# Patient Record
Sex: FEMALE | Race: BLACK OR AFRICAN AMERICAN | NOT HISPANIC OR LATINO | Employment: FULL TIME | URBAN - METROPOLITAN AREA
[De-identification: names, ages, dates, MRNs, and addresses within clinical notes are randomized per-mention and may not be internally consistent; named-entity substitution may affect disease eponyms.]

---

## 2017-01-23 ENCOUNTER — APPOINTMENT (OUTPATIENT)
Dept: LAB | Facility: HOSPITAL | Age: 47
End: 2017-01-23
Payer: COMMERCIAL

## 2017-01-23 ENCOUNTER — TRANSCRIBE ORDERS (OUTPATIENT)
Dept: ADMINISTRATIVE | Facility: HOSPITAL | Age: 47
End: 2017-01-23

## 2017-01-23 ENCOUNTER — HOSPITAL ENCOUNTER (OUTPATIENT)
Dept: NON INVASIVE DIAGNOSTICS | Facility: HOSPITAL | Age: 47
Discharge: HOME | End: 2017-01-23
Payer: COMMERCIAL

## 2017-01-23 DIAGNOSIS — Z98.890 H/O BILATERAL BREAST REDUCTION SURGERY: ICD-10-CM

## 2017-01-23 DIAGNOSIS — Z98.890 H/O BILATERAL BREAST REDUCTION SURGERY: Primary | ICD-10-CM

## 2017-01-23 LAB
ANION GAP SERPL CALCULATED.3IONS-SCNC: 8 MMOL/L (ref 4–13)
ATRIAL RATE: 84 BPM
BUN SERPL-MCNC: 12 MG/DL (ref 5–25)
CALCIUM SERPL-MCNC: 9.6 MG/DL (ref 8.3–10.1)
CHLORIDE SERPL-SCNC: 104 MMOL/L (ref 100–108)
CO2 SERPL-SCNC: 26 MMOL/L (ref 21–32)
CREAT SERPL-MCNC: 0.9 MG/DL (ref 0.6–1.3)
ERYTHROCYTE [DISTWIDTH] IN BLOOD BY AUTOMATED COUNT: 17.3 % (ref 11.6–15.1)
GFR SERPL CREATININE-BSD FRML MDRD: >60 ML/MIN/1.73SQ M
GLUCOSE SERPL-MCNC: 80 MG/DL (ref 65–140)
HCT VFR BLD AUTO: 30.2 % (ref 34.8–46.1)
HGB BLD-MCNC: 9.7 G/DL (ref 11.5–15.4)
MCH RBC QN AUTO: 24.3 PG (ref 26.8–34.3)
MCHC RBC AUTO-ENTMCNC: 32.1 G/DL (ref 31.4–37.4)
MCV RBC AUTO: 76 FL (ref 82–98)
P AXIS: 67 DEGREES
PLATELET # BLD AUTO: 379 THOUSANDS/UL (ref 149–390)
PMV BLD AUTO: 10.4 FL (ref 8.9–12.7)
POTASSIUM SERPL-SCNC: 4.2 MMOL/L (ref 3.5–5.3)
PR INTERVAL: 142 MS
QRS AXIS: 15 DEGREES
QRSD INTERVAL: 82 MS
QT INTERVAL: 360 MS
QTC INTERVAL: 425 MS
RBC # BLD AUTO: 4 MILLION/UL (ref 3.81–5.12)
SODIUM SERPL-SCNC: 138 MMOL/L (ref 136–145)
T WAVE AXIS: 35 DEGREES
VENTRICULAR RATE: 84 BPM
WBC # BLD AUTO: 6.23 THOUSAND/UL (ref 4.31–10.16)

## 2017-01-23 PROCEDURE — 85027 COMPLETE CBC AUTOMATED: CPT

## 2017-01-23 PROCEDURE — 36415 COLL VENOUS BLD VENIPUNCTURE: CPT

## 2017-01-23 PROCEDURE — 80048 BASIC METABOLIC PNL TOTAL CA: CPT

## 2017-01-23 PROCEDURE — 93005 ELECTROCARDIOGRAM TRACING: CPT

## 2017-04-18 ENCOUNTER — ALLSCRIPTS OFFICE VISIT (OUTPATIENT)
Dept: OTHER | Facility: OTHER | Age: 47
End: 2017-04-18

## 2017-05-22 ENCOUNTER — APPOINTMENT (EMERGENCY)
Dept: RADIOLOGY | Facility: HOSPITAL | Age: 47
End: 2017-05-22
Payer: COMMERCIAL

## 2017-05-22 ENCOUNTER — HOSPITAL ENCOUNTER (EMERGENCY)
Facility: HOSPITAL | Age: 47
Discharge: HOME | End: 2017-05-22
Payer: COMMERCIAL

## 2017-05-22 VITALS
SYSTOLIC BLOOD PRESSURE: 167 MMHG | RESPIRATION RATE: 18 BRPM | WEIGHT: 185 LBS | TEMPERATURE: 98.8 F | OXYGEN SATURATION: 100 % | HEART RATE: 102 BPM | DIASTOLIC BLOOD PRESSURE: 89 MMHG

## 2017-05-22 DIAGNOSIS — J02.0 STREP PHARYNGITIS: Primary | ICD-10-CM

## 2017-05-22 LAB — S PYO AG THROAT QL: POSITIVE

## 2017-05-22 PROCEDURE — 99283 EMERGENCY DEPT VISIT LOW MDM: CPT

## 2017-05-22 PROCEDURE — 71020 HB CHEST X-RAY 2VW FRONTAL&LATL: CPT

## 2017-05-22 PROCEDURE — 87430 STREP A AG IA: CPT | Performed by: EMERGENCY MEDICINE

## 2017-05-22 RX ORDER — AMOXICILLIN 500 MG/1
500 TABLET, FILM COATED ORAL 2 TIMES DAILY
Qty: 20 TABLET | Refills: 0 | Status: SHIPPED | OUTPATIENT
Start: 2017-05-22 | End: 2017-06-01

## 2017-05-25 ENCOUNTER — ALLSCRIPTS OFFICE VISIT (OUTPATIENT)
Dept: OTHER | Facility: OTHER | Age: 47
End: 2017-05-25

## 2017-05-25 DIAGNOSIS — E66.9 OBESITY: ICD-10-CM

## 2017-05-25 DIAGNOSIS — I10 ESSENTIAL (PRIMARY) HYPERTENSION: ICD-10-CM

## 2018-01-11 NOTE — MISCELLANEOUS
Provider Comments  Provider Comments:   Patient is a no-show for her [phone] appointment today for hypertension      Signatures   Electronically signed by : STEVE Anderson;  Apr 18 2017  6:51PM EST                       (Author)

## 2018-01-14 NOTE — RESULT NOTES
Verified Results  (1) BASIC METABOLIC PROFILE [id number] 09:11AM Lakeisha Valverde Order Number: [id number]  TW Order Number: [id number]     Test Name Result Flag Reference   GLUCOSE,RANDM 77 mg/dL     If the patient is fasting, the ADA then defines impaired fasting glucose as > 100 mg/dL and diabetes as > or equal to 123 mg/dL  SODIUM 139 mmol/L  136-145   POTASSIUM 4 4 mmol/L  3 5-5 3   CHLORIDE 106 mmol/L  100-108   CARBON DIOXIDE 27 mmol/L  21-32   ANION GAP (CALC) 6 mmol/L  4-13   BLOOD UREA NITROGEN 15 mg/dL  5-25   CREATININE 0 86 mg/dL  0 60-1 30   Standardized to IDMS reference method   CALCIUM 9 5 mg/dL  8 3-10 1   eGFR Non-African American      >60 0 ml/min/1 73sq m   [facility] [employer] Disease Education Program recommendations are as follows:  GFR calculation is accurate only with a steady state creatinine  Chronic Kidney disease less than 60 ml/min/1 73 sq  meters  Kidney failure less than 15 ml/min/1 73 sq  meters

## 2018-01-15 VITALS
HEIGHT: 61 IN | DIASTOLIC BLOOD PRESSURE: 92 MMHG | TEMPERATURE: 98.1 F | HEART RATE: 80 BPM | SYSTOLIC BLOOD PRESSURE: 138 MMHG | RESPIRATION RATE: 18 BRPM | WEIGHT: 184 LBS | BODY MASS INDEX: 34.74 KG/M2

## 2018-01-24 NOTE — PROGRESS NOTES
Assessment    1  Bilateral ankle pain (719 47) (M25 571,M25 572)   2  Pes planus of both feet (734) (M21 41,M21 42)   3  Deformity of foot, equinus (736 79) (M21 6X9)    Plan  Bilateral ankle pain, Bilateral foot pain, Deformity of foot, equinus, Pes planus of both feet    · *1 - [facility]  Consult PT-please evaluate and treat for  bilateral foot and ankle pain with pes planus and equinus deformity  Patient has chronic  swelling of both ankles  Limited range of motion especially dorsiflexion  Range of  motion, stretching, strengthening, modalities  2-3 times a week for 4-6 weeks  Please  teach home exercise and stretching program   Status: Active  Requested for: 20Bdt3854  Care Summary provided  : Yes   · *1 - [facility]  ConsultPT - custom orthotics evaluation for  b/l foot deformity  Status: Active  Requested for: 25Tjz3993  Care Summary provided  : Yes  Bilateral foot pain    · * XR ANKLE 3+ VIEW LEFT; Status:Active - Retrospective By Protocol Authorization; Requested for:11Ohu4350;    · * XR ANKLE 3+ VIEW RIGHT; Status:Active - Retrospective By Protocol Authorization; Requested for:40Dmo1793;   Deformity of foot, equinus    · Orthopedic Footwear Shoe Additions Custom Molded Shoe Insert; Status:Complete;    Done: 93CRX7966   · [facility] Physician Referral  Consult  Status: Active   Requested for: 48JAB7362  Care Summary provided  : Yes  Deformity of foot, equinus, Pes planus of both feet    · Ankle Brace; Status:Complete;   Done: 89SKN4523    Discussion/Summary    Chronic bilateral ankle pain with chronic swelling and instability  This completed the patient's pes planus and equinus deformity  Will start physical therapy as patient has limited range of motion especially tightness of the cast   She may use a lace up ankle brace for stability for now it  She may benefit from custom orthotics    I will refer her to podiatry for further evaluation and treatment  Followup with me on as needed basis  Continue Tylenol on as needed basis for pain  The treatment plan was reviewed with the patient/guardian  The patient/guardian understands and agrees with the treatment plan      Chief Complaint    1  Ankle Pain    History of Present Illness  HPI: Patient is a very pleasant 54 old female with chronic pes planus who presents for bilateral ankle pain and swelling which is worse with prolonged standing and walking  Patient works as a home health aide and does significant amount of walking, transferring and standing  Her pain is localized mostly over the anterolateral aspect of the right foot and medial aspect of the left foot and ankle  She has intermittent swelling which improves with rest and elevation of the feet  Patient takes Tylenol on as needed basis for pain  She describes an allergic reaction to NSAIDs with hives and questionable respiratory symptoms  Review of Systems    Constitutional: No fever, no chills, feels well, no tiredness, no recent weight gain or loss  Eyes: No complaints of eyesight problems, no red eyes  ENT: no loss of hearing, no nosebleeds, no sore throat  Cardiovascular: No complaints of chest pain, no palpitations, no leg claudication or lower extremity edema  Respiratory: no compliants of shortness of breath, no wheezing, no cough  Gastrointestinal: no complaints of abdominal pain, no constipation, no nausea or diarrhea, no vomiting, no bloody stools  Genitourinary: no complaints of dysuria, no incontinence  Musculoskeletal: as noted in HPI  Integumentary: no complaints of skin rash or lesion, no itching or dry skin, no skin wounds  Neurological: no complaints of headache, no confusion, no numbness or tingling, no dizziness     Endocrine: No complaints of muscle weakness, no feelings of weakness, no frequent urination, no excessive thirst    Psychiatric: No suicidal thoughts, no anxiety, no feelings of depression  ROS reviewed  Active Problems    1  Asthma (493 90) (J45 909)   2  Bilateral foot pain (729 5) (M79 671,M79 672)   3  Blood pressure elevated (401 9) (I10)   4  Encounter for routine gynecological examination (V72 31) (Z01 419)   5  Encounter for screening mammogram for breast cancer (V76 12) (Z12 31)   6  Hand pain, right (729 5) (M79 641)   7  Hypertension (401 9) (I10)   8  Left hip pain (719 45) (M25 552)   9  Leiomyoma of uterus (218 9) (D25 9)   10  Lumbar radiculopathy (724 4) (M54 16)   11  Obesity (278 00) (E66 9)   12  Palpitations (785 1) (R00 2)   13  Sickle cell trait (282 5) (D57 3)   14  Varicose Veins Of Lower Extremities (454 9)    Past Medical History    · History of Bacterial vaginosis (616 10,041 9) (N76 0,B96 89)   · History of  Delivery (669 70)   · History of Complete Elective  (635 92)   · History of backache (V13 59) (Z87 39)   · Denied: History of drug abuse   · Denied: History of mental disorder   · History of migraine (V12 49) (Z86 69)   · History of uterine leiomyoma (V13 29) (Z86 018)   · Normal delivery (650) (O80,Z37  9)    The active problems and past medical history were reviewed and updated today  Surgical History    · History of  Section   · History of Oral Surgery Tooth Extraction    The surgical history was reviewed and updated today  Family History  Father    · Family history of Diabetes Mellitus (V18 0)  Family History    · Family history of Diabetes Mellitus (V18 0)   · Family history of Hypertension (V17 49)   · Family history of Multiple Birth    The family history was reviewed and updated today  Social History    · Denied: History of Alcohol Use (History)   · Denied: History of Drug Use   · Never A Smoker   · Primary spoken language [place]  The social history was reviewed and updated today  Current Meds   1  Fish Oil CAPS; Therapy: (Recorded:69Mjx6135) to Recorded   2   Glucosamine CAPS;   Therapy: (Recorded:15Apr2013) to Recorded   3  Lisinopril 20 MG Oral Tablet; TAKE 1 TABLET DAILY; Therapy: 45NNO1903 to (Evaluate:19Nov2016)  Requested for: 47YCR1194; Last   Rx:19Sac7088 Ordered    The medication list was reviewed and updated today  Allergies    1  Fabienne-McFarlan TBEF   2  Aspirin CHEW   3  Ibuprofen TABS   4  Penicillins    Vitals   Recorded: 20ISV1069 97:88CV   Systolic 013   Diastolic 81   Heart Rate 74   Height 5 ft 1 in   Weight 181 lb    BMI Calculated 34 2   BSA Calculated 1 81     Physical Exam    Right Ankle: Appearance: Normal except Pes planus deformity with Equinus  Tenderness: None except the anterior lateral gutter, ATFL and sinus tarsi  Palpatory findings include no warmth  Dorsiflexion: painful restricted AROM  Inversion: painful restricted AROM  Motor: Normal  Special Tests: equivocal Talar Tilt, but negative Anterior Drawer Sign, equivocal double heel raise, equivocal single heel raise, no pain with passive ER and negative Squeeze Test    Left Ankle: Appearance: swelling and Pes planus and equinus deformity, but no ecchymosis and no erythema  Tenderness: Achilles tendon midsubstance, deltoid ligament, posteromedial region, posterolateral region and posterior tibialis tendon  ROM: painful Dorsiflexion: restricted AROM  Inversion: painful restricted AROM  Motor: Normal  Special Tests: abnormal calcaneal inversion on heel raise, equivocal single heel raise and equivocal double heel raise, but negative Anterior Drawer Sign, no pain with passive ER, no peroneal tendon subluxation, no posterior tibialis tendon subluxation, negative Talar Tilt and negative Tinel's at the Tarsal Tunnel  Constitutional - General appearance: Normal    Musculoskeletal - Gait and station: Abnormal  Gait evaluation demonstrated antalgia on the right   Lower extremity compartments: Normal    Cardiovascular - Pulses: Normal  Examination of extremities for edema and/or varicosities: Abnormal  nonpitting edema present and bilateral ankle 1-2+ pitting edema  Lungs: Normal respiratory rate and rhythm, no wheezes, no cough, no dyspnea  Skin - Skin and subcutaneous tissue: Normal    Neurologic - Sensation: Normal    Psychiatric - Mood and affect: Normal    Eyes   Conjunctiva and lids: Normal        Results/Data  I personally reviewed the films/images/results in the office today  My interpretation follows  X-ray Review X-ray of bilateral ankle shows no acute fracture  Mild dorsal degenerative changes  Likely enthesopathy at tibialis posterior insertion        Future Appointments    Date/Time Provider Specialty Site   08/22/2016 09:40 AM Bradley LUCAS, Podiatry  [facility]     Signatures   Electronically signed by : Dave Magallanes MD; Aug  4 2016  9:39PM EST                       (Author)

## 2020-03-05 ENCOUNTER — TELEPHONE (OUTPATIENT)
Dept: FAMILY MEDICINE CLINIC | Facility: CLINIC | Age: 50
End: 2020-03-05

## 2020-03-05 ENCOUNTER — OFFICE VISIT (OUTPATIENT)
Dept: FAMILY MEDICINE CLINIC | Facility: CLINIC | Age: 50
End: 2020-03-05

## 2020-03-05 VITALS
DIASTOLIC BLOOD PRESSURE: 90 MMHG | OXYGEN SATURATION: 99 % | TEMPERATURE: 96.5 F | BODY MASS INDEX: 37.17 KG/M2 | HEIGHT: 62 IN | RESPIRATION RATE: 16 BRPM | WEIGHT: 202 LBS | HEART RATE: 99 BPM | SYSTOLIC BLOOD PRESSURE: 142 MMHG

## 2020-03-05 DIAGNOSIS — I10 ESSENTIAL HYPERTENSION: Primary | ICD-10-CM

## 2020-03-05 DIAGNOSIS — M54.32 LEFT SIDED SCIATICA: ICD-10-CM

## 2020-03-05 DIAGNOSIS — Z12.39 BREAST CANCER SCREENING: ICD-10-CM

## 2020-03-05 DIAGNOSIS — Z13.1 DIABETES MELLITUS SCREENING: ICD-10-CM

## 2020-03-05 DIAGNOSIS — Z12.4 CERVICAL CANCER SCREENING: ICD-10-CM

## 2020-03-05 DIAGNOSIS — M72.2 PLANTAR FASCIITIS: ICD-10-CM

## 2020-03-05 DIAGNOSIS — M54.2 NECK PAIN: ICD-10-CM

## 2020-03-05 DIAGNOSIS — R35.0 URINARY FREQUENCY: ICD-10-CM

## 2020-03-05 LAB
SL AMB  POCT GLUCOSE, UA: NEGATIVE
SL AMB LEUKOCYTE ESTERASE,UA: NEGATIVE
SL AMB POCT BILIRUBIN,UA: NEGATIVE
SL AMB POCT BLOOD,UA: ABNORMAL
SL AMB POCT CLARITY,UA: CLEAR
SL AMB POCT COLOR,UA: CLEAR
SL AMB POCT KETONES,UA: NEGATIVE
SL AMB POCT NITRITE,UA: NEGATIVE
SL AMB POCT PH,UA: 5
SL AMB POCT SPECIFIC GRAVITY,UA: 1
SL AMB POCT URINE PROTEIN: ABNORMAL
SL AMB POCT UROBILINOGEN: 0.2

## 2020-03-05 PROCEDURE — 81002 URINALYSIS NONAUTO W/O SCOPE: CPT | Performed by: PHYSICIAN ASSISTANT

## 2020-03-05 PROCEDURE — 3080F DIAST BP >= 90 MM HG: CPT | Performed by: PHYSICIAN ASSISTANT

## 2020-03-05 PROCEDURE — 87086 URINE CULTURE/COLONY COUNT: CPT | Performed by: PHYSICIAN ASSISTANT

## 2020-03-05 PROCEDURE — 1036F TOBACCO NON-USER: CPT | Performed by: PHYSICIAN ASSISTANT

## 2020-03-05 PROCEDURE — 3077F SYST BP >= 140 MM HG: CPT | Performed by: PHYSICIAN ASSISTANT

## 2020-03-05 PROCEDURE — 3008F BODY MASS INDEX DOCD: CPT | Performed by: PHYSICIAN ASSISTANT

## 2020-03-05 PROCEDURE — 99214 OFFICE O/P EST MOD 30 MIN: CPT | Performed by: PHYSICIAN ASSISTANT

## 2020-03-05 RX ORDER — LISINOPRIL 20 MG/1
1 TABLET ORAL DAILY
COMMUNITY
Start: 2015-09-08 | End: 2020-03-05 | Stop reason: SDUPTHER

## 2020-03-05 RX ORDER — METHOCARBAMOL 500 MG/1
500 TABLET, FILM COATED ORAL
Qty: 90 TABLET | Refills: 0 | Status: SHIPPED | OUTPATIENT
Start: 2020-03-05 | End: 2021-04-05 | Stop reason: ALTCHOICE

## 2020-03-05 RX ORDER — LISINOPRIL 20 MG/1
20 TABLET ORAL DAILY
Qty: 90 TABLET | Refills: 1 | Status: SHIPPED | OUTPATIENT
Start: 2020-03-05 | End: 2021-04-05 | Stop reason: ALTCHOICE

## 2020-03-05 NOTE — PROGRESS NOTES
Assessment/Plan:    Essential hypertension  Blood pressure was slightly elevated today  Will send over refill of lisinopril 20 mg daily  Also recommend getting lab work completed  Left sided sciatica  Gave patient handout on appropriate stretches and exercises to help minimize sciatica symptoms  Would also recommend taking ibuprofen as needed when symptoms occur  If symptoms persist, can use Robaxin at night as needed to see if this will help minimize symptoms the next day  Ordered x-ray for further evaluation  May refer to physical therapy in the future, or try gabapentin to see if this helps minimize symptoms  Plantar fasciitis  Foot pain is likely due to plantar fasciitis  Informed patient that the best would result this is with stretches  Informed her that it may take several months for pain to fully resolve  In the meantime can try naproxen to help with the pain, and also ice massage  If needed can refer to Podiatry in the future if pain does not resolve  For urinary frequency, will order lab work for further evaluation  Did send urine out for culture to rule out any possible urinary tract infections  If needed may try an anticholinergic to see if this helps minimize symptoms  Diagnoses and all orders for this visit:    Essential hypertension  -     lisinopril (ZESTRIL) 20 mg tablet; Take 1 tablet (20 mg total) by mouth daily  -     CBC and differential; Future  -     Comprehensive metabolic panel; Future  -     Lipid panel; Future  -     Microalbumin / creatinine urine ratio; Future  -     TSH, 3rd generation with Free T4 reflex; Future    Plantar fasciitis    Left sided sciatica  -     XR spine lumbar minimum 4 views non injury; Future  -     methocarbamol (ROBAXIN) 500 mg tablet;  Take 1 tablet (500 mg total) by mouth daily at bedtime as needed (Sciatica)    Urinary frequency  -     POCT urine dip  -     Urine culture    Neck pain  -     XR spine cervical complete 4 or 5 vw non injury; Future    Cervical cancer screening  -     Ambulatory referral to Obstetrics / Gynecology; Future    Breast cancer screening  -     Mammo screening bilateral w cad; Future    Diabetes mellitus screening  -     Hemoglobin A1C; Future    Other orders  -     Discontinue: lisinopril (ZESTRIL) 20 mg tablet; Take 1 tablet by mouth daily          Subjective:      Patient ID: Drake Gonzalez is a 47 y o  female  50-year-old female presenting for follow-up of hypertension  Patient needs to be on medication for her hypertension, but states that she has not had insurance, so never made follow-up appointments regard refills of her medication  Does not recall the name of her blood pressure medication  Has not been checking her blood pressure  States that she eats somewhat healthy diet  No routine physical activity  Denies any headaches, dizziness, vision changes, chest pain, palpitations, shortness of breath, swelling pain in lower extremities  Patient has concerns with right heel pain x2 months  Describes it as a sharp pain  Pain is worse 1st thing in the morning, last for about 30 minutes  States pain will go away as long she is on her feet  If she sits for long period of time, pain will return  Has not noticed any erythema or swelling in the heel or ankle  Denies any injury to the foot  Has not been taking anything for the pain  Patient also has concerns with left-sided sciatica  States that this pain has been going on for years, but symptoms do come and go  Has not had any problems for the last 6 months, and states that she woke up this morning with a shooting pain down her left leg  States she has paresthesia in the leg  Feels like there is weakness at times  Has not had her legs give out on her  Denies any back pain  Has not been taking anything over-the-counter to help with pain  Patient is also concerned with increased urinary frequency  Has been going on for about 1 month    States that she does have some excessive thirst   Has not noticed any dysuria or hematuria, but states after her period she notices that she has been spotting longer  Has no abnormal discharge  Denies any pruritus in the area  States it feels like she empties her bladder fully  Denies any incontinence with sneezing, coughing, or laughing  The following portions of the patient's history were reviewed and updated as appropriate:   She  has a past medical history of Hypertension  She   Patient Active Problem List    Diagnosis Date Noted    Essential hypertension 2020    Plantar fasciitis 2020    Left sided sciatica 2020     She  has a past surgical history that includes Reduction mammaplasty and  section  Her family history is not on file  She  reports that she has never smoked  She has never used smokeless tobacco  She reports that she drank alcohol  She reports that she does not use drugs  Current Outpatient Medications   Medication Sig Dispense Refill    lisinopril (ZESTRIL) 20 mg tablet Take 1 tablet (20 mg total) by mouth daily 90 tablet 1    methocarbamol (ROBAXIN) 500 mg tablet Take 1 tablet (500 mg total) by mouth daily at bedtime as needed (Sciatica) 90 tablet 0     No current facility-administered medications for this visit  She is allergic to aspirin; calcium carbonate; ibuprofen; and penicillins       Review of Systems   Constitutional: Negative for activity change, appetite change, chills, diaphoresis, fatigue, fever and unexpected weight change  Eyes: Negative for pain and visual disturbance  Respiratory: Negative for cough, chest tightness and shortness of breath  Cardiovascular: Negative for chest pain, palpitations and leg swelling  Gastrointestinal: Negative for abdominal pain, blood in stool, constipation, diarrhea, nausea and vomiting  Endocrine: Positive for polydipsia  Negative for cold intolerance, heat intolerance, polyphagia and polyuria  Genitourinary: Positive for frequency  Negative for dysuria, hematuria and urgency  Musculoskeletal: Negative for joint swelling and myalgias  See HPI   Skin: Negative for rash and wound  Neurological: Positive for numbness  Negative for dizziness, syncope, speech difficulty, weakness and headaches  Psychiatric/Behavioral: Negative for dysphoric mood and sleep disturbance  The patient is not nervous/anxious  Objective:      /90 (BP Location: Left arm, Patient Position: Sitting, Cuff Size: Large)   Pulse 99   Temp (!) 96 5 °F (35 8 °C) (Temporal)   Resp 16   Ht 5' 1 5" (1 562 m)   Wt 91 6 kg (202 lb)   LMP  (LMP Unknown)   SpO2 99%   Breastfeeding No   BMI 37 55 kg/m²          Physical Exam   Constitutional: She is oriented to person, place, and time  She appears well-developed and well-nourished  No distress  HENT:   Right Ear: External ear normal    Left Ear: External ear normal    Nose: Nose normal    Mouth/Throat: Oropharynx is clear and moist    Eyes: Pupils are equal, round, and reactive to light  Conjunctivae and EOM are normal    Neck: Normal range of motion  Neck supple  No thyromegaly present  Cardiovascular: Normal rate, regular rhythm and normal heart sounds  Exam reveals no gallop and no friction rub  No murmur heard  Pulmonary/Chest: Effort normal and breath sounds normal  No respiratory distress  She has no wheezes  Abdominal: Soft  Bowel sounds are normal  She exhibits no distension  There is no tenderness  There is no rebound and no guarding  Musculoskeletal: Normal range of motion  She exhibits no edema  Cervical back: Normal         Lumbar back: Normal         Right foot: Normal         Left foot: Normal    Lymphadenopathy:     She has no cervical adenopathy  Neurological: She is alert and oriented to person, place, and time  She displays normal reflexes  No cranial nerve deficit  Coordination normal    Skin: She is not diaphoretic  Psychiatric: She has a normal mood and affect  Her behavior is normal    Nursing note and vitals reviewed

## 2020-03-06 NOTE — ASSESSMENT & PLAN NOTE
Gave patient handout on appropriate stretches and exercises to help minimize sciatica symptoms  Would also recommend taking ibuprofen as needed when symptoms occur  If symptoms persist, can use Robaxin at night as needed to see if this will help minimize symptoms the next day  Ordered x-ray for further evaluation  May refer to physical therapy in the future, or try gabapentin to see if this helps minimize symptoms

## 2020-03-06 NOTE — ASSESSMENT & PLAN NOTE
Foot pain is likely due to plantar fasciitis  Informed patient that the best would result this is with stretches  Informed her that it may take several months for pain to fully resolve  In the meantime can try naproxen to help with the pain, and also ice massage  If needed can refer to Podiatry in the future if pain does not resolve

## 2020-03-06 NOTE — ASSESSMENT & PLAN NOTE
Blood pressure was slightly elevated today  Will send over refill of lisinopril 20 mg daily  Also recommend getting lab work completed

## 2020-03-07 LAB — BACTERIA UR CULT: NORMAL

## 2021-03-24 NOTE — PROGRESS NOTES
Assessment/Plan:    AUB/enlarged uterus - will schedule TVS/SIS/EMB  Script given for TSH/CBC  OAB - discussed decreasing caffeine intake  Patient also made aware that enlarged uterus may also be contributing to urinary sx  Will consider Vesicare 10 mg in the future  Referred to Julia Vital PA-C for primary care  Recommended monthly SBE, annual CBE and the importance of annual screening mammo reviewed  ASCCP guidelines reviewed and pap with cotesting done today  Colonoscopy referral completed  The patient denies STI risk factors and declines testing at this time  Reviewed diet/activity recommendations Calcium 2613-0109 mg and Vit D 600-1000 IU daily  Discussed sara/postmenopausal considerations and symptoms to report  Diagnoses and all orders for this visit:    Encounter for gynecological examination (general) (routine) with abnormal findings    Screening mammogram, encounter for  -     Mammo screening bilateral w 3d & cad; Future    Encounter for screening colonoscopy  -     Ambulatory referral to Gastroenterology; Future    Menorrhagia with regular cycle  -     CBC and differential  -     TSH, 3rd generation with Free T4 reflex    Abnormal uterine bleeding (AUB)    OAB (overactive bladder)    Nocturia    Enlarged uterus        Subjective:      Patient ID: Esther Gonzalez is a 45 y o  female  This new patient presents for routine annual gyn exam   She reports normal regular menses up until most recent menses although she also reports a hx of anemia  Reports menses from 2/1/21-3/17/21  She states she has been told she has a fibroid uterus, believes last ultrasound may have been about 3 years ago  No records available for review today  Last H/H on record was 2017, 9 7/30 2  She reports she was taking iron at one time but stopped secondary to dizziness  She reports history of urinary frequency, urgency, without UUI  Most bothersome at night when she gets up 5-6 times at night   She drinks coffee and tea all day and up until bed time  She denies VM sx, pelvic pain, dyspareunia, breast concerns, abnormal discharge, bowel dysfunction, depression/anx  , sexually active and is monogamous  Denies STI concerns  No hx of STIs  She has not used [facility] with current partner for 4 years without pregnancy  Last pap 2017, denies hx abnormal paps  Last mammo 2016, denies abnormal mammo or family hx of breast, ovarian or colon cancer  Colonoscopy not done to date  The following portions of the patient's history were reviewed and updated as appropriate: allergies, current medications, past family history, past medical history, past social history, past surgical history and problem list     Review of Systems   Constitutional: Negative  HENT: Negative  Respiratory: Negative  Cardiovascular: Negative  Gastrointestinal: Negative  Endocrine: Negative  Genitourinary: Negative for difficulty urinating, dyspareunia, dysuria, frequency, menstrual problem, pelvic pain, urgency, vaginal bleeding, vaginal discharge and vaginal pain  Musculoskeletal: Negative  Skin: Negative  Neurological: Negative  Psychiatric/Behavioral: Negative  Objective:      /78 (BP Location: Left arm, Patient Position: Sitting, Cuff Size: Standard)   Pulse (!) 117   Ht 5' 2 2" (1 58 m)   Wt 93 4 kg (206 lb)   LMP 02/01/2021   BMI 37 44 kg/m²          Physical Exam  Vitals signs and nursing note reviewed  Exam conducted with a chaperone present  Constitutional:       Appearance: Normal appearance  She is well-developed  HENT:      Head: Normocephalic and atraumatic  Neck:      Musculoskeletal: Normal range of motion and neck supple  Thyroid: No thyroid mass or thyromegaly  Cardiovascular:      Rate and Rhythm: Normal rate and regular rhythm  Heart sounds: Normal heart sounds  Pulmonary:      Effort: Pulmonary effort is normal       Breath sounds: Normal breath sounds     Chest: Breasts: Breasts are symmetrical          Right: No inverted nipple, mass, nipple discharge, skin change or tenderness  Left: No inverted nipple, mass, nipple discharge, skin change or tenderness  Abdominal:      General: Bowel sounds are normal       Palpations: Abdomen is soft  Tenderness: There is no abdominal tenderness  Hernia: There is no hernia in the left inguinal area or right inguinal area  Genitourinary:     General: Normal vulva  Exam position: Supine  Pubic Area: No rash  Labia:         Right: Lesion (pea-sized lipoma noted at 8 oclock) present  No rash, tenderness or injury  Left: No rash, tenderness, lesion or injury  Urethra: No prolapse, urethral pain, urethral swelling or urethral lesion  Vagina: Normal  No signs of injury and foreign body  No vaginal discharge, erythema, tenderness, bleeding, lesions or prolapsed vaginal walls  Cervix: No cervical motion tenderness, discharge, friability, lesion, erythema, cervical bleeding or eversion  Uterus: Enlarged (16-18 week fibroid uterus noted)  Not deviated, not fixed, not tender and no uterine prolapse  Adnexa:         Right: No mass, tenderness or fullness  Left: No mass, tenderness or fullness  Rectum: No external hemorrhoid  Comments: Urethra normal without lesions  No bladder tenderness  Musculoskeletal: Normal range of motion  Lymphadenopathy:      Lower Body: No right inguinal adenopathy  No left inguinal adenopathy  Skin:     General: Skin is warm and dry  Neurological:      Mental Status: She is alert and oriented to person, place, and time  Psychiatric:         Speech: Speech normal          Behavior: Behavior normal  Behavior is cooperative

## 2021-03-29 ENCOUNTER — TELEPHONE (OUTPATIENT)
Dept: GASTROENTEROLOGY | Facility: AMBULARY SURGERY CENTER | Age: 51
End: 2021-03-29

## 2021-03-29 ENCOUNTER — OFFICE VISIT (OUTPATIENT)
Dept: GYNECOLOGY | Facility: CLINIC | Age: 51
End: 2021-03-29

## 2021-03-29 ENCOUNTER — LAB (OUTPATIENT)
Dept: LAB | Facility: HOSPITAL | Age: 51
End: 2021-03-29
Payer: COMMERCIAL

## 2021-03-29 VITALS
SYSTOLIC BLOOD PRESSURE: 120 MMHG | WEIGHT: 206 LBS | BODY MASS INDEX: 37.91 KG/M2 | HEIGHT: 62 IN | DIASTOLIC BLOOD PRESSURE: 78 MMHG | HEART RATE: 117 BPM

## 2021-03-29 DIAGNOSIS — Z12.11 ENCOUNTER FOR SCREENING COLONOSCOPY: ICD-10-CM

## 2021-03-29 DIAGNOSIS — R35.1 NOCTURIA: ICD-10-CM

## 2021-03-29 DIAGNOSIS — N32.81 OAB (OVERACTIVE BLADDER): ICD-10-CM

## 2021-03-29 DIAGNOSIS — N85.2 ENLARGED UTERUS: ICD-10-CM

## 2021-03-29 DIAGNOSIS — Z12.31 SCREENING MAMMOGRAM, ENCOUNTER FOR: ICD-10-CM

## 2021-03-29 DIAGNOSIS — N93.9 ABNORMAL UTERINE BLEEDING (AUB): ICD-10-CM

## 2021-03-29 DIAGNOSIS — D50.0 IRON DEFICIENCY ANEMIA DUE TO CHRONIC BLOOD LOSS: Primary | ICD-10-CM

## 2021-03-29 DIAGNOSIS — N92.0 MENORRHAGIA WITH REGULAR CYCLE: ICD-10-CM

## 2021-03-29 DIAGNOSIS — Z01.419 ENCOUNTER FOR GYNECOLOGICAL EXAMINATION WITH PAPANICOLAOU SMEAR OF CERVIX: ICD-10-CM

## 2021-03-29 DIAGNOSIS — Z01.411 ENCOUNTER FOR GYNECOLOGICAL EXAMINATION (GENERAL) (ROUTINE) WITH ABNORMAL FINDINGS: Primary | ICD-10-CM

## 2021-03-29 LAB
BASOPHILS # BLD AUTO: 0.05 THOUSANDS/ΜL (ref 0–0.1)
BASOPHILS NFR BLD AUTO: 1 % (ref 0–1)
EOSINOPHIL # BLD AUTO: 0.11 THOUSAND/ΜL (ref 0–0.61)
EOSINOPHIL NFR BLD AUTO: 1 % (ref 0–6)
ERYTHROCYTE [DISTWIDTH] IN BLOOD BY AUTOMATED COUNT: 19.9 % (ref 11.6–15.1)
HCT VFR BLD AUTO: 26.4 % (ref 34.8–46.1)
HGB BLD-MCNC: 7.4 G/DL (ref 11.5–15.4)
IMM GRANULOCYTES # BLD AUTO: 0.04 THOUSAND/UL (ref 0–0.2)
IMM GRANULOCYTES NFR BLD AUTO: 1 % (ref 0–2)
LYMPHOCYTES # BLD AUTO: 2.52 THOUSANDS/ΜL (ref 0.6–4.47)
LYMPHOCYTES NFR BLD AUTO: 30 % (ref 14–44)
MCH RBC QN AUTO: 19 PG (ref 26.8–34.3)
MCHC RBC AUTO-ENTMCNC: 28 G/DL (ref 31.4–37.4)
MCV RBC AUTO: 68 FL (ref 82–98)
MONOCYTES # BLD AUTO: 0.84 THOUSAND/ΜL (ref 0.17–1.22)
MONOCYTES NFR BLD AUTO: 10 % (ref 4–12)
NEUTROPHILS # BLD AUTO: 4.81 THOUSANDS/ΜL (ref 1.85–7.62)
NEUTS SEG NFR BLD AUTO: 57 % (ref 43–75)
NRBC BLD AUTO-RTO: 1 /100 WBCS
PLATELET # BLD AUTO: 427 THOUSANDS/UL (ref 149–390)
PMV BLD AUTO: 8.8 FL (ref 8.9–12.7)
RBC # BLD AUTO: 3.9 MILLION/UL (ref 3.81–5.12)
TSH SERPL DL<=0.05 MIU/L-ACNC: 1.06 UIU/ML (ref 0.36–3.74)
WBC # BLD AUTO: 8.37 THOUSAND/UL (ref 4.31–10.16)

## 2021-03-29 PROCEDURE — 36415 COLL VENOUS BLD VENIPUNCTURE: CPT | Performed by: OBSTETRICS & GYNECOLOGY

## 2021-03-29 PROCEDURE — 84443 ASSAY THYROID STIM HORMONE: CPT | Performed by: OBSTETRICS & GYNECOLOGY

## 2021-03-29 PROCEDURE — G0143 SCR C/V CYTO,THINLAYER,RESCR: HCPCS | Performed by: OBSTETRICS & GYNECOLOGY

## 2021-03-29 PROCEDURE — 85025 COMPLETE CBC W/AUTO DIFF WBC: CPT | Performed by: OBSTETRICS & GYNECOLOGY

## 2021-03-29 PROCEDURE — 99386 PREV VISIT NEW AGE 40-64: CPT | Performed by: OBSTETRICS & GYNECOLOGY

## 2021-03-29 PROCEDURE — 87624 HPV HI-RISK TYP POOLED RSLT: CPT | Performed by: OBSTETRICS & GYNECOLOGY

## 2021-03-29 RX ORDER — SODIUM CHLORIDE 9 MG/ML
20 INJECTION, SOLUTION INTRAVENOUS ONCE
Status: CANCELLED | OUTPATIENT
Start: 2021-04-01

## 2021-03-29 NOTE — PROGRESS NOTES
Patient made aware of low hemoglobin and need for iron infusion  Orders placed for Venofer twice weekly  Will recheck CBC in 2 weeks   ED parameters reviewed

## 2021-03-29 NOTE — TELEPHONE ENCOUNTER
03/29/21  Screened by: Jaguar Harp MA    Referring Provider dr Krista Sousa     Pre- Screening: PT PASSED OA; Please contact pt [phone]    There is no height or weight on file to calculate BMI  Has patient been referred for a routine screening Colonoscopy? yes  Is the patient between 39-70 years old? yes      Previous Colonoscopy no   If yes:    Date: nn/a     Facility: n/a    Reason: n/a      SCHEDULING STAFF: If the patient is between 45yrs-49yrs, please advise patient to confirm benefits/coverage with their insurance company for a routine screening colonoscopy, some insurance carriers will only cover at [facility] or [facility]  If the patient is over 66years old, please schedule an office visit  Does the patient want to see a Gastroenterologist prior to their procedure OR are they having any GI symptoms? no    Has the patient been hospitalized or had abdominal surgery in the past 6 months? no    Does the patient use supplemental oxygen? no    Does the patient take Coumadin, Lovenox, Plavix, Elliquis, Xarelto, or other blood thinning medication? no    Has the patient had a stroke, cardiac event, or stent placed in the past year? no    SCHEDULING STAFF: If patient answers NO to above questions, then schedule procedure  If patient answers YES to above questions, then schedule office appointment  If patient is between 45yrs - 49yrs, please advise patient that we will have to confirm benefits & coverage with their insurance company for a routine screening colonoscopy

## 2021-03-31 LAB
HPV HR 12 DNA CVX QL NAA+PROBE: NEGATIVE
HPV16 DNA CVX QL NAA+PROBE: NEGATIVE
HPV18 DNA CVX QL NAA+PROBE: NEGATIVE

## 2021-04-01 ENCOUNTER — PATIENT OUTREACH (OUTPATIENT)
Dept: CASE MANAGEMENT | Facility: HOSPITAL | Age: 51
End: 2021-04-01

## 2021-04-01 ENCOUNTER — HOSPITAL ENCOUNTER (OUTPATIENT)
Dept: INFUSION CENTER | Facility: CLINIC | Age: 51
Discharge: HOME | End: 2021-04-01

## 2021-04-01 VITALS
HEART RATE: 106 BPM | OXYGEN SATURATION: 100 % | TEMPERATURE: 98.6 F | SYSTOLIC BLOOD PRESSURE: 160 MMHG | DIASTOLIC BLOOD PRESSURE: 90 MMHG | RESPIRATION RATE: 18 BRPM

## 2021-04-01 DIAGNOSIS — D50.0 IRON DEFICIENCY ANEMIA DUE TO CHRONIC BLOOD LOSS: Primary | ICD-10-CM

## 2021-04-01 PROCEDURE — 96365 THER/PROPH/DIAG IV INF INIT: CPT

## 2021-04-01 RX ORDER — SODIUM CHLORIDE 9 MG/ML
20 INJECTION, SOLUTION INTRAVENOUS ONCE
Status: CANCELLED | OUTPATIENT
Start: 2021-04-03

## 2021-04-01 RX ORDER — SODIUM CHLORIDE 9 MG/ML
20 INJECTION, SOLUTION INTRAVENOUS ONCE
Status: COMPLETED | OUTPATIENT
Start: 2021-04-01 | End: 2021-04-01

## 2021-04-01 RX ADMIN — SODIUM CHLORIDE 20 ML/HR: 0.9 INJECTION, SOLUTION INTRAVENOUS at 14:26

## 2021-04-01 RX ADMIN — SODIUM CHLORIDE 200 MG: 9 INJECTION, SOLUTION INTRAVENOUS at 14:31

## 2021-04-01 NOTE — PLAN OF CARE
Problem: Potential for Falls  Goal: Patient will remain free of falls  Description: INTERVENTIONS:  - Assess patient frequently for physical needs  -  Identify cognitive and physical deficits and behaviors that affect risk of falls    -  [place] fall precautions as indicated by assessment   - Educate patient/family on patient safety including physical limitations  - Instruct patient to call for assistance with activity based on assessment  - Modify environment to reduce risk of injury  - Consider OT/PT consult to assist with strengthening/mobility  Outcome: Progressing

## 2021-04-01 NOTE — PROGRESS NOTES
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  She reported she started a Medical Assistance application online but hasn't finished it yet  Encouraged her to complete  Provided phone number to [facility]   Pt does not have a cancer diagnosis, but is scheduled to receive blood products and Venofer for anemia  She was referred to [address] [place] [address] for a PCP visit on 5/5/21  LSW explained will refer her to their CM team to continue follow up with her as an outpatient  LSW emailed Suzie Beatty, financial counselor with [facility] and explained pt's lack of health insurance

## 2021-04-02 ENCOUNTER — PATIENT OUTREACH (OUTPATIENT)
Dept: CASE MANAGEMENT | Facility: HOSPITAL | Age: 51
End: 2021-04-02

## 2021-04-02 ENCOUNTER — PREP FOR PROCEDURE (OUTPATIENT)
Dept: GASTROENTEROLOGY | Facility: CLINIC | Age: 51
End: 2021-04-02

## 2021-04-02 DIAGNOSIS — Z12.11 COLON CANCER SCREENING: Primary | ICD-10-CM

## 2021-04-02 LAB
LAB AP GYN PRIMARY INTERPRETATION: NORMAL
Lab: NORMAL

## 2021-04-02 NOTE — PROGRESS NOTES
CHEN received email response from Leta Ramon  Financial counselor  Pt will be referred to [facility] for continued assistance with MA application

## 2021-04-05 ENCOUNTER — APPOINTMENT (OUTPATIENT)
Dept: LAB | Facility: MEDICAL CENTER | Age: 51
End: 2021-04-05
Payer: COMMERCIAL

## 2021-04-05 ENCOUNTER — OFFICE VISIT (OUTPATIENT)
Dept: FAMILY MEDICINE CLINIC | Facility: CLINIC | Age: 51
End: 2021-04-05

## 2021-04-05 VITALS
OXYGEN SATURATION: 99 % | RESPIRATION RATE: 14 BRPM | WEIGHT: 206.6 LBS | SYSTOLIC BLOOD PRESSURE: 158 MMHG | HEART RATE: 113 BPM | HEIGHT: 62 IN | DIASTOLIC BLOOD PRESSURE: 98 MMHG | BODY MASS INDEX: 38.02 KG/M2

## 2021-04-05 DIAGNOSIS — D50.0 IRON DEFICIENCY ANEMIA DUE TO CHRONIC BLOOD LOSS: ICD-10-CM

## 2021-04-05 DIAGNOSIS — I10 ESSENTIAL HYPERTENSION: Primary | ICD-10-CM

## 2021-04-05 DIAGNOSIS — D64.9 ANEMIA, UNSPECIFIED TYPE: ICD-10-CM

## 2021-04-05 LAB
BASOPHILS # BLD AUTO: 0.05 THOUSANDS/ΜL (ref 0–0.1)
BASOPHILS NFR BLD AUTO: 1 % (ref 0–1)
EOSINOPHIL # BLD AUTO: 0.21 THOUSAND/ΜL (ref 0–0.61)
EOSINOPHIL NFR BLD AUTO: 2 % (ref 0–6)
ERYTHROCYTE [DISTWIDTH] IN BLOOD BY AUTOMATED COUNT: 22.3 % (ref 11.6–15.1)
HCT VFR BLD AUTO: 26.5 % (ref 34.8–46.1)
HGB BLD-MCNC: 7.4 G/DL (ref 11.5–15.4)
IMM GRANULOCYTES # BLD AUTO: 0.03 THOUSAND/UL (ref 0–0.2)
IMM GRANULOCYTES NFR BLD AUTO: 0 % (ref 0–2)
LYMPHOCYTES # BLD AUTO: 3.42 THOUSANDS/ΜL (ref 0.6–4.47)
LYMPHOCYTES NFR BLD AUTO: 33 % (ref 14–44)
MCH RBC QN AUTO: 19.5 PG (ref 26.8–34.3)
MCHC RBC AUTO-ENTMCNC: 27.9 G/DL (ref 31.4–37.4)
MCV RBC AUTO: 70 FL (ref 82–98)
MONOCYTES # BLD AUTO: 0.98 THOUSAND/ΜL (ref 0.17–1.22)
MONOCYTES NFR BLD AUTO: 9 % (ref 4–12)
NEUTROPHILS # BLD AUTO: 5.82 THOUSANDS/ΜL (ref 1.85–7.62)
NEUTS SEG NFR BLD AUTO: 55 % (ref 43–75)
NRBC BLD AUTO-RTO: 1 /100 WBCS
PLATELET # BLD AUTO: 464 THOUSANDS/UL (ref 149–390)
PMV BLD AUTO: 9.5 FL (ref 8.9–12.7)
RBC # BLD AUTO: 3.79 MILLION/UL (ref 3.81–5.12)
WBC # BLD AUTO: 10.51 THOUSAND/UL (ref 4.31–10.16)

## 2021-04-05 PROCEDURE — 36415 COLL VENOUS BLD VENIPUNCTURE: CPT

## 2021-04-05 PROCEDURE — 99213 OFFICE O/P EST LOW 20 MIN: CPT | Performed by: INTERNAL MEDICINE

## 2021-04-05 PROCEDURE — 85025 COMPLETE CBC W/AUTO DIFF WBC: CPT

## 2021-04-05 RX ORDER — LISINOPRIL 20 MG/1
20 TABLET ORAL DAILY
Qty: 30 TABLET | Refills: 0 | Status: SHIPPED | OUTPATIENT
Start: 2021-04-05 | End: 2021-04-27

## 2021-04-05 RX ORDER — ADHESIVE BANDAGE 3/4"
BANDAGE TOPICAL 2 TIMES DAILY
Qty: 1 EACH | Refills: 0 | Status: SHIPPED | OUTPATIENT
Start: 2021-04-05

## 2021-04-05 NOTE — PROGRESS NOTES
Assessment/Plan:    No problem-specific Assessment & Plan notes found for this encounter  Diagnoses and all orders for this visit:    Essential hypertension  Comments:  Blood pressure is 160/100 today  Will restart lisinopril 20 mg daily  Patient will check her blood pressures for 7 days and she will follow-up with us  Orders:  -     lisinopril (ZESTRIL) 20 mg tablet; Take 1 tablet (20 mg total) by mouth daily  -     Blood Pressure Monitoring (Blood Pressure Cuff) MISC; Use 2 (two) times a day Omron, Upper arm, large cuff    Anemia, unspecified type  Comments:  Likely due to chronic blood loss due to menorrhagia  Last hemoglobin of 7 4  Will recheck CBC  She follows up with OBGYN  Orders:  -     CBC and differential; Future          Subjective:      Patient ID: Esther Gonzalez is a 45 y o  female  Patient came today with complaints of shortness of breath, she was recently diagnosed with severe anemia with hemoglobin of 7 4  Also she was noticed to have high blood pressures during her Venofer infusion  She follows up with OBGYN right now because of the fibroids  She has scheduled ultrasound in few days  She reports that he has long history of menorrhagia but currently she does not have excessive bleeding  She also said that she has sickle cell disease trait  She denies any lightheadedness, substernal chest pain no palpitations  Her blood pressure today recheck by me 160/100  She used to be on lisinopril in the past but she is not taking it right now  The following portions of the patient's history were reviewed and updated as appropriate: allergies, current medications, past family history, past medical history, past social history, past surgical history, and problem list     Review of Systems   Constitutional: Negative for activity change, appetite change, chills, fatigue and fever  HENT: Negative for congestion, ear pain, rhinorrhea and sore throat      Respiratory: Positive for shortness of breath  Negative for cough and wheezing  Cardiovascular: Negative for chest pain, palpitations and leg swelling  Gastrointestinal: Negative for abdominal distention, abdominal pain, diarrhea, nausea and vomiting  Genitourinary: Negative for difficulty urinating, frequency and pelvic pain  Musculoskeletal: Negative for arthralgias, back pain and neck pain  Skin: Negative for rash  Neurological: Negative for dizziness, tremors, weakness, numbness and headaches  Objective:      /98 (BP Location: Right arm, Patient Position: Sitting, Cuff Size: Standard)   Pulse (!) 113   Resp 14   Ht 5' 2 2" (1 58 m)   Wt 93 7 kg (206 lb 9 6 oz)   SpO2 99%   BMI 37 55 kg/m²          Physical Exam  Vitals signs reviewed  Constitutional:       General: She is not in acute distress  Appearance: She is well-developed  She is not diaphoretic  HENT:      Head: Normocephalic and atraumatic  Eyes:      General:         Right eye: No discharge  Left eye: No discharge  Neck:      Musculoskeletal: Normal range of motion and neck supple  Cardiovascular:      Rate and Rhythm: Regular rhythm  Tachycardia present  Pulses: Normal pulses  Pulmonary:      Effort: Pulmonary effort is normal  No respiratory distress  Breath sounds: Normal breath sounds  No wheezing  Abdominal:      Palpations: Abdomen is soft  Musculoskeletal: Normal range of motion  Lymphadenopathy:      Cervical: No cervical adenopathy  Skin:     General: Skin is warm and dry  Neurological:      Mental Status: She is alert and oriented to person, place, and time  Cranial Nerves: No cranial nerve deficit  Psychiatric:         Speech: Speech normal          Behavior: Behavior normal          Thought Content:  Thought content normal          Judgment: Judgment normal                Current Outpatient Medications:     Blood Pressure Monitoring (Blood Pressure Cuff) MISC, Use 2 (two) times a day Omron, Upper arm, large cuff, Disp: 1 each, Rfl: 0    lisinopril (ZESTRIL) 20 mg tablet, Take 1 tablet (20 mg total) by mouth daily, Disp: 30 tablet, Rfl: 0

## 2021-04-06 ENCOUNTER — HOSPITAL ENCOUNTER (OUTPATIENT)
Dept: INFUSION CENTER | Facility: CLINIC | Age: 51
Discharge: HOME | End: 2021-04-06

## 2021-04-06 VITALS
TEMPERATURE: 98.6 F | DIASTOLIC BLOOD PRESSURE: 86 MMHG | RESPIRATION RATE: 18 BRPM | HEART RATE: 109 BPM | SYSTOLIC BLOOD PRESSURE: 144 MMHG

## 2021-04-06 DIAGNOSIS — D50.0 IRON DEFICIENCY ANEMIA DUE TO CHRONIC BLOOD LOSS: Primary | ICD-10-CM

## 2021-04-06 DIAGNOSIS — D64.9 ANEMIA, UNSPECIFIED TYPE: Primary | ICD-10-CM

## 2021-04-06 PROCEDURE — 96365 THER/PROPH/DIAG IV INF INIT: CPT

## 2021-04-06 RX ORDER — SODIUM CHLORIDE 9 MG/ML
20 INJECTION, SOLUTION INTRAVENOUS ONCE
Status: CANCELLED | OUTPATIENT
Start: 2021-04-09

## 2021-04-06 RX ORDER — SODIUM CHLORIDE 9 MG/ML
20 INJECTION, SOLUTION INTRAVENOUS ONCE
Status: COMPLETED | OUTPATIENT
Start: 2021-04-06 | End: 2021-04-06

## 2021-04-06 RX ADMIN — SODIUM CHLORIDE 20 ML/HR: 0.9 INJECTION, SOLUTION INTRAVENOUS at 08:32

## 2021-04-06 RX ADMIN — SODIUM CHLORIDE 200 MG: 9 INJECTION, SOLUTION INTRAVENOUS at 08:45

## 2021-04-06 NOTE — PLAN OF CARE
Problem: Potential for Falls  Goal: Patient will remain free of falls  Description: INTERVENTIONS:  - Assess patient frequently for physical needs  -  Identify cognitive and physical deficits and behaviors that affect risk of falls    -  [place] fall precautions as indicated by assessment   - Educate patient/family on patient safety including physical limitations  - Instruct patient to call for assistance with activity based on assessment  - Modify environment to reduce risk of injury  - Consider OT/PT consult to assist with strengthening/mobility  Outcome: Progressing

## 2021-04-07 ENCOUNTER — OFFICE VISIT (OUTPATIENT)
Dept: GYNECOLOGY | Facility: CLINIC | Age: 51
End: 2021-04-07

## 2021-04-07 ENCOUNTER — DOCUMENTATION (OUTPATIENT)
Dept: GYNECOLOGY | Facility: CLINIC | Age: 51
End: 2021-04-07

## 2021-04-07 ENCOUNTER — ULTRASOUND (OUTPATIENT)
Dept: GYNECOLOGY | Facility: CLINIC | Age: 51
End: 2021-04-07

## 2021-04-07 DIAGNOSIS — D50.0 IRON DEFICIENCY ANEMIA DUE TO CHRONIC BLOOD LOSS: ICD-10-CM

## 2021-04-07 DIAGNOSIS — N92.1 MENORRHAGIA WITH IRREGULAR CYCLE: Primary | ICD-10-CM

## 2021-04-07 DIAGNOSIS — D21.9 FIBROIDS: ICD-10-CM

## 2021-04-07 DIAGNOSIS — N93.9 ABNORMAL UTERINE BLEEDING (AUB): ICD-10-CM

## 2021-04-07 PROCEDURE — 76831 ECHO EXAM UTERUS: CPT | Performed by: OBSTETRICS & GYNECOLOGY

## 2021-04-07 PROCEDURE — 76830 TRANSVAGINAL US NON-OB: CPT | Performed by: OBSTETRICS & GYNECOLOGY

## 2021-04-07 PROCEDURE — 58340 CATHETER FOR HYSTEROGRAPHY: CPT | Performed by: OBSTETRICS & GYNECOLOGY

## 2021-04-07 PROCEDURE — 58100 BIOPSY OF UTERUS LINING: CPT | Performed by: OBSTETRICS & GYNECOLOGY

## 2021-04-07 PROCEDURE — 99214 OFFICE O/P EST MOD 30 MIN: CPT | Performed by: OBSTETRICS & GYNECOLOGY

## 2021-04-07 PROCEDURE — 88305 TISSUE EXAM BY PATHOLOGIST: CPT | Performed by: PATHOLOGY

## 2021-04-07 PROCEDURE — 76856 US EXAM PELVIC COMPLETE: CPT | Performed by: OBSTETRICS & GYNECOLOGY

## 2021-04-07 NOTE — PROGRESS NOTES
Assessment/Plan:    Reviewed ultrasound findings and I discussed options  Patient is not having any bulk symptoms related to fibroids her main issue is that of menorrhagia  Discussed options including following versus medical management versus surgical management  Discussed supracervical versus total hysterectomy both with bilateral salpingectomy  Discussed approaching this laparoscopically  At this point the patient has opted to give a trial of Lupron  She will have monthly Lupron for 6 months  Diagnoses and all orders for this visit:    Menorrhagia with irregular cycle    Fibroids        Subjective:      Patient ID: Esther Gonzalez is a 45 y o  female  HPI  patient presented 3/29 for routine annual gyn exam   She reports normal regular menses up until most recent menses although she also reports a hx of anemia  Reports menses from 2/1/21-3/17/21  She states she has been told she has a fibroid uterus, believes last ultrasound may have been about 3 years ago  No records available for review today  Last H/H on record was 2017, 9 7/30 2  She reports she was taking iron at one time but stopped secondary to dizziness  She reports history of urinary frequency, urgency, without UUI  Most bothersome at night when she gets up 5-6 times at night  She drinks coffee and tea all day and up until bed time  She denies VM sx, pelvic pain, dyspareunia, breast concerns, abnormal discharge, bowel dysfunction, depression/anx  , sexually active and is monogamous  Denies STI concerns  No hx of STIs  She has not used [facility] with current partner for 4 years without pregnancy  Last pap 2017, denies hx abnormal paps  Last mammo 2016, denies abnormal mammo or family hx of breast, ovarian or colon cancer    Hemoglobin was 7 4  She had her 1st iron infusion yesterday is due again tomorrow and then twice next week  Hemoglobin prior to this recent 1 was in January 2017 and was 9 7      The following portions of the patient's history were reviewed and updated as appropriate:   She  has a past medical history of Hypertension  She   Patient Active Problem List    Diagnosis Date Noted    Anemia 2021    Iron deficiency anemia due to chronic blood loss 2021    Essential hypertension 2020    Plantar fasciitis 2020    Left sided sciatica 2020     She  has a past surgical history that includes Reduction mammaplasty and  section  Her family history includes Diabetes in her father; Hypertension in her mother  She  reports that she has never smoked  She has never used smokeless tobacco  She reports previous alcohol use  She reports that she does not use drugs  Current Outpatient Medications   Medication Sig Dispense Refill    Blood Pressure Monitoring (Blood Pressure Cuff) MISC Use 2 (two) times a day Omron, Upper arm, large cuff 1 each 0    lisinopril (ZESTRIL) 20 mg tablet Take 1 tablet (20 mg total) by mouth daily 30 tablet 0     No current facility-administered medications for this visit  Current Outpatient Medications on File Prior to Visit   Medication Sig    Blood Pressure Monitoring (Blood Pressure Cuff) MISC Use 2 (two) times a day Omron, Upper arm, large cuff    lisinopril (ZESTRIL) 20 mg tablet Take 1 tablet (20 mg total) by mouth daily     No current facility-administered medications on file prior to visit  She is allergic to aspirin; calcium carbonate; ibuprofen; and penicillins       Review of Systems   Gastrointestinal: Positive for abdominal distention and abdominal pain  Genitourinary: Positive for menstrual problem  Negative for difficulty urinating and pelvic pain  Objective: There were no vitals taken for this visit  Physical Exam  Abdominal:      Palpations: There is mass (Fundus to 17 cm)  Tenderness: There is no abdominal tenderness  There is no guarding or rebound  Hernia: No hernia is present   There is no hernia in the left inguinal area or right inguinal area  Genitourinary:     General: Normal vulva  Labia:         Right: No rash, tenderness or lesion  Left: No rash, tenderness or lesion  Vagina: Normal       Cervix: Normal       Uterus: Enlarged  Comments: Uterus irregular in contour consistent with 17 week gestation  Unable of evaluate adnexa on pelvic exam  Lymphadenopathy:      Lower Body: No right inguinal adenopathy  No left inguinal adenopathy  AMB US Pelvic Non OB   Date/Time: 4/7/2021 8:15 AM  Performed by: Iman Johnson  Authorized by: Ale Angela DO   Universal Protocol:  Patient identity confirmed: verbally with patient        Procedure details:     Technique:  Transvaginal US, Non-OB    Position: lithotomy exam    Uterine findings:     Length (cm): 16 99    Height (cm):  8 89    Width (cm):  15 3  Left ovary findings:     Left ovary:  Unable to visualize  Right ovary findings:     Right ovary:  Unable to visualize  Other findings:     Free pelvic fluid: not identified      Free peritoneal fluid: not identified    Post-Procedure Details:     Impression:  Enlarged anteverted uterus demonstrates multiple large fibroids  Largest are left subserosal 2 6cm, left fundal intramural 9 1cm, right intramural 5 8cm, anterior intramural 2 9cm, and anterior mid lower uterine segment 5 1cm  The uterine measurement is as close as possible as the whole uterus is larger than the ultrasound field of view  The endometrium is not identified secondary to the fibroids  The ovaries are not visualized transvaginally or transabdominally due to shadowing from the fibroids  No free fluid     Tolerance:   Tolerated well, no immediate complications    Complications: no complications    Additional Procedure Comments:      [employer] P5 transvaginal transducer E8C with Serial Number [id number] was used during procedure and subsequently cleaned with high level disinfection utilizing the [employer] Probe        Ultrasound performed at:   [place] [address] [facility]  [address]  [address], [address]   Phone: [phone]  Fax:  [phone]     Endometrial biopsy   Date/Time: 4/7/2021 8:16 AM  Performed by: Annemarie Jernigan  Authorized by: Rodney Henry DO   Universal Protocol:  Patient understanding: patient states understanding of the procedure being performed  Patient identity confirmed: verbally with patient        Indication:     Indications: Other disorder of menstruation and other abnormal bleeding from female genital tract    Procedure:     Procedure: endometrial biopsy with Pipelle      A bivalve speculum was placed in the vagina: yes      Cervix cleaned and prepped: yes      Specimen collected: specimen collected and sent to pathology      Patient tolerated procedure well with no complications: yes    Sonohysterogram   Date/Time: 4/7/2021 8:16 AM  Performed by: Annemarie Jernigan  Authorized by: Rodney Henry DO   Universal Protocol:  Patient identity confirmed: verbally with patient        Pre-procedure:     Prepped with: Betadine    Procedure:     Cervix cleaned and prepped: yes      Catheter inserted: yes      Uterine cavity distended with saline: yes    Post-procedure:     Patient observed: yes      Post procedure instructions given to patient: yes      Patient tolerated procedure well with no complications: yes    Comments:      Sonohysterogram demonstrates a 4 1 submucosal fibroid    type 2

## 2021-04-07 NOTE — PROGRESS NOTES
AMB US Pelvic Non OB    Date/Time: 4/7/2021 8:15 AM  Performed by: Joana Guevara  Authorized by: Cat Mercado DO   Universal Protocol:  Patient identity confirmed: verbally with patient      Procedure details:     Technique:  Transvaginal US, Non-OB    Position: lithotomy exam    Uterine findings:     Length (cm): 16 99    Height (cm):  8 89    Width (cm):  15 3  Left ovary findings:     Left ovary:  Unable to visualize  Right ovary findings:     Right ovary:  Unable to visualize  Other findings:     Free pelvic fluid: not identified      Free peritoneal fluid: not identified    Post-Procedure Details:     Impression:  Enlarged anteverted uterus demonstrates multiple large fibroids  Largest are left subserosal 2 6cm, left fundal intramural 9 1cm, right intramural 5 8cm, anterior intramural 2 9cm, and anterior mid lower uterine segment 5 1cm  The uterine measurement is as close as possible as the whole uterus is larger than the ultrasound field of view  The endometrium is not identified secondary to the fibroids  The ovaries are not visualized transvaginally or transabdominally due to shadowing from the fibroids  No free fluid     Tolerance: Tolerated well, no immediate complications    Complications: no complications    Additional Procedure Comments:      [employer] P5 transvaginal transducer E8C with Serial Number [id number] was used during procedure and subsequently cleaned with high level disinfection utilizing the [employer]       Ultrasound performed at:     [facility] for Ul  [facility] [address]  [address]  [address], [address]  Phone: [phone]  Fax:  [phone]    Endometrial biopsy    Date/Time: 4/7/2021 8:16 AM  Performed by: Joana Guevara  Authorized by: Cat Mercado DO   Universal Protocol:  Patient understanding: patient states understanding of the procedure being performed  Patient identity confirmed: verbally with patient      Indication:     Indications: Other disorder of menstruation and other abnormal bleeding from female genital tract    Procedure:     Procedure: endometrial biopsy with Pipelle      A bivalve speculum was placed in the vagina: yes      Cervix cleaned and prepped: yes      Specimen collected: specimen collected and sent to pathology      Patient tolerated procedure well with no complications: yes    Sonohysterogram    Date/Time: 4/7/2021 8:16 AM  Performed by: Nery Yusuf  Authorized by: Felipe Beach DO   Universal Protocol:  Patient identity confirmed: verbally with patient      Pre-procedure:     Prepped with: Betadine    Procedure:     Cervix cleaned and prepped: yes      Catheter inserted: yes      Uterine cavity distended with saline: yes    Post-procedure:     Patient observed: yes      Post procedure instructions given to patient: yes      Patient tolerated procedure well with no complications: yes    Comments:      Sonohysterogram demonstrates a 4 1 submucosal fibroid

## 2021-04-07 NOTE — PROGRESS NOTES
Pt  Needs Lupron injection sent to [place] in [facility]  Pt  Has no insurance but has applied for insurance  Please call Pt

## 2021-04-09 ENCOUNTER — DOCUMENTATION (OUTPATIENT)
Dept: GYNECOLOGY | Facility: CLINIC | Age: 51
End: 2021-04-09

## 2021-04-09 ENCOUNTER — HOSPITAL ENCOUNTER (OUTPATIENT)
Dept: INFUSION CENTER | Facility: CLINIC | Age: 51
Discharge: HOME | End: 2021-04-09

## 2021-04-09 DIAGNOSIS — D50.0 IRON DEFICIENCY ANEMIA DUE TO CHRONIC BLOOD LOSS: Primary | ICD-10-CM

## 2021-04-09 PROCEDURE — 96365 THER/PROPH/DIAG IV INF INIT: CPT

## 2021-04-09 PROCEDURE — 96366 THER/PROPH/DIAG IV INF ADDON: CPT

## 2021-04-09 RX ORDER — SODIUM CHLORIDE 9 MG/ML
20 INJECTION, SOLUTION INTRAVENOUS ONCE
Status: COMPLETED | OUTPATIENT
Start: 2021-04-09 | End: 2021-04-09

## 2021-04-09 RX ORDER — SODIUM CHLORIDE 9 MG/ML
20 INJECTION, SOLUTION INTRAVENOUS ONCE
Status: CANCELLED | OUTPATIENT
Start: 2021-04-12

## 2021-04-09 RX ADMIN — SODIUM CHLORIDE 200 MG: 9 INJECTION, SOLUTION INTRAVENOUS at 13:40

## 2021-04-09 RX ADMIN — SODIUM CHLORIDE 20 ML/HR: 0.9 INJECTION, SOLUTION INTRAVENOUS at 13:40

## 2021-04-09 NOTE — PROGRESS NOTES
Patient has decided to hold off on the Lupron for now  Wants to get surgery as soon as her insurance starts  awating paperwork and acceptance

## 2021-04-09 NOTE — PLAN OF CARE
Problem: Potential for Falls  Goal: Patient will remain free of falls  Description: INTERVENTIONS:  - Assess patient frequently for physical needs  -  Identify cognitive and physical deficits and behaviors that affect risk of falls    -  [place] fall precautions as indicated by assessment   - Educate patient/family on patient safety including physical limitations  - Instruct patient to call for assistance with activity based on assessment  - Modify environment to reduce risk of injury  - Consider OT/PT consult to assist with strengthening/mobility  Outcome: Progressing

## 2021-04-09 NOTE — PROGRESS NOTES
Patient tolerated Venofer infusion well with no complications  Pt is aware of all future appointments   Declined AVS

## 2021-04-12 ENCOUNTER — HOSPITAL ENCOUNTER (OUTPATIENT)
Dept: INFUSION CENTER | Facility: CLINIC | Age: 51
Discharge: HOME | End: 2021-04-12

## 2021-04-12 ENCOUNTER — APPOINTMENT (OUTPATIENT)
Dept: LAB | Facility: MEDICAL CENTER | Age: 51
End: 2021-04-12

## 2021-04-12 VITALS
RESPIRATION RATE: 18 BRPM | DIASTOLIC BLOOD PRESSURE: 84 MMHG | SYSTOLIC BLOOD PRESSURE: 135 MMHG | HEART RATE: 92 BPM | TEMPERATURE: 97.5 F

## 2021-04-12 DIAGNOSIS — D50.0 IRON DEFICIENCY ANEMIA DUE TO CHRONIC BLOOD LOSS: Primary | ICD-10-CM

## 2021-04-12 DIAGNOSIS — D64.9 ANEMIA, UNSPECIFIED TYPE: ICD-10-CM

## 2021-04-12 LAB
BASOPHILS # BLD AUTO: 0.04 THOUSANDS/ΜL (ref 0–0.1)
BASOPHILS NFR BLD AUTO: 1 % (ref 0–1)
EOSINOPHIL # BLD AUTO: 0.23 THOUSAND/ΜL (ref 0–0.61)
EOSINOPHIL NFR BLD AUTO: 3 % (ref 0–6)
ERYTHROCYTE [DISTWIDTH] IN BLOOD BY AUTOMATED COUNT: 32.3 % (ref 11.6–15.1)
HCT VFR BLD AUTO: 31.6 % (ref 34.8–46.1)
HGB BLD-MCNC: 8.7 G/DL (ref 11.5–15.4)
IMM GRANULOCYTES # BLD AUTO: 0.05 THOUSAND/UL (ref 0–0.2)
IMM GRANULOCYTES NFR BLD AUTO: 1 % (ref 0–2)
LYMPHOCYTES # BLD AUTO: 3.27 THOUSANDS/ΜL (ref 0.6–4.47)
LYMPHOCYTES NFR BLD AUTO: 40 % (ref 14–44)
MCH RBC QN AUTO: 21.8 PG (ref 26.8–34.3)
MCHC RBC AUTO-ENTMCNC: 27.5 G/DL (ref 31.4–37.4)
MCV RBC AUTO: 79 FL (ref 82–98)
MONOCYTES # BLD AUTO: 0.65 THOUSAND/ΜL (ref 0.17–1.22)
MONOCYTES NFR BLD AUTO: 8 % (ref 4–12)
NEUTROPHILS # BLD AUTO: 3.86 THOUSANDS/ΜL (ref 1.85–7.62)
NEUTS SEG NFR BLD AUTO: 47 % (ref 43–75)
NRBC BLD AUTO-RTO: 1 /100 WBCS
PLATELET # BLD AUTO: 501 THOUSANDS/UL (ref 149–390)
PMV BLD AUTO: 10.4 FL (ref 8.9–12.7)
RBC # BLD AUTO: 4 MILLION/UL (ref 3.81–5.12)
WBC # BLD AUTO: 8.1 THOUSAND/UL (ref 4.31–10.16)

## 2021-04-12 PROCEDURE — 36415 COLL VENOUS BLD VENIPUNCTURE: CPT

## 2021-04-12 PROCEDURE — 85025 COMPLETE CBC W/AUTO DIFF WBC: CPT

## 2021-04-12 PROCEDURE — 96365 THER/PROPH/DIAG IV INF INIT: CPT

## 2021-04-12 RX ORDER — SODIUM CHLORIDE 9 MG/ML
20 INJECTION, SOLUTION INTRAVENOUS ONCE
Status: COMPLETED | OUTPATIENT
Start: 2021-04-12 | End: 2021-04-12

## 2021-04-12 RX ORDER — SODIUM CHLORIDE 9 MG/ML
20 INJECTION, SOLUTION INTRAVENOUS ONCE
Status: CANCELLED | OUTPATIENT
Start: 2021-04-15

## 2021-04-12 RX ADMIN — SODIUM CHLORIDE 20 ML/HR: 0.9 INJECTION, SOLUTION INTRAVENOUS at 11:40

## 2021-04-12 RX ADMIN — SODIUM CHLORIDE 200 MG: 9 INJECTION, SOLUTION INTRAVENOUS at 11:48

## 2021-04-14 ENCOUNTER — TELEPHONE (OUTPATIENT)
Dept: GYNECOLOGY | Facility: CLINIC | Age: 51
End: 2021-04-14

## 2021-04-14 DIAGNOSIS — D50.0 IRON DEFICIENCY ANEMIA DUE TO CHRONIC BLOOD LOSS: Primary | ICD-10-CM

## 2021-04-14 NOTE — TELEPHONE ENCOUNTER
LM on VM to inform patient that another CBC order has been placed to complete after iron infusion tomorrow

## 2021-04-15 ENCOUNTER — APPOINTMENT (OUTPATIENT)
Dept: LAB | Facility: MEDICAL CENTER | Age: 51
End: 2021-04-15
Payer: COMMERCIAL

## 2021-04-15 ENCOUNTER — HOSPITAL ENCOUNTER (OUTPATIENT)
Dept: INFUSION CENTER | Facility: CLINIC | Age: 51
Discharge: HOME | End: 2021-04-15

## 2021-04-15 VITALS
TEMPERATURE: 98.2 F | RESPIRATION RATE: 18 BRPM | DIASTOLIC BLOOD PRESSURE: 88 MMHG | SYSTOLIC BLOOD PRESSURE: 129 MMHG | HEART RATE: 97 BPM

## 2021-04-15 DIAGNOSIS — D50.0 IRON DEFICIENCY ANEMIA DUE TO CHRONIC BLOOD LOSS: Primary | ICD-10-CM

## 2021-04-15 LAB
BASOPHILS # BLD AUTO: 0.05 THOUSANDS/ΜL (ref 0–0.1)
BASOPHILS NFR BLD AUTO: 1 % (ref 0–1)
EOSINOPHIL # BLD AUTO: 0.16 THOUSAND/ΜL (ref 0–0.61)
EOSINOPHIL NFR BLD AUTO: 2 % (ref 0–6)
HCT VFR BLD AUTO: 34.1 % (ref 34.8–46.1)
HGB BLD-MCNC: 9.4 G/DL (ref 11.5–15.4)
IMM GRANULOCYTES # BLD AUTO: 0.03 THOUSAND/UL (ref 0–0.2)
IMM GRANULOCYTES NFR BLD AUTO: 0 % (ref 0–2)
LYMPHOCYTES # BLD AUTO: 3.1 THOUSANDS/ΜL (ref 0.6–4.47)
LYMPHOCYTES NFR BLD AUTO: 38 % (ref 14–44)
MCH RBC QN AUTO: 22.2 PG (ref 26.8–34.3)
MCHC RBC AUTO-ENTMCNC: 27.6 G/DL (ref 31.4–37.4)
MCV RBC AUTO: 81 FL (ref 82–98)
MONOCYTES # BLD AUTO: 0.61 THOUSAND/ΜL (ref 0.17–1.22)
MONOCYTES NFR BLD AUTO: 8 % (ref 4–12)
NEUTROPHILS # BLD AUTO: 4.23 THOUSANDS/ΜL (ref 1.85–7.62)
NEUTS SEG NFR BLD AUTO: 51 % (ref 43–75)
NRBC BLD AUTO-RTO: 0 /100 WBCS
PLATELET # BLD AUTO: 587 THOUSANDS/UL (ref 149–390)
PMV BLD AUTO: 10.4 FL (ref 8.9–12.7)
RBC # BLD AUTO: 4.23 MILLION/UL (ref 3.81–5.12)
WBC # BLD AUTO: 8.18 THOUSAND/UL (ref 4.31–10.16)

## 2021-04-15 PROCEDURE — 36415 COLL VENOUS BLD VENIPUNCTURE: CPT | Performed by: OBSTETRICS & GYNECOLOGY

## 2021-04-15 PROCEDURE — 85025 COMPLETE CBC W/AUTO DIFF WBC: CPT | Performed by: OBSTETRICS & GYNECOLOGY

## 2021-04-15 PROCEDURE — 96365 THER/PROPH/DIAG IV INF INIT: CPT

## 2021-04-15 RX ORDER — SODIUM CHLORIDE 9 MG/ML
20 INJECTION, SOLUTION INTRAVENOUS ONCE
Status: COMPLETED | OUTPATIENT
Start: 2021-04-15 | End: 2021-04-15

## 2021-04-15 RX ORDER — SODIUM CHLORIDE 9 MG/ML
20 INJECTION, SOLUTION INTRAVENOUS ONCE
Status: CANCELLED | OUTPATIENT
Start: 2021-04-19

## 2021-04-15 RX ADMIN — SODIUM CHLORIDE 20 ML/HR: 0.9 INJECTION, SOLUTION INTRAVENOUS at 12:12

## 2021-04-15 RX ADMIN — SODIUM CHLORIDE 200 MG: 9 INJECTION, SOLUTION INTRAVENOUS at 12:22

## 2021-04-15 NOTE — PLAN OF CARE
Problem: Potential for Falls  Goal: Patient will remain free of falls  Description: INTERVENTIONS:  - Assess patient frequently for physical needs  -  Identify cognitive and physical deficits and behaviors that affect risk of falls    -  [place] fall precautions as indicated by assessment   - Educate patient/family on patient safety including physical limitations  - Instruct patient to call for assistance with activity based on assessment  - Modify environment to reduce risk of injury  - Consider OT/PT consult to assist with strengthening/mobility  Outcome: Progressing

## 2021-04-15 NOTE — PROGRESS NOTES
Pt arrived to unit without complaint, tolerated Venofer infusion without any adverse reaction   Pt left unit in stable condition without question or concern, pt declines AVS today

## 2021-04-19 ENCOUNTER — HOSPITAL ENCOUNTER (OUTPATIENT)
Dept: MAMMOGRAPHY | Facility: MEDICAL CENTER | Age: 51
Discharge: HOME | End: 2021-04-19

## 2021-04-19 ENCOUNTER — HOSPITAL ENCOUNTER (OUTPATIENT)
Dept: INFUSION CENTER | Facility: CLINIC | Age: 51
Discharge: HOME | End: 2021-04-19

## 2021-04-19 VITALS — BODY MASS INDEX: 36.5 KG/M2 | HEIGHT: 63 IN | WEIGHT: 206 LBS

## 2021-04-19 VITALS
RESPIRATION RATE: 18 BRPM | HEART RATE: 96 BPM | SYSTOLIC BLOOD PRESSURE: 136 MMHG | DIASTOLIC BLOOD PRESSURE: 88 MMHG | TEMPERATURE: 99 F

## 2021-04-19 DIAGNOSIS — D50.0 IRON DEFICIENCY ANEMIA DUE TO CHRONIC BLOOD LOSS: Primary | ICD-10-CM

## 2021-04-19 DIAGNOSIS — Z12.31 SCREENING MAMMOGRAM, ENCOUNTER FOR: ICD-10-CM

## 2021-04-19 PROCEDURE — 96365 THER/PROPH/DIAG IV INF INIT: CPT

## 2021-04-19 PROCEDURE — 77067 SCR MAMMO BI INCL CAD: CPT

## 2021-04-19 PROCEDURE — 77063 BREAST TOMOSYNTHESIS BI: CPT

## 2021-04-19 RX ORDER — SODIUM CHLORIDE 9 MG/ML
20 INJECTION, SOLUTION INTRAVENOUS ONCE
Status: COMPLETED | OUTPATIENT
Start: 2021-04-19 | End: 2021-04-19

## 2021-04-19 RX ORDER — SODIUM CHLORIDE 9 MG/ML
20 INJECTION, SOLUTION INTRAVENOUS ONCE
Status: CANCELLED | OUTPATIENT
Start: 2021-04-23

## 2021-04-19 RX ADMIN — SODIUM CHLORIDE 200 MG: 9 INJECTION, SOLUTION INTRAVENOUS at 10:04

## 2021-04-19 RX ADMIN — SODIUM CHLORIDE 20 ML/HR: 0.9 INJECTION, SOLUTION INTRAVENOUS at 09:58

## 2021-04-19 NOTE — PLAN OF CARE
Problem: Potential for Falls  Goal: Patient will remain free of falls  Description: INTERVENTIONS:  - Assess patient frequently for physical needs  -  Identify cognitive and physical deficits and behaviors that affect risk of falls    -  [place] fall precautions as indicated by assessment   - Educate patient/family on patient safety including physical limitations  - Instruct patient to call for assistance with activity based on assessment  - Modify environment to reduce risk of injury  - Consider OT/PT consult to assist with strengthening/mobility  Outcome: Progressing

## 2021-04-21 ENCOUNTER — TELEPHONE (OUTPATIENT)
Dept: GYNECOLOGY | Facility: CLINIC | Age: 51
End: 2021-04-21

## 2021-04-21 NOTE — TELEPHONE ENCOUNTER
LM on VM to [facility] - need to determine time frame that patient anticipates insurance coverage and if she has had another episode of bleeding  Last H/H 4/15/21 9 4/34  1  She continues to receive venofer infusions  Need to determine when these can stop  Per last phone note, patient was interested in surgery, but was awaiting ins coverage

## 2021-04-23 ENCOUNTER — HOSPITAL ENCOUNTER (OUTPATIENT)
Dept: INFUSION CENTER | Facility: CLINIC | Age: 51
Discharge: HOME | End: 2021-04-23

## 2021-04-23 VITALS
SYSTOLIC BLOOD PRESSURE: 174 MMHG | DIASTOLIC BLOOD PRESSURE: 101 MMHG | RESPIRATION RATE: 16 BRPM | TEMPERATURE: 97.8 F | HEART RATE: 91 BPM

## 2021-04-23 DIAGNOSIS — D50.0 IRON DEFICIENCY ANEMIA DUE TO CHRONIC BLOOD LOSS: ICD-10-CM

## 2021-04-23 NOTE — PROGRESS NOTES
Patient here for venofer infusion, vitals stable however blood pressure noted to be elevated, patient stated that that is normal for her and offered up no complaints  Multiple IV sticks attempted by 3 RNs, unsuccessful  Due to patient being here twice a week, an appointment was added on at the end  Patient aware and expressed agreement  Patient aware of appointment schedule, declined AVS, left infusion center in baseline condition

## 2021-04-23 NOTE — PLAN OF CARE

## 2021-04-26 ENCOUNTER — HOSPITAL ENCOUNTER (OUTPATIENT)
Dept: INFUSION CENTER | Facility: CLINIC | Age: 51
Discharge: HOME | End: 2021-04-26

## 2021-04-26 VITALS
SYSTOLIC BLOOD PRESSURE: 144 MMHG | DIASTOLIC BLOOD PRESSURE: 90 MMHG | HEART RATE: 101 BPM | TEMPERATURE: 97.6 F | RESPIRATION RATE: 16 BRPM

## 2021-04-26 DIAGNOSIS — D50.0 IRON DEFICIENCY ANEMIA DUE TO CHRONIC BLOOD LOSS: Primary | ICD-10-CM

## 2021-04-26 PROCEDURE — 96365 THER/PROPH/DIAG IV INF INIT: CPT

## 2021-04-26 RX ORDER — SODIUM CHLORIDE 9 MG/ML
20 INJECTION, SOLUTION INTRAVENOUS ONCE
Status: CANCELLED | OUTPATIENT
Start: 2021-04-29

## 2021-04-26 RX ORDER — SODIUM CHLORIDE 9 MG/ML
20 INJECTION, SOLUTION INTRAVENOUS ONCE
Status: COMPLETED | OUTPATIENT
Start: 2021-04-26 | End: 2021-04-26

## 2021-04-26 RX ADMIN — SODIUM CHLORIDE 200 MG: 9 INJECTION, SOLUTION INTRAVENOUS at 10:59

## 2021-04-26 RX ADMIN — SODIUM CHLORIDE 20 ML/HR: 0.9 INJECTION, SOLUTION INTRAVENOUS at 10:45

## 2021-04-26 NOTE — PLAN OF CARE
Problem: SAFETY ADULT  Goal: Patient will remain free of falls  Description: INTERVENTIONS:  - Assess patient frequently for physical needs  -  Identify cognitive and physical deficits and behaviors that affect risk of falls  -  [place] fall precautions as indicated by assessment   - Educate patient/family on patient safety including physical limitations  - Instruct patient to call for assistance with activity based on assessment  - Modify environment to reduce risk of injury  - Consider OT/PT consult to assist with strengthening/mobility  Outcome: Progressing     Problem: Knowledge Deficit  Goal: Patient/family/caregiver demonstrates understanding of disease process, treatment plan, medications, and discharge instructions  Description: Complete learning assessment and assess knowledge base    Interventions:  - Provide teaching at level of understanding  - Provide teaching via preferred learning methods  Outcome: Progressing

## 2021-04-27 ENCOUNTER — TELEPHONE (OUTPATIENT)
Dept: GYNECOLOGY | Facility: CLINIC | Age: 51
End: 2021-04-27

## 2021-04-27 DIAGNOSIS — I10 ESSENTIAL HYPERTENSION: ICD-10-CM

## 2021-04-27 RX ORDER — LISINOPRIL 20 MG/1
TABLET ORAL
Qty: 30 TABLET | Refills: 0 | Status: SHIPPED | OUTPATIENT
Start: 2021-04-27 | End: 2021-05-25

## 2021-04-27 NOTE — TELEPHONE ENCOUNTER
Pt is already scheduled for 5/5/2021 as a new patient 
Principal Discharge DX:	Fever  Secondary Diagnosis:	Febrile seizure

## 2021-04-27 NOTE — TELEPHONE ENCOUNTER
Left 2nd message on VM to determine time frame of insurance coverage to schedule surgery  Also need to assess bleeding pattern at present

## 2021-04-28 ENCOUNTER — HOSPITAL ENCOUNTER (OUTPATIENT)
Dept: ULTRASOUND IMAGING | Facility: CLINIC | Age: 51
Discharge: HOME | End: 2021-04-28

## 2021-04-28 ENCOUNTER — TELEPHONE (OUTPATIENT)
Dept: MAMMOGRAPHY | Facility: CLINIC | Age: 51
End: 2021-04-28

## 2021-04-28 ENCOUNTER — HOSPITAL ENCOUNTER (OUTPATIENT)
Dept: MAMMOGRAPHY | Facility: CLINIC | Age: 51
Discharge: HOME | End: 2021-04-28

## 2021-04-28 VITALS — BODY MASS INDEX: 36.5 KG/M2 | WEIGHT: 206 LBS | HEIGHT: 63 IN

## 2021-04-28 DIAGNOSIS — R92.8 ABNORMAL MAMMOGRAM: ICD-10-CM

## 2021-04-28 PROCEDURE — 77065 DX MAMMO INCL CAD UNI: CPT

## 2021-04-28 PROCEDURE — G0279 TOMOSYNTHESIS, MAMMO: HCPCS

## 2021-04-28 PROCEDURE — 76642 ULTRASOUND BREAST LIMITED: CPT

## 2021-04-28 NOTE — PROGRESS NOTES
Met with patient and Dr Jamie Medrano   regarding recommendation for;    _____ RIGHT ____x__LEFT      __x___Ultrasound guided  ______Stereotactic breast biopsy  __X___Verbalized understanding        Blood thinners:  _____yes ___x__no    Date stopped: ___N/A____    Biopsy teaching sheet given:  __X___yes ____no    Pt given contact information and adv to call with any questions/needs

## 2021-04-29 ENCOUNTER — HOSPITAL ENCOUNTER (OUTPATIENT)
Dept: INFUSION CENTER | Facility: CLINIC | Age: 51
Discharge: HOME | End: 2021-04-29

## 2021-04-29 VITALS
HEART RATE: 92 BPM | SYSTOLIC BLOOD PRESSURE: 136 MMHG | DIASTOLIC BLOOD PRESSURE: 86 MMHG | TEMPERATURE: 98.9 F | RESPIRATION RATE: 18 BRPM

## 2021-04-29 DIAGNOSIS — D50.0 IRON DEFICIENCY ANEMIA DUE TO CHRONIC BLOOD LOSS: Primary | ICD-10-CM

## 2021-04-29 PROCEDURE — 96365 THER/PROPH/DIAG IV INF INIT: CPT

## 2021-04-29 RX ORDER — SODIUM CHLORIDE 9 MG/ML
20 INJECTION, SOLUTION INTRAVENOUS ONCE
Status: COMPLETED | OUTPATIENT
Start: 2021-04-29 | End: 2021-04-29

## 2021-04-29 RX ORDER — SODIUM CHLORIDE 9 MG/ML
20 INJECTION, SOLUTION INTRAVENOUS ONCE
Status: CANCELLED | OUTPATIENT
Start: 2021-04-29

## 2021-04-29 RX ADMIN — SODIUM CHLORIDE 200 MG: 9 INJECTION, SOLUTION INTRAVENOUS at 14:25

## 2021-04-29 RX ADMIN — SODIUM CHLORIDE 20 ML/HR: 0.9 INJECTION, SOLUTION INTRAVENOUS at 14:10

## 2021-04-29 NOTE — PLAN OF CARE
Problem: Potential for Falls  Goal: Patient will remain free of falls  Description: INTERVENTIONS:  - Assess patient frequently for physical needs  -  Identify cognitive and physical deficits and behaviors that affect risk of falls    -  [place] fall precautions as indicated by assessment   - Educate patient/family on patient safety including physical limitations  - Instruct patient to call for assistance with activity based on assessment  - Modify environment to reduce risk of injury  - Consider OT/PT consult to assist with strengthening/mobility  Outcome: Progressing

## 2021-05-04 NOTE — PROGRESS NOTES
[facility]    NAME: Esther Gonzalez  AGE: 45 y o  SEX: female  : 1970     DATE: 2021     Assessment and Plan:     Problem List Items Addressed This Visit        Cardiovascular and Mediastinum    Essential hypertension     Patient was encouraged to add HCTZ 25 mg daily  She will continue the lisinopril 20 mg daily  Follow-up in 1 month to recheck  Call with concerns in the interim  Relevant Medications    hydrochlorothiazide (HYDRODIURIL) 25 mg tablet    Other Relevant Orders    CBC and differential    Comprehensive metabolic panel    Lipid Panel with Direct LDL reflex    TSH, 3rd generation with Free T4 reflex       Other    Anemia     Notes that she recently had infusion of iron (set up by her GYN)  She feels better but normal  Recheck CBC  Relevant Orders    CBC and differential    Obesity     BMI Counseling: Body mass index is 36 77 kg/m²  The BMI is above normal  Nutrition recommendations include decreasing overall calorie intake and 3-5 servings of fruits/vegetables daily  Exercise recommendations include moderate aerobic physical activity for 150 minutes/week and exercising 3-5 times per week  Relevant Orders    Comprehensive metabolic panel    Lipid Panel with Direct LDL reflex    TSH, 3rd generation with Free T4 reflex    Sickle cell trait ([address] )     Recheck labs as above  Relevant Orders    CBC and differential    Aspirin allergy     Patient sent to allergist as she would like to be tested to confirm she still has this allergy           Relevant Orders    Ambulatory referral to Allergy      Other Visit Diagnoses     Annual physical exam    -  Primary    Diabetes mellitus screening        Relevant Orders    Comprehensive metabolic panel    Lipid screening        Relevant Orders    Lipid Panel with Direct LDL reflex    Screening for HIV (human immunodeficiency virus) Relevant Orders    Human Immunodeficiency Virus 1/2 Antigen / Antibody ( Fourth Generation) with Reflex Testing      The USPSTF recommendation for HIV screening in all patients between 13and 72years old (once in lifetime or annually with risk factors) was discussed with the patient  The patient agreed to testing  Immunizations and preventive care screenings were discussed with patient today  Appropriate education was printed on patient's after visit summary  Counseling:  Dental Health: discussed importance of regular dental visits  · Exercise: the importance of regular exercise/physical activity was discussed  Recommend exercise 3-5 times per week for at least 30 minutes  Return in about 1 month (around 6/5/2021) for Recheck  Chief Complaint:     Chief Complaint   Patient presents with    Physical Exam      History of Present Illness:     Adult Annual Physical   Patient here for a comprehensive physical exam  The patient reports problems - as below  The patient reports that current blood pressure medications include lisinopril 20 mg daily  The patient denies symptoms of poor control such as chest pain, shortness of breath, leg swelling, vision changes, headaches, or dizziness  The patient reports 1/2 cups of coffee daily for caffeine intake and unlimited salt intake  She has a hx of DONITA and last labs showed Hgb 9 4 on 4/15/21 - up from 7 4 earlier in the month  She follows with OB/GYN since this is related to chronic blood loss from menorrhagia  She reports that she had an infusion on 4/29/21 and notes that she is feeling better but not normal - still a little tired  She notes that she has 2 lumps in the left breast so will be getting a biopsy in a few weeks  Diet and Physical Activity  · Diet/Nutrition: well balanced diet and consuming 3-5 servings of fruits/vegetables daily  · Exercise: none        Depression Screening  PHQ-9 Depression Screening    PHQ-9: Frequency of the following problems over the past two weeks:      Little interest or pleasure in doing things: 0 - not at all  Feeling down, depressed, or hopeless: 0 - not at all  PHQ-2 Score: 0       General Health  · Sleep: sleeps poorly and notes that she works nights from 9-9 and sleeps about 2-5 hours a day  · Hearing: normal - bilateral   · Vision: vision problems: blurry, most recent eye exam <1 year ago and notes that she used to have bifocals (but no vision coverage currently)  · Dental: no dental visits for >1 year  /GYN Health  · Seeing GYN as above     Review of Systems:     Review of Systems   Constitutional: Negative for chills and fever  HENT: Negative for rhinorrhea and sore throat  Eyes: Negative for visual disturbance  Respiratory: Positive for shortness of breath  Negative for cough and wheezing  Cardiovascular: Positive for palpitations (before her infusion)  Negative for chest pain and leg swelling  Gastrointestinal: Negative for abdominal pain, blood in stool, constipation, diarrhea, nausea and vomiting  Endocrine: Negative for polydipsia and polyuria  Genitourinary: Negative for dysuria and frequency  Musculoskeletal: Negative for arthralgias and myalgias  Skin: Negative for rash  Neurological: Negative for dizziness, syncope and headaches  Hematological: Does not bruise/bleed easily  Psychiatric/Behavioral: Negative for dysphoric mood  The patient is not nervous/anxious         Past Medical History:     Past Medical History:   Diagnosis Date    Hypertension     Lumbar radiculopathy 2016      Past Surgical History:     Past Surgical History:   Procedure Laterality Date     SECTION      REDUCTION MAMMAPLASTY Bilateral       Social History:     E-Cigarette/Vaping    E-Cigarette Use Never User      E-Cigarette/Vaping Substances    Nicotine No     THC No     CBD No     Flavoring No     Other No     Unknown No      Social History Socioeconomic History    Marital status: /Civil Union     Spouse name: None    Number of children: None    Years of education: None    Highest education level: None   Occupational History    None   Social Needs    Financial resource strain: None    Food insecurity     Worry: None     Inability: None    Transportation needs     Medical: None     Non-medical: None   Tobacco Use    Smoking status: Never Smoker    Smokeless tobacco: Never Used   Substance and Sexual Activity    Alcohol use: Not Currently     Frequency: Monthly or less     Drinks per session: 1 or 2    Drug use: Never    Sexual activity: None   Lifestyle    Physical activity     Days per week: None     Minutes per session: None    Stress: None   Relationships    Social connections     Talks on phone: None     Gets together: None     Attends Worship service: None     Active member of club or organization: None     Attends meetings of clubs or organizations: None     Relationship status: None    Intimate partner violence     Fear of current or ex partner: None     Emotionally abused: None     Physically abused: None     Forced sexual activity: None   Other Topics Concern    None   Social History Narrative    None      Family History:     Family History   Problem Relation Age of Onset    Hypertension Mother     Diabetes Father     No Known Problems Sister     No Known Problems Daughter     No Known Problems Maternal Grandmother     No Known Problems Maternal Grandfather     No Known Problems Paternal Grandmother     No Known Problems Paternal Grandfather     No Known Problems Sister     No Known Problems Sister     No Known Problems Sister     No Known Problems Sister     No Known Problems Daughter     No Known Problems Daughter     No Known Problems Paternal Aunt     No Known Problems Son     No Known Problems Son       Current Medications:     Current Outpatient Medications   Medication Sig Dispense Refill  Blood Pressure Monitoring (Blood Pressure Cuff) MISC Use 2 (two) times a day Omron, Upper arm, large cuff 1 each 0    lisinopril (ZESTRIL) 20 mg tablet TAKE 1 TABLET BY MOUTH EVERY DAY 30 tablet 0    hydrochlorothiazide (HYDRODIURIL) 25 mg tablet Take 1 tablet (25 mg total) by mouth daily 30 tablet 1     No current facility-administered medications for this visit  Allergies: Allergies   Allergen Reactions    Aspirin      Reaction Date: 17Apr2012;     Calcium Carbonate      Reaction Date: 17Apr2012;     Ibuprofen      Reaction Date: 17Apr2012;     Penicillins       Physical Exam:     /86 (BP Location: Left arm, Patient Position: Sitting, Cuff Size: Adult)   Pulse 96   Ht 5' 2 76" (1 594 m)   Wt 93 4 kg (206 lb)   LMP 04/11/2021   SpO2 100%   BMI 36 77 kg/m²     Physical Exam  Vitals signs reviewed  Constitutional:       General: She is not in acute distress  Appearance: Normal appearance  She is well-developed  She is obese  She is not ill-appearing  HENT:      Head: Normocephalic and atraumatic  Right Ear: Tympanic membrane, ear canal and external ear normal       Left Ear: Tympanic membrane, ear canal and external ear normal    Eyes:      Conjunctiva/sclera: Conjunctivae normal       Pupils: Pupils are equal, round, and reactive to light  Neck:      Musculoskeletal: Normal range of motion and neck supple  Thyroid: No thyromegaly  Cardiovascular:      Rate and Rhythm: Normal rate and regular rhythm  Pulses: Normal pulses  Heart sounds: Normal heart sounds  No murmur  Pulmonary:      Effort: Pulmonary effort is normal       Breath sounds: Normal breath sounds  No wheezing, rhonchi or rales  Abdominal:      General: Bowel sounds are normal  There is no distension  Palpations: Abdomen is soft  There is no mass  Tenderness: There is no abdominal tenderness  Musculoskeletal:      Right lower leg: No edema  Left lower leg: No edema  Lymphadenopathy:      Cervical: No cervical adenopathy  Skin:     General: Skin is warm and dry  Findings: No rash  Neurological:      Mental Status: She is alert  Sensory: No sensory deficit        Comments: 5/5 strength in UE and LE   Psychiatric:         Mood and Affect: Mood normal          Behavior: Behavior normal           Octaviano Cazares PA-C  [facility]

## 2021-05-05 ENCOUNTER — OFFICE VISIT (OUTPATIENT)
Dept: FAMILY MEDICINE CLINIC | Facility: CLINIC | Age: 51
End: 2021-05-05

## 2021-05-05 VITALS
BODY MASS INDEX: 36.5 KG/M2 | SYSTOLIC BLOOD PRESSURE: 150 MMHG | HEIGHT: 63 IN | WEIGHT: 206 LBS | HEART RATE: 96 BPM | DIASTOLIC BLOOD PRESSURE: 86 MMHG | OXYGEN SATURATION: 100 %

## 2021-05-05 DIAGNOSIS — D57.3 SICKLE CELL TRAIT (HCC): ICD-10-CM

## 2021-05-05 DIAGNOSIS — D64.9 ANEMIA, UNSPECIFIED TYPE: ICD-10-CM

## 2021-05-05 DIAGNOSIS — Z11.4 SCREENING FOR HIV (HUMAN IMMUNODEFICIENCY VIRUS): ICD-10-CM

## 2021-05-05 DIAGNOSIS — Z13.1 DIABETES MELLITUS SCREENING: ICD-10-CM

## 2021-05-05 DIAGNOSIS — E66.9 CLASS 2 OBESITY WITH BODY MASS INDEX (BMI) OF 36.0 TO 36.9 IN ADULT, UNSPECIFIED OBESITY TYPE, UNSPECIFIED WHETHER SERIOUS COMORBIDITY PRESENT: ICD-10-CM

## 2021-05-05 DIAGNOSIS — Z88.8 ASPIRIN ALLERGY: ICD-10-CM

## 2021-05-05 DIAGNOSIS — Z13.220 LIPID SCREENING: ICD-10-CM

## 2021-05-05 DIAGNOSIS — I10 ESSENTIAL HYPERTENSION: ICD-10-CM

## 2021-05-05 DIAGNOSIS — Z00.00 ANNUAL PHYSICAL EXAM: Primary | ICD-10-CM

## 2021-05-05 PROCEDURE — 99396 PREV VISIT EST AGE 40-64: CPT | Performed by: PHYSICIAN ASSISTANT

## 2021-05-05 RX ORDER — HYDROCHLOROTHIAZIDE 25 MG/1
25 TABLET ORAL DAILY
Qty: 30 TABLET | Refills: 1 | Status: SHIPPED | OUTPATIENT
Start: 2021-05-05 | End: 2021-07-02

## 2021-05-05 NOTE — PATIENT INSTRUCTIONS

## 2021-05-06 PROBLEM — Z88.8 ASPIRIN ALLERGY: Status: ACTIVE | Noted: 2021-05-06

## 2021-05-06 NOTE — ASSESSMENT & PLAN NOTE
BMI Counseling: Body mass index is 36 77 kg/m²  The BMI is above normal  Nutrition recommendations include decreasing overall calorie intake and 3-5 servings of fruits/vegetables daily  Exercise recommendations include moderate aerobic physical activity for 150 minutes/week and exercising 3-5 times per week

## 2021-05-06 NOTE — ASSESSMENT & PLAN NOTE
Patient was encouraged to add HCTZ 25 mg daily  She will continue the lisinopril 20 mg daily  Follow-up in 1 month to recheck  Call with concerns in the interim

## 2021-05-06 NOTE — ASSESSMENT & PLAN NOTE
Notes that she recently had infusion of iron (set up by her GYN)  She feels better but normal  Recheck CBC

## 2021-05-07 ENCOUNTER — TELEPHONE (OUTPATIENT)
Dept: GYNECOLOGY | Facility: CLINIC | Age: 51
End: 2021-05-07

## 2021-05-07 NOTE — TELEPHONE ENCOUNTER
Attempting to reach patient regarding menorrhagia/anemia/iron infusions/need for repeat CBC/treatment plan  Detailed message left on VM and patient asked to return call

## 2021-05-07 NOTE — TELEPHONE ENCOUNTER
Spoke with patient who states she has no further episodes of heavy bleeding  She continues to work with a  to [facility] because she would like to proceed with hysterectomy  Last H/H 4/15/21 was 9 4/34  1  Patient instructed to call with any episode of heavy bleeding

## 2021-05-10 RX ORDER — SODIUM CHLORIDE 9 MG/ML
125 INJECTION, SOLUTION INTRAVENOUS CONTINUOUS
Status: CANCELLED | OUTPATIENT
Start: 2021-05-10

## 2021-05-12 ENCOUNTER — TELEPHONE (OUTPATIENT)
Dept: GASTROENTEROLOGY | Facility: MEDICAL CENTER | Age: 51
End: 2021-05-12

## 2021-05-12 NOTE — TELEPHONE ENCOUNTER
Patient called the office stating she had to reschedule her colonoscopy because she had never received any prep instructions  The patient is scheduled at [facility] for a colon with Dr Shilpa Myrick on 6/25/2021  Miralax/dulcolax prep instructions have been gone over on the phone and mailed to the patients home address  The patient is aware that they will receive a call with the arrival time the day prior to procedure and that they will need a  the day of the procedure  I have asked the patient to call with any questions that they might have prior to procedure   Patient is also self pay at this time and was sent the phone number for  and was instructed to call them because payment for the procedure is due before the procedure date  Patient stated she understood prep instructions and instructions on

## 2021-05-13 ENCOUNTER — IMMUNIZATIONS (OUTPATIENT)
Dept: FAMILY MEDICINE CLINIC | Facility: HOSPITAL | Age: 51
End: 2021-05-13

## 2021-05-13 DIAGNOSIS — Z23 ENCOUNTER FOR IMMUNIZATION: Primary | ICD-10-CM

## 2021-05-13 PROCEDURE — 0011A SARS-COV-2 / COVID-19 MRNA VACCINE (MODERNA) 100 MCG: CPT

## 2021-05-13 PROCEDURE — 91301 SARS-COV-2 / COVID-19 MRNA VACCINE (MODERNA) 100 MCG: CPT

## 2021-05-21 ENCOUNTER — HOSPITAL ENCOUNTER (OUTPATIENT)
Dept: ULTRASOUND IMAGING | Facility: CLINIC | Age: 51
Discharge: HOME | End: 2021-05-21

## 2021-05-21 RX ORDER — LIDOCAINE HYDROCHLORIDE 10 MG/ML
5 INJECTION, SOLUTION EPIDURAL; INFILTRATION; INTRACAUDAL; PERINEURAL ONCE
Status: DISCONTINUED | OUTPATIENT
Start: 2021-05-21 | End: 2021-05-24 | Stop reason: HOSPADM

## 2021-05-25 DIAGNOSIS — I10 ESSENTIAL HYPERTENSION: ICD-10-CM

## 2021-05-25 RX ORDER — LISINOPRIL 20 MG/1
TABLET ORAL
Qty: 30 TABLET | Refills: 0 | Status: SHIPPED | OUTPATIENT
Start: 2021-05-25 | End: 2021-06-07

## 2021-05-26 ENCOUNTER — OFFICE VISIT (OUTPATIENT)
Dept: FAMILY MEDICINE CLINIC | Facility: CLINIC | Age: 51
End: 2021-05-26
Payer: COMMERCIAL

## 2021-05-26 VITALS
OXYGEN SATURATION: 99 % | RESPIRATION RATE: 12 BRPM | DIASTOLIC BLOOD PRESSURE: 90 MMHG | HEART RATE: 96 BPM | HEIGHT: 63 IN | WEIGHT: 205 LBS | BODY MASS INDEX: 36.32 KG/M2 | SYSTOLIC BLOOD PRESSURE: 128 MMHG

## 2021-05-26 DIAGNOSIS — D50.0 IRON DEFICIENCY ANEMIA DUE TO CHRONIC BLOOD LOSS: ICD-10-CM

## 2021-05-26 DIAGNOSIS — E66.9 CLASS 2 OBESITY WITH BODY MASS INDEX (BMI) OF 36.0 TO 36.9 IN ADULT, UNSPECIFIED OBESITY TYPE, UNSPECIFIED WHETHER SERIOUS COMORBIDITY PRESENT: Primary | ICD-10-CM

## 2021-05-26 DIAGNOSIS — I10 ESSENTIAL HYPERTENSION: ICD-10-CM

## 2021-05-26 DIAGNOSIS — D64.9 ANEMIA, UNSPECIFIED TYPE: ICD-10-CM

## 2021-05-26 DIAGNOSIS — D57.3 SICKLE CELL TRAIT (HCC): ICD-10-CM

## 2021-05-26 PROCEDURE — 99212 OFFICE O/P EST SF 10 MIN: CPT | Performed by: INTERNAL MEDICINE

## 2021-05-26 RX ORDER — FERROUS SULFATE TAB EC 324 MG (65 MG FE EQUIVALENT) 324 (65 FE) MG
324 TABLET DELAYED RESPONSE ORAL
Qty: 90 TABLET | Refills: 1 | Status: SHIPPED | OUTPATIENT
Start: 2021-05-26 | End: 2021-08-30 | Stop reason: ALTCHOICE

## 2021-05-26 NOTE — PATIENT INSTRUCTIONS
Please take 1 pill of  Ferrous sulfate in the morning  Please do your blood work when you get your insurance  Continue lisinopril 20 mg daily at night, take hydrochlorothiazide in the morning

## 2021-05-27 NOTE — PROGRESS NOTES
Assessment/Plan:    No problem-specific Assessment & Plan notes found for this encounter  Diagnoses and all orders for this visit:    Class 2 obesity with body mass index (BMI) of 36 0 to 36 9 in adult, unspecified obesity type, unspecified whether serious comorbidity present  Comments:  He is trying to follow healthy diet, no formal exercise as for now  Orders:  -     Comprehensive metabolic panel; Future  -     Lipid panel; Future  -     UA (URINE) with reflex to Scope  -     HEMOGLOBIN A1C W/ EAG ESTIMATION; Future    Anemia, unspecified type  -     CBC and differential; Future  -     ferrous sulfate 324 (65 Fe) mg; Take 1 tablet (324 mg total) by mouth daily before breakfast    Essential hypertension  Comments: Well controlled on lisinopril 20 mg  Continue the same  Iron deficiency anemia due to chronic blood loss  Comments:  Her hemoglobin is getting better  She finished set of iron infusions  Will start her on iron supplementation  Follow-up blood work is ordered  Sickle cell trait (HCC)           Subjective:      Patient ID: Lorenzo Gonzalez is a 45 y o  female  Patient came today for follow-up, she does not have any medical insurance right now so she is kind of limited in Medical Services that she can obtain  She is working on her medical insurance right now  She has history of essential hypertension which is very well controlled right now being on lisinopril 20 mg daily  She has history of menorrhagia she follows up with OBGYN, currently she said that it is all controlled  She got iron infusions  Her recent hemoglobin in April 2021 was 9 4 which is significantly improved comparing to the previous 1  She said that she feels better in terms of her fatigue          The following portions of the patient's history were reviewed and updated as appropriate: allergies, current medications, past family history, past medical history, past social history, past surgical history, and problem list     Review of Systems   Constitutional: Positive for fatigue  Negative for activity change, appetite change, chills and fever  HENT: Negative for congestion, ear pain, rhinorrhea and sore throat  Respiratory: Positive for shortness of breath (Improved)  Negative for cough and wheezing  Cardiovascular: Negative for chest pain, palpitations and leg swelling  Gastrointestinal: Negative for abdominal distention, abdominal pain, diarrhea, nausea and vomiting  Genitourinary: Negative for difficulty urinating, frequency and pelvic pain  Musculoskeletal: Negative for arthralgias, back pain and neck pain  Skin: Negative for rash  Neurological: Negative for dizziness, tremors, weakness, numbness and headaches  Objective:      /90 (BP Location: Left arm, Patient Position: Sitting, Cuff Size: Standard)   Pulse 96   Resp 12   Ht 5' 2 76" (1 594 m)   Wt 93 kg (205 lb)   SpO2 99%   BMI 36 59 kg/m²          Physical Exam  Vitals signs reviewed  Constitutional:       General: She is not in acute distress  Appearance: She is well-developed  She is not diaphoretic  HENT:      Head: Normocephalic and atraumatic  Eyes:      General:         Right eye: No discharge  Left eye: No discharge  Neck:      Musculoskeletal: Normal range of motion and neck supple  Cardiovascular:      Rate and Rhythm: Normal rate and regular rhythm  Pulses: Normal pulses  Pulmonary:      Effort: Pulmonary effort is normal  No respiratory distress  Breath sounds: Normal breath sounds  No wheezing  Abdominal:      Palpations: Abdomen is soft  Musculoskeletal: Normal range of motion  Lymphadenopathy:      Cervical: No cervical adenopathy  Skin:     General: Skin is warm and dry  Neurological:      Mental Status: She is alert and oriented to person, place, and time  Cranial Nerves: No cranial nerve deficit     Psychiatric:         Speech: Speech normal          Behavior: Behavior normal          Thought Content:  Thought content normal          Judgment: Judgment normal                Current Outpatient Medications:     Blood Pressure Monitoring (Blood Pressure Cuff) MISC, Use 2 (two) times a day Omron, Upper arm, large cuff, Disp: 1 each, Rfl: 0    hydrochlorothiazide (HYDRODIURIL) 25 mg tablet, Take 1 tablet (25 mg total) by mouth daily, Disp: 30 tablet, Rfl: 1    lisinopril (ZESTRIL) 20 mg tablet, TAKE 1 TABLET BY MOUTH EVERY DAY, Disp: 30 tablet, Rfl: 0    ferrous sulfate 324 (65 Fe) mg, Take 1 tablet (324 mg total) by mouth daily before breakfast, Disp: 90 tablet, Rfl: 1

## 2021-05-28 DIAGNOSIS — I10 ESSENTIAL HYPERTENSION: ICD-10-CM

## 2021-05-28 RX ORDER — HYDROCHLOROTHIAZIDE 25 MG/1
TABLET ORAL
Qty: 30 TABLET | Refills: 1 | OUTPATIENT
Start: 2021-05-28

## 2021-06-07 DIAGNOSIS — I10 ESSENTIAL HYPERTENSION: ICD-10-CM

## 2021-06-07 RX ORDER — LISINOPRIL 20 MG/1
TABLET ORAL
Qty: 90 TABLET | Refills: 1 | Status: SHIPPED | OUTPATIENT
Start: 2021-06-07 | End: 2021-08-30 | Stop reason: SDUPTHER

## 2021-06-10 ENCOUNTER — HOSPITAL ENCOUNTER (OUTPATIENT)
Dept: ULTRASOUND IMAGING | Facility: CLINIC | Age: 51
Discharge: HOME | End: 2021-06-10
Payer: COMMERCIAL

## 2021-06-10 ENCOUNTER — HOSPITAL ENCOUNTER (OUTPATIENT)
Dept: MAMMOGRAPHY | Facility: CLINIC | Age: 51
Discharge: HOME | End: 2021-06-10
Payer: COMMERCIAL

## 2021-06-10 VITALS
HEIGHT: 62 IN | HEART RATE: 94 BPM | BODY MASS INDEX: 37.73 KG/M2 | SYSTOLIC BLOOD PRESSURE: 154 MMHG | DIASTOLIC BLOOD PRESSURE: 87 MMHG | WEIGHT: 205 LBS

## 2021-06-10 DIAGNOSIS — R92.8 ABNORMAL MAMMOGRAM: ICD-10-CM

## 2021-06-10 PROCEDURE — 88342 IMHCHEM/IMCYTCHM 1ST ANTB: CPT | Performed by: PATHOLOGY

## 2021-06-10 PROCEDURE — 19083 BX BREAST 1ST LESION US IMAG: CPT

## 2021-06-10 PROCEDURE — A4648 IMPLANTABLE TISSUE MARKER: HCPCS

## 2021-06-10 PROCEDURE — 88305 TISSUE EXAM BY PATHOLOGIST: CPT | Performed by: PATHOLOGY

## 2021-06-10 PROCEDURE — 88341 IMHCHEM/IMCYTCHM EA ADD ANTB: CPT | Performed by: PATHOLOGY

## 2021-06-10 PROCEDURE — 88312 SPECIAL STAINS GROUP 1: CPT | Performed by: PATHOLOGY

## 2021-06-10 RX ORDER — LIDOCAINE HYDROCHLORIDE 10 MG/ML
5 INJECTION, SOLUTION EPIDURAL; INFILTRATION; INTRACAUDAL; PERINEURAL ONCE
Status: COMPLETED | OUTPATIENT
Start: 2021-06-10 | End: 2021-06-10

## 2021-06-10 RX ADMIN — LIDOCAINE HYDROCHLORIDE 5 ML: 10 INJECTION, SOLUTION EPIDURAL; INFILTRATION; INTRACAUDAL; PERINEURAL at 15:21

## 2021-06-10 NOTE — PROGRESS NOTES
Ice pack given:    ___x__yes _____no    Discharge instructions signed by patient:    __x___yes _____no    Discharge instructions given to patient:    __x___yes _____no    Discharged via:    ___x__ambulatory    _____wheelchair    _____stretcher    Stable on discharge:    __x___yes ____no

## 2021-06-10 NOTE — PROGRESS NOTES
Procedure type:    __x___ultrasound guided _____stereotactic    Breast:    __x___Left _____Right    Location: 2 oclock 6 cmfn     Needle: 12g [place]    # of passes: 3    Clip: Ultraclip Wing    Performed by:Dr Kathryn Cool    Pressure held for 5 minutes by: Mindy Kimball Strips:    __x___yes _____no    Band aid:    __x___yes_____no    Tape and guaze:    _____yes __x___no    Tolerated procedure:    __x___yes _____no

## 2021-06-10 NOTE — DISCHARGE INSTR - OTHER ORDERS
POST LARGE CORE BREAST BIOPSY PATIENT INFORMATION      1  Place an ice pack inside your bra over the top of the dressing every hour for 20 minutes (20 minutes on, 60 minutes off)  Do this until bedtime  2  Do not shower or bathe until the following morning  3  You may bathe your breast carefully with the steri-strips in place  Be careful    Not to loosen them  The steri-strips will fall off in 3-5 days  4  You may have mild discomfort, and you may have some bruising where the   Needle entered the skin  This should clear within 5-7 days  5  If you need medicine for discomfort, take acetaminophen products such as   Tylenol  You may also take Advil or Motrin products  6  Do not participate in strenuous activities such as-tennis, aerobics, skiing,  Weight lifting, etc  for 24 hours  Refrain from swimming/soaking for 72 hours  7  Wearing a bra for sleeping may be more comfortable for the first 24-48 hours  8  Watch for continued bleeding, pain or fever over 101; please call with any questions or concerns  For procedures done at the [facility] "[facility] call:  Juan Sanchez RN at [facility] 81 RN at [phone]                    *After 4 PM call the Interventional Radiology Department                    [phone] and ask to speak with the nurse on call  For procedures done at the [address] [place] [address] call:         Ramone Lagunas RN at   *After 4 PM call the Interventional Radiology Department   [phone] and ask to speak with the nurse on call  For procedures done at [address] [place] [address] call: The Radiology Nurse at [phone]  *After 4 PM call your physician, or go to the Emergency Department  9          The final results of your biopsy are usually available within one week

## 2021-06-12 ENCOUNTER — IMMUNIZATIONS (OUTPATIENT)
Dept: FAMILY MEDICINE CLINIC | Facility: HOSPITAL | Age: 51
End: 2021-06-12

## 2021-06-12 DIAGNOSIS — Z23 ENCOUNTER FOR IMMUNIZATION: Primary | ICD-10-CM

## 2021-06-12 PROCEDURE — 0012A SARS-COV-2 / COVID-19 MRNA VACCINE (MODERNA) 100 MCG: CPT

## 2021-06-12 PROCEDURE — 91301 SARS-COV-2 / COVID-19 MRNA VACCINE (MODERNA) 100 MCG: CPT

## 2021-06-14 ENCOUNTER — TELEPHONE (OUTPATIENT)
Dept: MAMMOGRAPHY | Facility: CLINIC | Age: 51
End: 2021-06-14

## 2021-06-14 ENCOUNTER — TELEPHONE (OUTPATIENT)
Dept: GYNECOLOGY | Facility: CLINIC | Age: 51
End: 2021-06-14

## 2021-06-14 NOTE — TELEPHONE ENCOUNTER
LM on VM asking patient to have lab work done that was ordered on 5/26/21 that includes a CBC  Need to monitor anemia

## 2021-06-14 NOTE — TELEPHONE ENCOUNTER
I just wanted to let you know that the above patient was given her negative breast biopsy results  The patient had no questions and was advised to have a diagnostic mammogram and in 6 months  An appointment was made for  December 14, 2021 at 11:15    If you have any questions or need further assistance please let me know      Thank you,  JAYESH PetersonN, RN  Breast Nurse Navigator

## 2021-06-15 ENCOUNTER — LAB (OUTPATIENT)
Dept: LAB | Facility: MEDICAL CENTER | Age: 51
End: 2021-06-15
Payer: COMMERCIAL

## 2021-06-15 DIAGNOSIS — Z13.220 LIPID SCREENING: ICD-10-CM

## 2021-06-15 DIAGNOSIS — Z11.4 SCREENING FOR HIV (HUMAN IMMUNODEFICIENCY VIRUS): ICD-10-CM

## 2021-06-15 DIAGNOSIS — E66.9 CLASS 2 OBESITY WITH BODY MASS INDEX (BMI) OF 36.0 TO 36.9 IN ADULT, UNSPECIFIED OBESITY TYPE, UNSPECIFIED WHETHER SERIOUS COMORBIDITY PRESENT: ICD-10-CM

## 2021-06-15 DIAGNOSIS — I10 ESSENTIAL HYPERTENSION: ICD-10-CM

## 2021-06-15 DIAGNOSIS — D64.9 ANEMIA, UNSPECIFIED TYPE: ICD-10-CM

## 2021-06-15 LAB
ALBUMIN SERPL BCP-MCNC: 3.4 G/DL (ref 3.5–5)
ALP SERPL-CCNC: 62 U/L (ref 46–116)
ALT SERPL W P-5'-P-CCNC: 20 U/L (ref 12–78)
ANION GAP SERPL CALCULATED.3IONS-SCNC: 6 MMOL/L (ref 4–13)
AST SERPL W P-5'-P-CCNC: 17 U/L (ref 5–45)
BASOPHILS # BLD AUTO: 0.01 THOUSANDS/ΜL (ref 0–0.1)
BASOPHILS NFR BLD AUTO: 0 % (ref 0–1)
BILIRUB SERPL-MCNC: 0.33 MG/DL (ref 0.2–1)
BILIRUB UR QL STRIP: NEGATIVE
BUN SERPL-MCNC: 13 MG/DL (ref 5–25)
CALCIUM ALBUM COR SERPL-MCNC: 10.5 MG/DL (ref 8.3–10.1)
CALCIUM SERPL-MCNC: 10 MG/DL (ref 8.3–10.1)
CHLORIDE SERPL-SCNC: 106 MMOL/L (ref 100–108)
CHOLEST SERPL-MCNC: 201 MG/DL (ref 50–200)
CLARITY UR: CLEAR
CO2 SERPL-SCNC: 25 MMOL/L (ref 21–32)
COLOR UR: YELLOW
CREAT SERPL-MCNC: 0.9 MG/DL (ref 0.6–1.3)
EOSINOPHIL # BLD AUTO: 0.17 THOUSAND/ΜL (ref 0–0.61)
EOSINOPHIL NFR BLD AUTO: 3 % (ref 0–6)
ERYTHROCYTE [DISTWIDTH] IN BLOOD BY AUTOMATED COUNT: 21.8 % (ref 11.6–15.1)
EST. AVERAGE GLUCOSE BLD GHB EST-MCNC: 103 MG/DL
GFR SERPL CREATININE-BSD FRML MDRD: 86 ML/MIN/1.73SQ M
GLUCOSE P FAST SERPL-MCNC: 80 MG/DL (ref 65–99)
GLUCOSE UR STRIP-MCNC: NEGATIVE MG/DL
HBA1C MFR BLD: 5.2 %
HCT VFR BLD AUTO: 41.2 % (ref 34.8–46.1)
HDLC SERPL-MCNC: 48 MG/DL
HGB BLD-MCNC: 13.3 G/DL (ref 11.5–15.4)
HGB UR QL STRIP.AUTO: NEGATIVE
IMM GRANULOCYTES # BLD AUTO: 0.02 THOUSAND/UL (ref 0–0.2)
IMM GRANULOCYTES NFR BLD AUTO: 0 % (ref 0–2)
KETONES UR STRIP-MCNC: NEGATIVE MG/DL
LDLC SERPL CALC-MCNC: 137 MG/DL (ref 0–100)
LEUKOCYTE ESTERASE UR QL STRIP: NEGATIVE
LYMPHOCYTES # BLD AUTO: 2.4 THOUSANDS/ΜL (ref 0.6–4.47)
LYMPHOCYTES NFR BLD AUTO: 43 % (ref 14–44)
MCH RBC QN AUTO: 29.4 PG (ref 26.8–34.3)
MCHC RBC AUTO-ENTMCNC: 32.3 G/DL (ref 31.4–37.4)
MCV RBC AUTO: 91 FL (ref 82–98)
MONOCYTES # BLD AUTO: 0.54 THOUSAND/ΜL (ref 0.17–1.22)
MONOCYTES NFR BLD AUTO: 10 % (ref 4–12)
NEUTROPHILS # BLD AUTO: 2.39 THOUSANDS/ΜL (ref 1.85–7.62)
NEUTS SEG NFR BLD AUTO: 44 % (ref 43–75)
NITRITE UR QL STRIP: NEGATIVE
NRBC BLD AUTO-RTO: 0 /100 WBCS
PH UR STRIP.AUTO: 6 [PH]
PLATELET # BLD AUTO: 318 THOUSANDS/UL (ref 149–390)
PMV BLD AUTO: 10.7 FL (ref 8.9–12.7)
POTASSIUM SERPL-SCNC: 4 MMOL/L (ref 3.5–5.3)
PROT SERPL-MCNC: 8 G/DL (ref 6.4–8.2)
PROT UR STRIP-MCNC: NEGATIVE MG/DL
RBC # BLD AUTO: 4.52 MILLION/UL (ref 3.81–5.12)
SODIUM SERPL-SCNC: 137 MMOL/L (ref 136–145)
SP GR UR STRIP.AUTO: 1.01 (ref 1–1.03)
TRIGL SERPL-MCNC: 78 MG/DL
TSH SERPL DL<=0.05 MIU/L-ACNC: 1.01 UIU/ML (ref 0.36–3.74)
UROBILINOGEN UR QL STRIP.AUTO: 0.2 E.U./DL
WBC # BLD AUTO: 5.53 THOUSAND/UL (ref 4.31–10.16)

## 2021-06-15 PROCEDURE — 84443 ASSAY THYROID STIM HORMONE: CPT

## 2021-06-15 PROCEDURE — 36415 COLL VENOUS BLD VENIPUNCTURE: CPT

## 2021-06-15 PROCEDURE — 83036 HEMOGLOBIN GLYCOSYLATED A1C: CPT

## 2021-06-15 PROCEDURE — 80053 COMPREHEN METABOLIC PANEL: CPT

## 2021-06-15 PROCEDURE — 85025 COMPLETE CBC W/AUTO DIFF WBC: CPT

## 2021-06-15 PROCEDURE — 80061 LIPID PANEL: CPT

## 2021-06-15 PROCEDURE — 81003 URINALYSIS AUTO W/O SCOPE: CPT | Performed by: INTERNAL MEDICINE

## 2021-06-25 ENCOUNTER — HOSPITAL ENCOUNTER (OUTPATIENT)
Dept: GASTROENTEROLOGY | Facility: MEDICAL CENTER | Age: 51
Setting detail: OUTPATIENT SURGERY
Discharge: HOME | End: 2021-06-25
Attending: INTERNAL MEDICINE

## 2021-07-16 DIAGNOSIS — I10 ESSENTIAL HYPERTENSION: ICD-10-CM

## 2021-07-16 RX ORDER — HYDROCHLOROTHIAZIDE 25 MG/1
TABLET ORAL
Qty: 90 TABLET | Refills: 1 | Status: SHIPPED | OUTPATIENT
Start: 2021-07-16 | End: 2021-08-30 | Stop reason: SDUPTHER

## 2021-08-23 ENCOUNTER — PREP FOR PROCEDURE (OUTPATIENT)
Dept: GASTROENTEROLOGY | Facility: CLINIC | Age: 51
End: 2021-08-23

## 2021-08-23 ENCOUNTER — TELEPHONE (OUTPATIENT)
Dept: GASTROENTEROLOGY | Facility: CLINIC | Age: 51
End: 2021-08-23

## 2021-08-23 DIAGNOSIS — Z12.11 COLON CANCER SCREENING: Primary | ICD-10-CM

## 2021-08-23 NOTE — TELEPHONE ENCOUNTER
Colon scheduled on 11/19 at [place] with Jo 19  I gave pt verbal instructions/mailed   Prep-Miralax/Dulcolax

## 2021-08-30 ENCOUNTER — OFFICE VISIT (OUTPATIENT)
Dept: FAMILY MEDICINE CLINIC | Facility: CLINIC | Age: 51
End: 2021-08-30
Payer: COMMERCIAL

## 2021-08-30 VITALS
WEIGHT: 208 LBS | TEMPERATURE: 97.8 F | OXYGEN SATURATION: 100 % | HEART RATE: 102 BPM | DIASTOLIC BLOOD PRESSURE: 102 MMHG | HEIGHT: 62 IN | SYSTOLIC BLOOD PRESSURE: 150 MMHG | BODY MASS INDEX: 38.28 KG/M2 | RESPIRATION RATE: 12 BRPM

## 2021-08-30 DIAGNOSIS — I10 ESSENTIAL HYPERTENSION: ICD-10-CM

## 2021-08-30 DIAGNOSIS — E66.9 CLASS 2 OBESITY WITH BODY MASS INDEX (BMI) OF 36.0 TO 36.9 IN ADULT, UNSPECIFIED OBESITY TYPE, UNSPECIFIED WHETHER SERIOUS COMORBIDITY PRESENT: Primary | ICD-10-CM

## 2021-08-30 PROCEDURE — 99213 OFFICE O/P EST LOW 20 MIN: CPT | Performed by: INTERNAL MEDICINE

## 2021-08-30 PROCEDURE — 1036F TOBACCO NON-USER: CPT | Performed by: INTERNAL MEDICINE

## 2021-08-30 PROCEDURE — 3077F SYST BP >= 140 MM HG: CPT | Performed by: INTERNAL MEDICINE

## 2021-08-30 PROCEDURE — 3008F BODY MASS INDEX DOCD: CPT | Performed by: INTERNAL MEDICINE

## 2021-08-30 PROCEDURE — 3080F DIAST BP >= 90 MM HG: CPT | Performed by: INTERNAL MEDICINE

## 2021-08-30 RX ORDER — HYDROCHLOROTHIAZIDE 25 MG/1
25 TABLET ORAL DAILY
Qty: 90 TABLET | Refills: 1 | Status: SHIPPED | OUTPATIENT
Start: 2021-08-30 | End: 2022-02-28 | Stop reason: SDUPTHER

## 2021-08-30 RX ORDER — LISINOPRIL 20 MG/1
20 TABLET ORAL DAILY
Qty: 90 TABLET | Refills: 1 | Status: SHIPPED | OUTPATIENT
Start: 2021-08-30 | End: 2021-11-22 | Stop reason: ALTCHOICE

## 2021-08-30 NOTE — PATIENT INSTRUCTIONS
Please restart your medications for the blood pressure, take lisinopril at night and hydrochlorothiazide in the morning  Check your blood pressures once or twice a day, write down your numbers, bring your blood pressure cuff in 3 weeks for the appointment  please do not add any salt to your diet  Please do exercise, fast walking 50 minutes, you have to sweat, heart rate should be around 150

## 2021-09-01 NOTE — PROGRESS NOTES
Assessment/Plan:    No problem-specific Assessment & Plan notes found for this encounter  Diagnoses and all orders for this visit:    Class 2 obesity with body mass index (BMI) of 36 0 to 36 9 in adult, unspecified obesity type, unspecified whether serious comorbidity present  -     Ambulatory referral to Weight Management; Future    Essential hypertension  Comments:  Blood pressure is 160/100 today  Will restart lisinopril 20 mg and hyddaily  Patient will check her blood pressures for 7 days and she will follow-up with us  Orders:  -     lisinopril (ZESTRIL) 20 mg tablet; Take 1 tablet (20 mg total) by mouth daily  -     hydrochlorothiazide (HYDRODIURIL) 25 mg tablet; Take 1 tablet (25 mg total) by mouth daily  -     Basic metabolic panel; Future    Essential hypertension  -     lisinopril (ZESTRIL) 20 mg tablet; Take 1 tablet (20 mg total) by mouth daily  -     hydrochlorothiazide (HYDRODIURIL) 25 mg tablet; Take 1 tablet (25 mg total) by mouth daily  -     Basic metabolic panel; Future      will recheck BMP in few weeks  Subjective:      Patient ID: Abdirashid Gonzalez is a 45 y o  female  Patient came today for follow-up, her blood pressure was elevated today but she said that she was not taking her medications because of the insurance covers  She right now obtained medical insurance and wish to restart her medications  She does not report any active complaints  She also trying to lose weight, she was trying to hold healthy diet and exercise but her weight remains stable  She would like to speak with the weight        The following portions of the patient's history were reviewed and updated as appropriate: allergies, current medications, past family history, past medical history, past social history, past surgical history, and problem list     Review of Systems   Constitutional: Positive for fatigue  Negative for activity change, appetite change, chills and fever     HENT: Negative for congestion, ear pain, rhinorrhea and sore throat  Respiratory: Negative for cough, shortness of breath and wheezing  Cardiovascular: Negative for chest pain, palpitations and leg swelling  Gastrointestinal: Negative for abdominal distention, abdominal pain, diarrhea, nausea and vomiting  Genitourinary: Negative for difficulty urinating, frequency and pelvic pain  Musculoskeletal: Negative for arthralgias, back pain and neck pain  Skin: Negative for rash  Neurological: Negative for dizziness, tremors, weakness, numbness and headaches  Objective:      BP (!) 150/102 (BP Location: Left arm, Patient Position: Sitting, Cuff Size: Standard)   Pulse 102   Temp 97 8 °F (36 6 °C)   Resp 12   Ht 5' 2" (1 575 m)   Wt 94 3 kg (208 lb)   SpO2 100%   BMI 38 04 kg/m²          Physical Exam  Vitals reviewed  Constitutional:       General: She is not in acute distress  Appearance: She is well-developed  She is not diaphoretic  HENT:      Head: Normocephalic and atraumatic  Eyes:      General:         Right eye: No discharge  Left eye: No discharge  Cardiovascular:      Rate and Rhythm: Normal rate and regular rhythm  Pulses: Normal pulses  Pulmonary:      Effort: Pulmonary effort is normal  No respiratory distress  Breath sounds: Normal breath sounds  No wheezing  Abdominal:      Palpations: Abdomen is soft  Musculoskeletal:         General: Normal range of motion  Cervical back: Normal range of motion and neck supple  Lymphadenopathy:      Cervical: No cervical adenopathy  Skin:     General: Skin is warm and dry  Neurological:      Mental Status: She is alert and oriented to person, place, and time  Cranial Nerves: No cranial nerve deficit  Psychiatric:         Speech: Speech normal          Behavior: Behavior normal          Thought Content:  Thought content normal          Judgment: Judgment normal                Current Outpatient Medications:     Blood Pressure Monitoring (Blood Pressure Cuff) MISC, Use 2 (two) times a day Omron, Upper arm, large cuff, Disp: 1 each, Rfl: 0    hydrochlorothiazide (HYDRODIURIL) 25 mg tablet, Take 1 tablet (25 mg total) by mouth daily, Disp: 90 tablet, Rfl: 1    lisinopril (ZESTRIL) 20 mg tablet, Take 1 tablet (20 mg total) by mouth daily, Disp: 90 tablet, Rfl: 1

## 2021-09-07 ENCOUNTER — CONSULT (OUTPATIENT)
Dept: GYNECOLOGY | Facility: CLINIC | Age: 51
End: 2021-09-07
Payer: COMMERCIAL

## 2021-09-07 VITALS
HEART RATE: 125 BPM | SYSTOLIC BLOOD PRESSURE: 158 MMHG | WEIGHT: 204 LBS | HEIGHT: 62 IN | DIASTOLIC BLOOD PRESSURE: 102 MMHG | BODY MASS INDEX: 37.54 KG/M2

## 2021-09-07 DIAGNOSIS — Z01.812 PRE-OPERATIVE LABORATORY EXAMINATION: ICD-10-CM

## 2021-09-07 DIAGNOSIS — R10.2 PELVIC PAIN: ICD-10-CM

## 2021-09-07 DIAGNOSIS — Z01.810 PRE-OPERATIVE CARDIOVASCULAR EXAMINATION: Primary | ICD-10-CM

## 2021-09-07 DIAGNOSIS — N92.0 MENORRHAGIA WITH REGULAR CYCLE: ICD-10-CM

## 2021-09-07 DIAGNOSIS — D21.9 FIBROIDS: ICD-10-CM

## 2021-09-07 PROCEDURE — 99215 OFFICE O/P EST HI 40 MIN: CPT | Performed by: OBSTETRICS & GYNECOLOGY

## 2021-09-07 PROCEDURE — 3080F DIAST BP >= 90 MM HG: CPT | Performed by: OBSTETRICS & GYNECOLOGY

## 2021-09-07 PROCEDURE — 3008F BODY MASS INDEX DOCD: CPT | Performed by: OBSTETRICS & GYNECOLOGY

## 2021-09-07 PROCEDURE — 1036F TOBACCO NON-USER: CPT | Performed by: OBSTETRICS & GYNECOLOGY

## 2021-09-07 PROCEDURE — 3077F SYST BP >= 140 MM HG: CPT | Performed by: OBSTETRICS & GYNECOLOGY

## 2021-09-07 NOTE — PROGRESS NOTES
Assessment/Plan:    Discussed symptomatic fibroid uterus and options including following versus medical management versus surgical management  At this point patient desires a hysterectomy  Discussed LSH BS versus TLH BS  The patient is opted to proceed with an [facility] BS  We discussed the risks of the surgery including, but not limited to, infection, hemorrhage, bowel bladder or vascular injury, possible necessity for laparotomy  Diagnoses and all orders for this visit:    Fibroids    Menorrhagia with regular cycle    Pelvic pain        Subjective:      Patient ID: Esther Gonzalez is a 45 y o  female  HPI  G 5 P 5005, C section x 1 FD,  x 4; with symptomatic fibroid  patient presented 21 consult regarding fibroids  She reports normal regular menses up until 3 months prior to that visit  Menses then became heavy and prolonged  She states she has been told she has a fibroid uterus,   She reports history of urinary frequency, urgency, without UUI  Most bothersome at night when she gets up 5-6 times at night  She drinks coffee and tea all day and up until bed time  She denies VM sx, pelvic pain, dyspareunia, breast concerns, abnormal discharge, bowel dysfunction, depression/anx  , sexually active and is monogamous  Denies STI concerns   No hx of STIs  She has not used [facility] with current partner for 4 years without pregnancy    Ultrasound at that time revealed multiple fibroids:    AMB US Pelvic Non OB   Date/Time: 2021 8:15 AM  Performed by: Yuriy Gonzalez  Authorized by: Bertram Mata DO   Universal Protocol:  Patient identity confirmed: verbally with patient        Procedure details:     Technique:  Transvaginal US, Non-OB    Position: lithotomy exam    Uterine findings:     Length (cm): 16 99    Height (cm):  8 89    Width (cm):  15 3  Left ovary findings:     Left ovary:  Unable to visualize  Right ovary findings:     Right ovary:  Unable to visualize  Other findings:     Free pelvic fluid: not identified      Free peritoneal fluid: not identified    Post-Procedure Details:     Impression:  Enlarged anteverted uterus demonstrates multiple large fibroids  Largest are left subserosal 2 6cm, left fundal intramural 9 1cm, right intramural 5 8cm, anterior intramural 2 9cm, and anterior mid lower uterine segment 5 1cm  The uterine measurement is as close as possible as the whole uterus is larger than the ultrasound field of view  The endometrium is not identified secondary to the fibroids  The ovaries are not visualized transvaginally or transabdominally due to shadowing from the fibroids  No free fluid     Tolerance: Tolerated well, no immediate complications    Complications: no complications    Additional Procedure Comments:      Ooolala P5 transvaginal transducer E8C with Serial Number [id number] was used during procedure and subsequently cleaned with high level disinfection utilizing the [email or web address]       Ultrasound performed at:   [address]  [address] [facility]  [address] [facility], [address] [facility]  Phone: [phone]  Fax:  [phone]     Endometrial biopsy   Date/Time: 4/7/2021 8:16 AM  Performed by: Felicia Arana  Authorized by: DO Roman Rios Protocol:  Patient understanding: patient states understanding of the procedure being performed  Patient identity confirmed: verbally with patient        Indication:     Indications:  Other disorder of menstruation and other abnormal bleeding from female genital tract    Procedure:     Procedure: endometrial biopsy with Pipelle      A bivalve speculum was placed in the vagina: yes      Cervix cleaned and prepped: yes      Specimen collected: specimen collected and sent to pathology  : Benign result    Patient tolerated procedure well with no complications: yes    Sonohysterogram   Date/Time: 4/7/2021 8:16 AM  Performed by: Felicia Arana  Authorized by: Charlene Jennings Patriarco, DO   Universal Protocol:  Patient identity confirmed: verbally with patient        Pre-procedure:     Prepped with: Betadine    Procedure:     Cervix cleaned and prepped: yes      Catheter inserted: yes      Uterine cavity distended with saline: yes    Post-procedure:     Patient observed: yes      Post procedure instructions given to patient: yes      Patient tolerated procedure well with no complications: yes    Comments:      Sonohysterogram demonstrates a 4 1 submucosal fibroid  Hemoglobin in March was 7 4  Her most recent hemoglobin status post iron infusions was 13 3 in   Since her visit in March her menses are regular and not near as heavy as they were prior to that visit  The following portions of the patient's history were reviewed and updated as appropriate:   She  has a past medical history of Hypertension and Lumbar radiculopathy (2016)  She   Patient Active Problem List    Diagnosis Date Noted    Aspirin allergy 2021    Anemia 2021    Iron deficiency anemia due to chronic blood loss 2021    Essential hypertension 2020    Plantar fasciitis 2020    Left sided sciatica 2020    Flat foot 2016    Deformity of foot, equinus 2016    Obesity 2013    Leiomyoma of uterus 2013    Asymptomatic varicose veins 2013    Sickle cell trait ([facility] [address] ) 2013     She  has a past surgical history that includes  section; Reduction mammaplasty (Bilateral, ); Breast biopsy (Left, 06/10/2021); and US guided breast biopsy left complete (Left, 6/10/2021)  Her family history includes Diabetes in her father; Hypertension in her mother; No Known Problems in her daughter, daughter, daughter, maternal grandfather, maternal grandmother, paternal aunt, paternal grandfather, paternal grandmother, sister, sister, sister, sister, sister, son, and son  She  reports that she has never smoked   She has never used smokeless tobacco  She reports previous alcohol use  She reports that she does not use drugs  Current Outpatient Medications   Medication Sig Dispense Refill    Blood Pressure Monitoring (Blood Pressure Cuff) MISC Use 2 (two) times a day Omron, Upper arm, large cuff 1 each 0    hydrochlorothiazide (HYDRODIURIL) 25 mg tablet Take 1 tablet (25 mg total) by mouth daily 90 tablet 1    lisinopril (ZESTRIL) 20 mg tablet Take 1 tablet (20 mg total) by mouth daily 90 tablet 1     No current facility-administered medications for this visit  Current Outpatient Medications on File Prior to Visit   Medication Sig    Blood Pressure Monitoring (Blood Pressure Cuff) MISC Use 2 (two) times a day Omron, Upper arm, large cuff    hydrochlorothiazide (HYDRODIURIL) 25 mg tablet Take 1 tablet (25 mg total) by mouth daily    lisinopril (ZESTRIL) 20 mg tablet Take 1 tablet (20 mg total) by mouth daily     No current facility-administered medications on file prior to visit  She is allergic to aspirin, calcium carbonate, ibuprofen, and penicillins       Review of Systems   Constitutional: Negative  Gastrointestinal: Positive for abdominal distention  Abdominal pressure   Genitourinary: Positive for frequency and pelvic pain  Negative for difficulty urinating, dyspareunia, dysuria and menstrual problem  Objective:      BP (!) 158/102 (BP Location: Right arm, Patient Position: Standing, Cuff Size: Large)   Pulse (!) 125   Ht 5' 2" (1 575 m)   Wt 92 5 kg (204 lb)   BMI 37 31 kg/m²          Physical Exam  Vitals reviewed  Constitutional:       Appearance: Normal appearance  Cardiovascular:      Rate and Rhythm: Normal rate and regular rhythm  Pulses: Normal pulses  Heart sounds: Normal heart sounds  Pulmonary:      Effort: Pulmonary effort is normal       Breath sounds: Normal breath sounds  Abdominal:      Palpations: Abdomen is soft  There is mass (Fundus to 18 cm)  Tenderness:  There is no abdominal tenderness  Hernia: There is no hernia in the left inguinal area or right inguinal area  Genitourinary:     General: Normal vulva  Labia:         Right: No rash, tenderness or lesion  Left: No rash, tenderness or lesion  Vagina: Normal       Cervix: Normal       Uterus: Enlarged (18 week size, irregular contour)  Lymphadenopathy:      Lower Body: No right inguinal adenopathy  No left inguinal adenopathy  Neurological:      Mental Status: She is alert

## 2021-11-11 ENCOUNTER — TELEPHONE (OUTPATIENT)
Dept: GASTROENTEROLOGY | Facility: CLINIC | Age: 51
End: 2021-11-11

## 2021-11-18 ENCOUNTER — TELEPHONE (OUTPATIENT)
Dept: GASTROENTEROLOGY | Facility: MEDICAL CENTER | Age: 51
End: 2021-11-18

## 2021-11-18 ENCOUNTER — ANESTHESIA (OUTPATIENT)
Dept: ANESTHESIOLOGY | Facility: HOSPITAL | Age: 51
End: 2021-11-18

## 2021-11-18 ENCOUNTER — ANESTHESIA EVENT (OUTPATIENT)
Dept: ANESTHESIOLOGY | Facility: HOSPITAL | Age: 51
End: 2021-11-18

## 2021-11-19 ENCOUNTER — ANESTHESIA EVENT (OUTPATIENT)
Dept: GASTROENTEROLOGY | Facility: MEDICAL CENTER | Age: 51
End: 2021-11-19

## 2021-11-19 ENCOUNTER — ANESTHESIA (OUTPATIENT)
Dept: GASTROENTEROLOGY | Facility: MEDICAL CENTER | Age: 51
End: 2021-11-19

## 2021-11-19 ENCOUNTER — APPOINTMENT (OUTPATIENT)
Dept: LAB | Facility: MEDICAL CENTER | Age: 51
End: 2021-11-19
Payer: COMMERCIAL

## 2021-11-19 ENCOUNTER — CLINICAL SUPPORT (OUTPATIENT)
Dept: URGENT CARE | Facility: MEDICAL CENTER | Age: 51
End: 2021-11-19
Payer: COMMERCIAL

## 2021-11-19 ENCOUNTER — HOSPITAL ENCOUNTER (OUTPATIENT)
Dept: GASTROENTEROLOGY | Facility: MEDICAL CENTER | Age: 51
Setting detail: OUTPATIENT SURGERY
Discharge: HOME | End: 2021-11-19
Attending: INTERNAL MEDICINE | Admitting: INTERNAL MEDICINE
Payer: COMMERCIAL

## 2021-11-19 VITALS
TEMPERATURE: 97.1 F | BODY MASS INDEX: 34.91 KG/M2 | SYSTOLIC BLOOD PRESSURE: 176 MMHG | WEIGHT: 197 LBS | RESPIRATION RATE: 20 BRPM | DIASTOLIC BLOOD PRESSURE: 99 MMHG | HEART RATE: 93 BPM | OXYGEN SATURATION: 100 % | HEIGHT: 63 IN

## 2021-11-19 DIAGNOSIS — Z12.11 COLON CANCER SCREENING: ICD-10-CM

## 2021-11-19 DIAGNOSIS — R10.2 PELVIC PAIN: ICD-10-CM

## 2021-11-19 DIAGNOSIS — Z01.812 PRE-OPERATIVE LABORATORY EXAMINATION: ICD-10-CM

## 2021-11-19 DIAGNOSIS — N92.0 MENORRHAGIA WITH REGULAR CYCLE: ICD-10-CM

## 2021-11-19 DIAGNOSIS — Z01.810 PRE-OPERATIVE CARDIOVASCULAR EXAMINATION: ICD-10-CM

## 2021-11-19 DIAGNOSIS — D21.9 FIBROIDS: ICD-10-CM

## 2021-11-19 DIAGNOSIS — I10 ESSENTIAL HYPERTENSION: ICD-10-CM

## 2021-11-19 LAB
ABO GROUP BLD: NORMAL
ANION GAP SERPL CALCULATED.3IONS-SCNC: 8 MMOL/L (ref 4–13)
ATRIAL RATE: 90 BPM
BLD GP AB SCN SERPL QL: NEGATIVE
BUN SERPL-MCNC: 8 MG/DL (ref 5–25)
CALCIUM SERPL-MCNC: 9.8 MG/DL (ref 8.3–10.1)
CHLORIDE SERPL-SCNC: 106 MMOL/L (ref 100–108)
CO2 SERPL-SCNC: 22 MMOL/L (ref 21–32)
CREAT SERPL-MCNC: 0.82 MG/DL (ref 0.6–1.3)
GFR SERPL CREATININE-BSD FRML MDRD: 96 ML/MIN/1.73SQ M
GLUCOSE SERPL-MCNC: 67 MG/DL (ref 65–140)
P AXIS: 73 DEGREES
POTASSIUM SERPL-SCNC: 4.1 MMOL/L (ref 3.5–5.3)
PR INTERVAL: 158 MS
QRS AXIS: -1 DEGREES
QRSD INTERVAL: 74 MS
QT INTERVAL: 364 MS
QTC INTERVAL: 445 MS
RH BLD: POSITIVE
SODIUM SERPL-SCNC: 136 MMOL/L (ref 136–145)
SPECIMEN EXPIRATION DATE: NORMAL
T WAVE AXIS: 42 DEGREES
VENTRICULAR RATE: 90 BPM

## 2021-11-19 PROCEDURE — 86900 BLOOD TYPING SEROLOGIC ABO: CPT

## 2021-11-19 PROCEDURE — 36415 COLL VENOUS BLD VENIPUNCTURE: CPT

## 2021-11-19 PROCEDURE — 93010 ELECTROCARDIOGRAM REPORT: CPT | Performed by: INTERNAL MEDICINE

## 2021-11-19 PROCEDURE — 93005 ELECTROCARDIOGRAM TRACING: CPT

## 2021-11-19 PROCEDURE — 86901 BLOOD TYPING SEROLOGIC RH(D): CPT

## 2021-11-19 PROCEDURE — 86850 RBC ANTIBODY SCREEN: CPT

## 2021-11-19 PROCEDURE — 80048 BASIC METABOLIC PNL TOTAL CA: CPT

## 2021-11-19 RX ORDER — SODIUM CHLORIDE 9 MG/ML
125 INJECTION, SOLUTION INTRAVENOUS CONTINUOUS
Status: DISCONTINUED | OUTPATIENT
Start: 2021-11-19 | End: 2021-11-23 | Stop reason: HOSPADM

## 2021-11-19 RX ADMIN — SODIUM CHLORIDE 125 ML/HR: 0.9 INJECTION, SOLUTION INTRAVENOUS at 13:40

## 2021-11-22 ENCOUNTER — OFFICE VISIT (OUTPATIENT)
Dept: FAMILY MEDICINE CLINIC | Facility: CLINIC | Age: 51
End: 2021-11-22
Payer: COMMERCIAL

## 2021-11-22 VITALS
DIASTOLIC BLOOD PRESSURE: 90 MMHG | WEIGHT: 209.6 LBS | SYSTOLIC BLOOD PRESSURE: 150 MMHG | HEIGHT: 63 IN | HEART RATE: 108 BPM | OXYGEN SATURATION: 100 % | RESPIRATION RATE: 12 BRPM | BODY MASS INDEX: 37.14 KG/M2

## 2021-11-22 DIAGNOSIS — I10 ESSENTIAL HYPERTENSION: Primary | ICD-10-CM

## 2021-11-22 PROCEDURE — 99213 OFFICE O/P EST LOW 20 MIN: CPT | Performed by: INTERNAL MEDICINE

## 2021-11-22 PROCEDURE — 3008F BODY MASS INDEX DOCD: CPT | Performed by: INTERNAL MEDICINE

## 2021-11-22 PROCEDURE — 3077F SYST BP >= 140 MM HG: CPT | Performed by: INTERNAL MEDICINE

## 2021-11-22 PROCEDURE — 3080F DIAST BP >= 90 MM HG: CPT | Performed by: INTERNAL MEDICINE

## 2021-11-22 PROCEDURE — 1036F TOBACCO NON-USER: CPT | Performed by: INTERNAL MEDICINE

## 2021-11-22 RX ORDER — LISINOPRIL 40 MG/1
40 TABLET ORAL DAILY
Qty: 90 TABLET | Refills: 0 | Status: SHIPPED | OUTPATIENT
Start: 2021-11-22 | End: 2022-02-14

## 2021-11-22 RX ORDER — AMLODIPINE BESYLATE 5 MG/1
5 TABLET ORAL DAILY
Qty: 30 TABLET | Refills: 0 | Status: SHIPPED | OUTPATIENT
Start: 2021-11-22 | End: 2021-12-15

## 2021-11-29 ENCOUNTER — ANESTHESIA EVENT (OUTPATIENT)
Dept: PERIOP | Facility: HOSPITAL | Age: 51
End: 2021-11-29
Payer: COMMERCIAL

## 2021-11-29 PROCEDURE — NC001 PR NO CHARGE: Performed by: OBSTETRICS & GYNECOLOGY

## 2021-12-01 ENCOUNTER — TELEPHONE (OUTPATIENT)
Dept: FAMILY MEDICINE CLINIC | Facility: CLINIC | Age: 51
End: 2021-12-01

## 2021-12-02 ENCOUNTER — OFFICE VISIT (OUTPATIENT)
Dept: FAMILY MEDICINE CLINIC | Facility: CLINIC | Age: 51
End: 2021-12-02
Payer: COMMERCIAL

## 2021-12-02 VITALS
BODY MASS INDEX: 36.71 KG/M2 | DIASTOLIC BLOOD PRESSURE: 100 MMHG | SYSTOLIC BLOOD PRESSURE: 160 MMHG | RESPIRATION RATE: 12 BRPM | HEIGHT: 63 IN | WEIGHT: 207.2 LBS | HEART RATE: 103 BPM | OXYGEN SATURATION: 100 %

## 2021-12-02 DIAGNOSIS — E78.2 MIXED HYPERLIPIDEMIA: ICD-10-CM

## 2021-12-02 DIAGNOSIS — I10 ESSENTIAL HYPERTENSION: ICD-10-CM

## 2021-12-02 DIAGNOSIS — D25.9 UTERINE LEIOMYOMA, UNSPECIFIED LOCATION: ICD-10-CM

## 2021-12-02 DIAGNOSIS — Z01.818 PREOP EXAMINATION: Primary | ICD-10-CM

## 2021-12-02 DIAGNOSIS — D64.9 ANEMIA, UNSPECIFIED TYPE: ICD-10-CM

## 2021-12-02 PROCEDURE — 3008F BODY MASS INDEX DOCD: CPT | Performed by: INTERNAL MEDICINE

## 2021-12-02 PROCEDURE — 1036F TOBACCO NON-USER: CPT | Performed by: INTERNAL MEDICINE

## 2021-12-02 PROCEDURE — 99214 OFFICE O/P EST MOD 30 MIN: CPT | Performed by: INTERNAL MEDICINE

## 2021-12-03 ENCOUNTER — APPOINTMENT (OUTPATIENT)
Dept: LAB | Facility: MEDICAL CENTER | Age: 51
End: 2021-12-03
Payer: COMMERCIAL

## 2021-12-03 DIAGNOSIS — E78.2 MIXED HYPERLIPIDEMIA: ICD-10-CM

## 2021-12-03 DIAGNOSIS — D64.9 ANEMIA, UNSPECIFIED TYPE: ICD-10-CM

## 2021-12-03 LAB
BASOPHILS # BLD AUTO: 0.03 THOUSANDS/ΜL (ref 0–0.1)
BASOPHILS NFR BLD AUTO: 0 % (ref 0–1)
CHOLEST SERPL-MCNC: 207 MG/DL
EOSINOPHIL # BLD AUTO: 0.21 THOUSAND/ΜL (ref 0–0.61)
EOSINOPHIL NFR BLD AUTO: 3 % (ref 0–6)
ERYTHROCYTE [DISTWIDTH] IN BLOOD BY AUTOMATED COUNT: 16.3 % (ref 11.6–15.1)
HCT VFR BLD AUTO: 39.2 % (ref 34.8–46.1)
HDLC SERPL-MCNC: 57 MG/DL
HGB BLD-MCNC: 12.3 G/DL (ref 11.5–15.4)
IMM GRANULOCYTES # BLD AUTO: 0.02 THOUSAND/UL (ref 0–0.2)
IMM GRANULOCYTES NFR BLD AUTO: 0 % (ref 0–2)
LDLC SERPL CALC-MCNC: 135 MG/DL (ref 0–100)
LYMPHOCYTES # BLD AUTO: 2.6 THOUSANDS/ΜL (ref 0.6–4.47)
LYMPHOCYTES NFR BLD AUTO: 38 % (ref 14–44)
MCH RBC QN AUTO: 28.5 PG (ref 26.8–34.3)
MCHC RBC AUTO-ENTMCNC: 31.4 G/DL (ref 31.4–37.4)
MCV RBC AUTO: 91 FL (ref 82–98)
MONOCYTES # BLD AUTO: 0.6 THOUSAND/ΜL (ref 0.17–1.22)
MONOCYTES NFR BLD AUTO: 9 % (ref 4–12)
NEUTROPHILS # BLD AUTO: 3.41 THOUSANDS/ΜL (ref 1.85–7.62)
NEUTS SEG NFR BLD AUTO: 50 % (ref 43–75)
NONHDLC SERPL-MCNC: 150 MG/DL
NRBC BLD AUTO-RTO: 0 /100 WBCS
PLATELET # BLD AUTO: 427 THOUSANDS/UL (ref 149–390)
PMV BLD AUTO: 10.8 FL (ref 8.9–12.7)
RBC # BLD AUTO: 4.31 MILLION/UL (ref 3.81–5.12)
TRIGL SERPL-MCNC: 76 MG/DL
WBC # BLD AUTO: 6.87 THOUSAND/UL (ref 4.31–10.16)

## 2021-12-03 PROCEDURE — 36415 COLL VENOUS BLD VENIPUNCTURE: CPT

## 2021-12-03 PROCEDURE — 85025 COMPLETE CBC W/AUTO DIFF WBC: CPT

## 2021-12-03 PROCEDURE — 80061 LIPID PANEL: CPT

## 2021-12-06 ENCOUNTER — HOSPITAL ENCOUNTER (OUTPATIENT)
Facility: HOSPITAL | Age: 51
Setting detail: OUTPATIENT SURGERY
Discharge: HOME | End: 2021-12-06
Attending: OBSTETRICS & GYNECOLOGY | Admitting: OBSTETRICS & GYNECOLOGY
Payer: COMMERCIAL

## 2021-12-06 ENCOUNTER — ANESTHESIA (OUTPATIENT)
Dept: PERIOP | Facility: HOSPITAL | Age: 51
End: 2021-12-06
Payer: COMMERCIAL

## 2021-12-06 VITALS
RESPIRATION RATE: 18 BRPM | HEIGHT: 63 IN | DIASTOLIC BLOOD PRESSURE: 59 MMHG | SYSTOLIC BLOOD PRESSURE: 105 MMHG | WEIGHT: 204.37 LBS | HEART RATE: 94 BPM | BODY MASS INDEX: 36.21 KG/M2 | TEMPERATURE: 97.8 F | OXYGEN SATURATION: 98 %

## 2021-12-06 DIAGNOSIS — D25.9 UTERINE LEIOMYOMA, UNSPECIFIED LOCATION: ICD-10-CM

## 2021-12-06 DIAGNOSIS — N92.0 MENORRHAGIA WITH REGULAR CYCLE: ICD-10-CM

## 2021-12-06 DIAGNOSIS — G89.18 POSTOPERATIVE PAIN: Primary | ICD-10-CM

## 2021-12-06 DIAGNOSIS — R10.2 PELVIC PAIN IN FEMALE: ICD-10-CM

## 2021-12-06 PROBLEM — Z90.711 S/P LAPAROSCOPIC SUPRACERVICAL HYSTERECTOMY: Status: ACTIVE | Noted: 2021-12-06

## 2021-12-06 PROBLEM — Z90.79 STATUS POST BILATERAL SALPINGECTOMY: Status: ACTIVE | Noted: 2021-12-06

## 2021-12-06 LAB
ABO GROUP BLD: NORMAL
ERYTHROCYTE [DISTWIDTH] IN BLOOD BY AUTOMATED COUNT: 16 % (ref 11.6–15.1)
EXT PREGNANCY TEST URINE: NEGATIVE
EXT. CONTROL: NORMAL
HCT VFR BLD AUTO: 37.4 % (ref 34.8–46.1)
HGB BLD-MCNC: 12.3 G/DL (ref 11.5–15.4)
MCH RBC QN AUTO: 29.2 PG (ref 26.8–34.3)
MCHC RBC AUTO-ENTMCNC: 32.9 G/DL (ref 31.4–37.4)
MCV RBC AUTO: 89 FL (ref 82–98)
PLATELET # BLD AUTO: 421 THOUSANDS/UL (ref 149–390)
PMV BLD AUTO: 9.9 FL (ref 8.9–12.7)
RBC # BLD AUTO: 4.21 MILLION/UL (ref 3.81–5.12)
RH BLD: POSITIVE
WBC # BLD AUTO: 5.91 THOUSAND/UL (ref 4.31–10.16)

## 2021-12-06 PROCEDURE — 58544 LSH W/T/O UTERUS ABOVE 250 G: CPT | Performed by: OBSTETRICS & GYNECOLOGY

## 2021-12-06 PROCEDURE — 88307 TISSUE EXAM BY PATHOLOGIST: CPT | Performed by: SPECIALIST

## 2021-12-06 PROCEDURE — 85027 COMPLETE CBC AUTOMATED: CPT | Performed by: ANESTHESIOLOGY

## 2021-12-06 PROCEDURE — 81025 URINE PREGNANCY TEST: CPT | Performed by: OBSTETRICS & GYNECOLOGY

## 2021-12-06 RX ORDER — OXYCODONE HYDROCHLORIDE 5 MG/1
10 TABLET ORAL EVERY 4 HOURS PRN
Status: DISCONTINUED | OUTPATIENT
Start: 2021-12-06 | End: 2021-12-06 | Stop reason: HOSPADM

## 2021-12-06 RX ORDER — ONDANSETRON 2 MG/ML
INJECTION INTRAMUSCULAR; INTRAVENOUS AS NEEDED
Status: DISCONTINUED | OUTPATIENT
Start: 2021-12-06 | End: 2021-12-06

## 2021-12-06 RX ORDER — LIDOCAINE HYDROCHLORIDE 20 MG/ML
INJECTION, SOLUTION EPIDURAL; INFILTRATION; INTRACAUDAL; PERINEURAL AS NEEDED
Status: DISCONTINUED | OUTPATIENT
Start: 2021-12-06 | End: 2021-12-06

## 2021-12-06 RX ORDER — MIDAZOLAM HYDROCHLORIDE 2 MG/2ML
INJECTION, SOLUTION INTRAMUSCULAR; INTRAVENOUS AS NEEDED
Status: DISCONTINUED | OUTPATIENT
Start: 2021-12-06 | End: 2021-12-06

## 2021-12-06 RX ORDER — FENTANYL CITRATE/PF 50 MCG/ML
25 SYRINGE (ML) INJECTION
Status: DISCONTINUED | OUTPATIENT
Start: 2021-12-06 | End: 2021-12-06 | Stop reason: HOSPADM

## 2021-12-06 RX ORDER — OXYCODONE HYDROCHLORIDE AND ACETAMINOPHEN 5; 325 MG/1; MG/1
1 TABLET ORAL EVERY 4 HOURS PRN
Qty: 15 TABLET | Refills: 0 | Status: SHIPPED | OUTPATIENT
Start: 2021-12-06

## 2021-12-06 RX ORDER — OXYCODONE HYDROCHLORIDE 5 MG/1
5 TABLET ORAL EVERY 4 HOURS PRN
Status: DISCONTINUED | OUTPATIENT
Start: 2021-12-06 | End: 2021-12-06 | Stop reason: HOSPADM

## 2021-12-06 RX ORDER — ACETAMINOPHEN 325 MG/1
650 TABLET ORAL EVERY 6 HOURS PRN
Status: DISCONTINUED | OUTPATIENT
Start: 2021-12-06 | End: 2021-12-06 | Stop reason: HOSPADM

## 2021-12-06 RX ORDER — ONDANSETRON 2 MG/ML
4 INJECTION INTRAMUSCULAR; INTRAVENOUS ONCE AS NEEDED
Status: DISCONTINUED | OUTPATIENT
Start: 2021-12-06 | End: 2021-12-06 | Stop reason: HOSPADM

## 2021-12-06 RX ORDER — SODIUM CHLORIDE 9 MG/ML
125 INJECTION, SOLUTION INTRAVENOUS CONTINUOUS
Status: DISCONTINUED | OUTPATIENT
Start: 2021-12-06 | End: 2021-12-06 | Stop reason: HOSPADM

## 2021-12-06 RX ORDER — SODIUM CHLORIDE 9 MG/ML
1000 INJECTION, SOLUTION INTRAVENOUS ONCE
Status: COMPLETED | OUTPATIENT
Start: 2021-12-06 | End: 2021-12-06

## 2021-12-06 RX ORDER — ROCURONIUM BROMIDE 10 MG/ML
INJECTION, SOLUTION INTRAVENOUS AS NEEDED
Status: DISCONTINUED | OUTPATIENT
Start: 2021-12-06 | End: 2021-12-06

## 2021-12-06 RX ORDER — NEOSTIGMINE METHYLSULFATE 1 MG/ML
INJECTION INTRAVENOUS AS NEEDED
Status: DISCONTINUED | OUTPATIENT
Start: 2021-12-06 | End: 2021-12-06

## 2021-12-06 RX ORDER — DEXAMETHASONE SODIUM PHOSPHATE 4 MG/ML
INJECTION, SOLUTION INTRA-ARTICULAR; INTRALESIONAL; INTRAMUSCULAR; INTRAVENOUS; SOFT TISSUE AS NEEDED
Status: DISCONTINUED | OUTPATIENT
Start: 2021-12-06 | End: 2021-12-06

## 2021-12-06 RX ORDER — PROPOFOL 10 MG/ML
INJECTION, EMULSION INTRAVENOUS AS NEEDED
Status: DISCONTINUED | OUTPATIENT
Start: 2021-12-06 | End: 2021-12-06

## 2021-12-06 RX ORDER — GENTAMICIN SULFATE 80 MG/50ML
1.5 INJECTION, SOLUTION INTRAVENOUS ONCE
Status: COMPLETED | OUTPATIENT
Start: 2021-12-06 | End: 2021-12-06

## 2021-12-06 RX ORDER — ONDANSETRON 2 MG/ML
4 INJECTION INTRAMUSCULAR; INTRAVENOUS EVERY 6 HOURS PRN
Status: DISCONTINUED | OUTPATIENT
Start: 2021-12-06 | End: 2021-12-06 | Stop reason: HOSPADM

## 2021-12-06 RX ORDER — CLINDAMYCIN PHOSPHATE 900 MG/50ML
900 INJECTION INTRAVENOUS ONCE
Status: COMPLETED | OUTPATIENT
Start: 2021-12-06 | End: 2021-12-06

## 2021-12-06 RX ORDER — FENTANYL CITRATE 50 UG/ML
INJECTION, SOLUTION INTRAMUSCULAR; INTRAVENOUS AS NEEDED
Status: DISCONTINUED | OUTPATIENT
Start: 2021-12-06 | End: 2021-12-06

## 2021-12-06 RX ORDER — HYDROMORPHONE HCL/PF 1 MG/ML
0.5 SYRINGE (ML) INJECTION
Status: DISCONTINUED | OUTPATIENT
Start: 2021-12-06 | End: 2021-12-06 | Stop reason: HOSPADM

## 2021-12-06 RX ORDER — HYDROMORPHONE HCL/PF 1 MG/ML
SYRINGE (ML) INJECTION AS NEEDED
Status: DISCONTINUED | OUTPATIENT
Start: 2021-12-06 | End: 2021-12-06

## 2021-12-06 RX ORDER — GLYCOPYRROLATE 0.2 MG/ML
INJECTION INTRAMUSCULAR; INTRAVENOUS AS NEEDED
Status: DISCONTINUED | OUTPATIENT
Start: 2021-12-06 | End: 2021-12-06

## 2021-12-06 RX ORDER — EPHEDRINE SULFATE 50 MG/ML
INJECTION INTRAVENOUS AS NEEDED
Status: DISCONTINUED | OUTPATIENT
Start: 2021-12-06 | End: 2021-12-06

## 2021-12-06 RX ADMIN — LIDOCAINE HYDROCHLORIDE 80 MG: 20 INJECTION, SOLUTION EPIDURAL; INFILTRATION; INTRACAUDAL; PERINEURAL at 08:39

## 2021-12-06 RX ADMIN — GENTAMICIN SULFATE 80 MG: 80 INJECTION, SOLUTION INTRAVENOUS at 08:36

## 2021-12-06 RX ADMIN — ROCURONIUM BROMIDE 10 MG: 50 INJECTION, SOLUTION INTRAVENOUS at 09:41

## 2021-12-06 RX ADMIN — EPHEDRINE SULFATE 5 MG: 50 INJECTION, SOLUTION INTRAVENOUS at 09:13

## 2021-12-06 RX ADMIN — EPHEDRINE SULFATE 5 MG: 50 INJECTION, SOLUTION INTRAVENOUS at 09:26

## 2021-12-06 RX ADMIN — METHYLENE BLUE 50 MG: 5 INJECTION INTRAVENOUS at 10:35

## 2021-12-06 RX ADMIN — GLYCOPYRROLATE 0.4 MG: 0.2 INJECTION, SOLUTION INTRAMUSCULAR; INTRAVENOUS at 10:39

## 2021-12-06 RX ADMIN — NEOSTIGMINE METHYLSULFATE 3 MG: 1 INJECTION INTRAVENOUS at 10:39

## 2021-12-06 RX ADMIN — SODIUM CHLORIDE: 0.9 INJECTION, SOLUTION INTRAVENOUS at 09:48

## 2021-12-06 RX ADMIN — FENTANYL CITRATE 50 MCG: 50 INJECTION INTRAMUSCULAR; INTRAVENOUS at 10:06

## 2021-12-06 RX ADMIN — HYDROMORPHONE HYDROCHLORIDE 0.5 MG: 1 INJECTION, SOLUTION INTRAMUSCULAR; INTRAVENOUS; SUBCUTANEOUS at 10:42

## 2021-12-06 RX ADMIN — PROPOFOL 200 MG: 10 INJECTION, EMULSION INTRAVENOUS at 08:39

## 2021-12-06 RX ADMIN — FENTANYL CITRATE 50 MCG: 50 INJECTION INTRAMUSCULAR; INTRAVENOUS at 09:46

## 2021-12-06 RX ADMIN — FENTANYL CITRATE 50 MCG: 50 INJECTION INTRAMUSCULAR; INTRAVENOUS at 08:39

## 2021-12-06 RX ADMIN — DEXAMETHASONE SODIUM PHOSPHATE 8 MG: 4 INJECTION INTRA-ARTICULAR; INTRALESIONAL; INTRAMUSCULAR; INTRAVENOUS; SOFT TISSUE at 08:46

## 2021-12-06 RX ADMIN — SODIUM CHLORIDE 1000 ML/HR: 0.9 INJECTION, SOLUTION INTRAVENOUS at 13:00

## 2021-12-06 RX ADMIN — MIDAZOLAM 2 MG: 1 INJECTION INTRAMUSCULAR; INTRAVENOUS at 08:34

## 2021-12-06 RX ADMIN — ROCURONIUM BROMIDE 50 MG: 50 INJECTION, SOLUTION INTRAVENOUS at 08:40

## 2021-12-06 RX ADMIN — CLINDAMYCIN PHOSPHATE 900 MG: 900 INJECTION, SOLUTION INTRAVENOUS at 08:36

## 2021-12-06 RX ADMIN — SODIUM CHLORIDE: 0.9 INJECTION, SOLUTION INTRAVENOUS at 10:47

## 2021-12-06 RX ADMIN — EPHEDRINE SULFATE 5 MG: 50 INJECTION, SOLUTION INTRAVENOUS at 09:24

## 2021-12-06 RX ADMIN — ACETAMINOPHEN 650 MG: 325 TABLET, FILM COATED ORAL at 13:45

## 2021-12-06 RX ADMIN — ONDANSETRON 4 MG: 2 INJECTION INTRAMUSCULAR; INTRAVENOUS at 10:37

## 2021-12-06 RX ADMIN — FENTANYL CITRATE 50 MCG: 50 INJECTION INTRAMUSCULAR; INTRAVENOUS at 09:38

## 2021-12-06 RX ADMIN — SODIUM CHLORIDE 125 ML/HR: 0.9 INJECTION, SOLUTION INTRAVENOUS at 07:19

## 2021-12-06 RX ADMIN — PHENYLEPHRINE HYDROCHLORIDE 80 MCG/MIN: 10 INJECTION INTRAVENOUS at 09:23

## 2021-12-15 DIAGNOSIS — I10 ESSENTIAL HYPERTENSION: ICD-10-CM

## 2021-12-15 RX ORDER — AMLODIPINE BESYLATE 5 MG/1
TABLET ORAL
Qty: 30 TABLET | Refills: 0 | Status: SHIPPED | OUTPATIENT
Start: 2021-12-15 | End: 2021-12-31

## 2021-12-17 ENCOUNTER — TELEPHONE (OUTPATIENT)
Dept: GYNECOLOGY | Facility: CLINIC | Age: 51
End: 2021-12-17

## 2021-12-22 ENCOUNTER — TELEPHONE (OUTPATIENT)
Dept: UROLOGY | Facility: AMBULATORY SURGERY CENTER | Age: 51
End: 2021-12-22

## 2021-12-22 ENCOUNTER — OFFICE VISIT (OUTPATIENT)
Dept: GYNECOLOGY | Facility: CLINIC | Age: 51
End: 2021-12-22

## 2021-12-22 VITALS — HEART RATE: 107 BPM | DIASTOLIC BLOOD PRESSURE: 80 MMHG | SYSTOLIC BLOOD PRESSURE: 120 MMHG

## 2021-12-22 DIAGNOSIS — Z48.89 POSTOPERATIVE VISIT: Primary | ICD-10-CM

## 2021-12-22 PROCEDURE — 99024 POSTOP FOLLOW-UP VISIT: CPT | Performed by: OBSTETRICS & GYNECOLOGY

## 2021-12-27 ENCOUNTER — HOSPITAL ENCOUNTER (OUTPATIENT)
Dept: MAMMOGRAPHY | Facility: CLINIC | Age: 51
Discharge: HOME | End: 2021-12-27
Payer: COMMERCIAL

## 2021-12-27 ENCOUNTER — HOSPITAL ENCOUNTER (OUTPATIENT)
Dept: ULTRASOUND IMAGING | Facility: CLINIC | Age: 51
Discharge: HOME | End: 2021-12-27
Payer: COMMERCIAL

## 2021-12-27 VITALS — BODY MASS INDEX: 36.14 KG/M2 | HEIGHT: 63 IN | WEIGHT: 204 LBS

## 2021-12-27 DIAGNOSIS — R92.8 ABNORMAL MAMMOGRAM: ICD-10-CM

## 2021-12-27 PROCEDURE — 77065 DX MAMMO INCL CAD UNI: CPT

## 2021-12-27 PROCEDURE — G0279 TOMOSYNTHESIS, MAMMO: HCPCS

## 2021-12-27 PROCEDURE — 76642 ULTRASOUND BREAST LIMITED: CPT

## 2021-12-31 DIAGNOSIS — I10 ESSENTIAL HYPERTENSION: ICD-10-CM

## 2021-12-31 RX ORDER — AMLODIPINE BESYLATE 5 MG/1
TABLET ORAL
Qty: 90 TABLET | Refills: 1 | Status: SHIPPED | OUTPATIENT
Start: 2021-12-31 | End: 2022-02-28 | Stop reason: SDUPTHER

## 2022-01-05 ENCOUNTER — TELEPHONE (OUTPATIENT)
Dept: UROLOGY | Facility: AMBULATORY SURGERY CENTER | Age: 52
End: 2022-01-05

## 2022-01-05 NOTE — TELEPHONE ENCOUNTER
Hello,    Patient is scheduled to be seen at [facility]) Urology (MOQ:[id number]) on 1/13, and needs a referral for the visit  The diagnosis code needed for the visit is C67 9, and the procedure codes needed for the visit will be 28621, and [id number]  Thank you for your time and help

## 2022-02-14 DIAGNOSIS — I10 ESSENTIAL HYPERTENSION: ICD-10-CM

## 2022-02-14 RX ORDER — LISINOPRIL 40 MG/1
TABLET ORAL
Qty: 90 TABLET | Refills: 0 | Status: SHIPPED | OUTPATIENT
Start: 2022-02-14 | End: 2022-02-28 | Stop reason: SDUPTHER

## 2022-02-15 ENCOUNTER — PROCEDURE VISIT (OUTPATIENT)
Dept: UROLOGY | Facility: CLINIC | Age: 52
End: 2022-02-15
Payer: COMMERCIAL

## 2022-02-15 VITALS
SYSTOLIC BLOOD PRESSURE: 148 MMHG | HEART RATE: 82 BPM | WEIGHT: 218 LBS | DIASTOLIC BLOOD PRESSURE: 88 MMHG | BODY MASS INDEX: 39.24 KG/M2

## 2022-02-15 DIAGNOSIS — Z90.711 S/P LAPAROSCOPIC SUPRACERVICAL HYSTERECTOMY: Primary | ICD-10-CM

## 2022-02-15 PROBLEM — Q64.79 STRUCTURAL ABNORMALITY OF BLADDER: Status: ACTIVE | Noted: 2022-02-15

## 2022-02-15 PROCEDURE — 3077F SYST BP >= 140 MM HG: CPT | Performed by: UROLOGY

## 2022-02-15 PROCEDURE — 3079F DIAST BP 80-89 MM HG: CPT | Performed by: UROLOGY

## 2022-02-15 PROCEDURE — 1036F TOBACCO NON-USER: CPT | Performed by: UROLOGY

## 2022-02-15 PROCEDURE — 99204 OFFICE O/P NEW MOD 45 MIN: CPT | Performed by: UROLOGY

## 2022-02-15 PROCEDURE — 88112 CYTOPATH CELL ENHANCE TECH: CPT | Performed by: PATHOLOGY

## 2022-02-15 PROCEDURE — 52000 CYSTOURETHROSCOPY: CPT | Performed by: UROLOGY

## 2022-02-15 NOTE — PROGRESS NOTES
UROLOGY NEW CONSULT NOTE     CHIEF Castro Gonzalez is a 45 y o  female with a complaint of No chief complaint on file  History of Present Illness:     45 y o  female who underwent laparoscopic hysterectomy and bilateral salpingectomy in 2021  At the end of the procedure, cystoscopy was performed  There appeared to be an area of raised abnormal tissue the left side of the bladder above the trigone  Urology was not contacted  Patient was referred for discussion and cystoscopy  Patient was contacted 1st in December but was unreachable  Certified letter was sent to arrange appointment today  Patient does present for her appointment today  She is somewhat confused about the need for the appointment and does not necessarily recollect being told anything about her bladder  She has not had any urinary issues other than frequency related to her hydrochlorothiazide and lisinopril  She denies gross hematuria  She denies a history of smoking and she denies a family history of urologic malignancy      Past Medical History:     Past Medical History:   Diagnosis Date    Asthma     Breathing difficulty     post issues with difficulty breathing    GERD (gastroesophageal reflux disease)     Hypertension     Lumbar radiculopathy 2016       PAST SURGICAL HISTORY:     Past Surgical History:   Procedure Laterality Date    BREAST BIOPSY Left 06/10/2021    us bx- neg     SECTION      CYSTOSCOPY N/A 2021    Procedure: CYSTOSCOPY;  Surgeon: Ashley Eason DO;  Location: [facility];  Service: Gynecology    PA LAP, SUPRACERVIAL HYSTERECTOMY W/ TUBE&OV, <250G N/A 2021    Procedure: 18 [facility] WITH B/L SALPINGECTOMY;  Surgeon: Ashley Eason DO;  Location: [facility];  Service: Gynecology    REDUCTION MAMMAPLASTY Bilateral     US GUIDED BREAST BIOPSY LEFT COMPLETE Left 6/10/2021       CURRENT MEDICATIONS:     Current Outpatient Medications   Medication Sig Dispense Refill    amLODIPine (NORVASC) 5 mg tablet TAKE 1 TABLET BY MOUTH EVERY DAY 90 tablet 1    Blood Pressure Monitoring (Blood Pressure Cuff) MISC Use 2 (two) times a day Omron, Upper arm, large cuff 1 each 0    hydrochlorothiazide (HYDRODIURIL) 25 mg tablet Take 1 tablet (25 mg total) by mouth daily 90 tablet 1    lisinopril (ZESTRIL) 40 mg tablet TAKE 1 TABLET BY MOUTH EVERY DAY 90 tablet 0    oxyCODONE-acetaminophen (PERCOCET) 5-325 mg per tablet Take 1 tablet by mouth every 4 (four) hours as needed for severe pain for up to 15 doses Max Daily Amount: 6 tablets (Patient not taking: Reported on 12/22/2021 ) 15 tablet 0     No current facility-administered medications for this visit         ALLERGIES:     Allergies   Allergen Reactions    Aspirin Other (See Comments)     Reaction Date: 17Apr2012;   Chest tight    Calcium Carbonate      Reaction Date: 17Apr2012;     Ibuprofen      Reaction Date: 17Apr2012;     Penicillins Swelling       SOCIAL HISTORY:     Social History     Socioeconomic History    Marital status: /Civil Union     Spouse name: Not on file    Number of children: Not on file    Years of education: Not on file    Highest education level: Not on file   Occupational History    Not on file   Tobacco Use    Smoking status: Never Smoker    Smokeless tobacco: Never Used   Vaping Use    Vaping Use: Never used   Substance and Sexual Activity    Alcohol use: Not Currently    Drug use: Never     Comment: occassional wine    Sexual activity: Not on file   Other Topics Concern    Not on file   Social History Narrative    Not on file     Social Determinants of Health     Financial Resource Strain: Not on file   Food Insecurity: Not on file   Transportation Needs: Not on file   Physical Activity: Not on file   Stress: Not on file   Social Connections: Not on file   Intimate Partner Violence: Not on file   Housing Stability: Not on file       SOCIAL HISTORY:     Family History Problem Relation Age of Onset    Hypertension Mother     Diabetes Father     No Known Problems Sister     No Known Problems Daughter     No Known Problems Maternal Grandmother     No Known Problems Maternal Grandfather     No Known Problems Paternal Grandmother     No Known Problems Paternal Grandfather     No Known Problems Sister     No Known Problems Sister     No Known Problems Sister     No Known Problems Sister     No Known Problems Daughter     No Known Problems Daughter     No Known Problems Paternal Aunt     No Known Problems Son     No Known Problems Son        REVIEW OF SYSTEMS:     Review of Systems   Constitutional: Negative  Respiratory: Negative  Cardiovascular: Negative  Gastrointestinal: Negative  Genitourinary: Negative  Musculoskeletal: Negative  Skin: Negative  Psychiatric/Behavioral: Negative  PHYSICAL EXAM:     LMP 04/11/2021     General:  Healthy appearing Black female in no acute distress  They have a normal affect  There is not appear to be any gross neurologic defects or abnormalities  HEENT:  Normocephalic, atraumatic  Neck is supple without any palpable lymphadenopathy  Cardiovascular:  Patient has normal palpable distal radial pulses  There is no significant peripheral edema  No JVD is noted  Respiratory:  Patient has unlabored respirations  There is no audible wheeze or rhonchi  Abdomen:  Abdomen is obese  Abdomen is soft and nontender  There is no tympany  Inguinal and umbilical hernia are not appreciated  Genitourinary:  Normal external female genitalia    Musculoskeletal:  Patient does not have significant CVA tenderness in the  flank with palpation or percussion  They full range of motion in all 4 extremities  Strength in all 4 extremities appears congruent  Patient is able to ambulate without assistance or difficulty  Dermatologic:  Patient has no skin abnormalities or rashes        LABS:     CBC:   Lab Results Component Value Date    WBC 5 91 12/06/2021    HGB 12 3 12/06/2021    HCT 37 4 12/06/2021    MCV 89 12/06/2021     (H) 12/06/2021       BMP:   Lab Results   Component Value Date    GLUCOSE 90 09/11/2015    CALCIUM 9 8 11/19/2021     09/11/2015    K 4 1 11/19/2021    CO2 22 11/19/2021     11/19/2021    BUN 8 11/19/2021    CREATININE 0 82 11/19/2021       IMAGING:     No  imaging    PROCEDURE:     SEE NOTE    ASSESSMENT:     45 y o  female with concern for bladder abnormality at the time of GYN intraoperative cystoscopy    PLAN:     Patient had a normal cystoscopy  There were no papillary tumors along the left david trigone or posterior to the left ureteral orifice as described in the operative note  The remainder of the bladder surface was normal   For completion, urine cytology was sent  Given the manipulation today, if this study comes back negative or atypical, will not recommend additional follow-up  I am ruling out a positive cytology    Patient was pleased with the negative results of the scope

## 2022-02-15 NOTE — PROGRESS NOTES
Cystoscopy     Date/Time 2/15/2022 1:59 PM     Performed by  Miky Mcdermott MD     Authorized by Miky Mcdermott MD      Universal Protocol:  Timeout called at: 2/15/2022 1:59 PM       Procedure Details:  Procedure type: cystoscopy    Patient tolerance: Patient tolerated the procedure well with no immediate complications    Additional Procedure Details:   Cystoscopy Procedure note    Risk and benefits of flexible cystoscopy were discussed  Informed consent was obtained  The patient was placed into the modified supine position  Her genitalia was prepped with Betadine and draped in a sterile fashion  Viscous 2% lidocaine was instilled into the urethra and allowed dwell time for local anesthesia  Flexible cystoscopy was then performed with a 16F scope  Urethra was inspected on entrance and exit of the scope  The bladder was thoroughly inspected in a 360 degree fashion  Both ureteral orifices were visualized in their orthotopic location with clear efflux of urine  The bladder mucosa was thoroughly inspected  There was no evidence of mucosal abnormalities, stones, or lesions  I specifically took great care re-examining the left david trigone and area circumferential to the left ureteral orifice  No abnormalities were noted  Urine cytology was obtained through the scope given the patient's inability to urinate prior to the procedure  Retroflexion for evaluation of the bladder neck was normal       Overall this was a negative cystoscopy  A female office staff member was present throughout the entire procedure

## 2022-02-17 ENCOUNTER — TELEPHONE (OUTPATIENT)
Dept: UROLOGY | Facility: CLINIC | Age: 52
End: 2022-02-17

## 2022-02-17 NOTE — TELEPHONE ENCOUNTER
Voicemail message left per communication consent regarding negative urine cytology results and that no follow up is needed at this time unless issues arise

## 2022-02-28 DIAGNOSIS — I10 ESSENTIAL HYPERTENSION: ICD-10-CM

## 2022-02-28 RX ORDER — AMLODIPINE BESYLATE 5 MG/1
5 TABLET ORAL DAILY
Qty: 90 TABLET | Refills: 1 | Status: SHIPPED | OUTPATIENT
Start: 2022-02-28

## 2022-02-28 RX ORDER — HYDROCHLOROTHIAZIDE 25 MG/1
25 TABLET ORAL DAILY
Qty: 90 TABLET | Refills: 1 | Status: SHIPPED | OUTPATIENT
Start: 2022-02-28

## 2022-02-28 RX ORDER — LISINOPRIL 40 MG/1
40 TABLET ORAL DAILY
Qty: 90 TABLET | Refills: 0 | Status: SHIPPED | OUTPATIENT
Start: 2022-02-28

## 2022-05-25 ENCOUNTER — RA CDI HCC (OUTPATIENT)
Dept: OTHER | Facility: HOSPITAL | Age: 52
End: 2022-05-25

## 2022-05-25 NOTE — PROGRESS NOTES
[place] [id number]  coding opportunities     In basket not responded to , suggestions not used  Based on clinical documentation indicated in your record, it appears that the patient may have the following condition;    E66 01, Z68 39 Severe obesity BMI 35 0 - 39 9 with comorbidity ([facility] 75 )     If this is correct, please document and assess at your next visit  5/31/22  Based on the patient problem list the following diagnoses are shown as active but not on the BPA      D57 3 Sickle cell trait ([place] [address] )    If this is correct please assess and document at your next visit 5/31/22             Chart Reviewed number of suggestions sent to Provider: 2     Patients Insurance        Commercial Insurance: [address]            Patients Insurance        Commercial Insurance: [address]

## 2022-05-31 ENCOUNTER — OFFICE VISIT (OUTPATIENT)
Dept: FAMILY MEDICINE CLINIC | Facility: CLINIC | Age: 52
End: 2022-05-31
Payer: COMMERCIAL

## 2022-05-31 VITALS
BODY MASS INDEX: 36.36 KG/M2 | HEIGHT: 63 IN | HEART RATE: 109 BPM | RESPIRATION RATE: 12 BRPM | WEIGHT: 205.2 LBS | DIASTOLIC BLOOD PRESSURE: 100 MMHG | SYSTOLIC BLOOD PRESSURE: 160 MMHG | OXYGEN SATURATION: 99 %

## 2022-05-31 DIAGNOSIS — M25.561 ACUTE PAIN OF RIGHT KNEE: Primary | ICD-10-CM

## 2022-05-31 DIAGNOSIS — M70.50 ANSERINE BURSITIS: ICD-10-CM

## 2022-05-31 PROCEDURE — 99214 OFFICE O/P EST MOD 30 MIN: CPT | Performed by: INTERNAL MEDICINE

## 2022-05-31 RX ORDER — CELECOXIB 200 MG/1
200 CAPSULE ORAL 2 TIMES DAILY
Qty: 30 CAPSULE | Refills: 0 | Status: SHIPPED | OUTPATIENT
Start: 2022-05-31

## 2022-05-31 NOTE — LETTER
May 31, 2022     Patient: Ashley Gonzalez  YOB: 1970  Date of Visit: 5/31/2022      To Whom it May Concern:    Esther Gonzalez is under my professional care  Ashley Styles was seen in my office on 5/31/2022  Esther cannot do prolonged walking, standing  No climbing or kneeling   due to worsening of the right knee pain  Restrictions will being in place for 1 month  If you have any questions or concerns, please don't hesitate to call           Sincerely,          Tamy Thornton MD        CC: No Recipients

## 2022-05-31 NOTE — PATIENT INSTRUCTIONS
Start Celebrex 1 pill twice a day for 7 days after that you can use it as needed  Please come for follow-up for annual checkup in 4 weeks  Please do x-ray

## 2022-06-02 PROBLEM — M25.561 ACUTE PAIN OF RIGHT KNEE: Status: ACTIVE | Noted: 2022-06-02

## 2022-06-02 PROBLEM — M70.50 ANSERINE BURSITIS: Status: ACTIVE | Noted: 2022-06-02

## 2022-06-02 NOTE — ASSESSMENT & PLAN NOTE
Very tender to palpation at the projection of the right anserine bursa  We discussed with her that she needs to start on losing weight to help with the symptoms  Will also start her on Celebrex for symptomatic management  She will let me know if no resolution  Will do x-ray of the right knee to exclude any gross changes there  Her blood pressure was recheck by me 140/80, okay to use short-term NSAIDs  Side effects discussed

## 2022-06-02 NOTE — PROGRESS NOTES
Assessment/Plan:    Acute pain of right knee  Very tender to palpation at the projection of the right anserine bursa  We discussed with her that she needs to start on losing weight to help with the symptoms  Will also start her on Celebrex for symptomatic management  She will let me know if no resolution  Will do x-ray of the right knee to exclude any gross changes there  Her blood pressure was recheck by me 140/80, okay to use short-term NSAIDs  Side effects discussed  Diagnoses and all orders for this visit:    Acute pain of right knee  -     celecoxib (CeleBREX) 200 mg capsule; Take 1 capsule (200 mg total) by mouth 2 (two) times a day  -     XR knee 3 vw right non injury; Future  -     Ambulatory Referral to Physical Therapy; Future    Anserine bursitis          Subjective:      Patient ID: Esther Gonzalez is a 45 y o  female  Patient came today with complaints of acute right knee pain that started not too long ago  The following portions of the patient's history were reviewed and updated as appropriate: allergies, current medications, past family history, past medical history, past social history, past surgical history, and problem list     Review of Systems   Musculoskeletal: Positive for arthralgias  Negative for back pain, gait problem and joint swelling  Skin: Negative for color change           Objective:      /100 (BP Location: Left arm, Patient Position: Sitting, Cuff Size: Standard)   Pulse (!) 109   Resp 12   Ht 5' 2 5" (1 588 m)   Wt 93 1 kg (205 lb 3 2 oz)   LMP 04/11/2021   SpO2 99%   BMI 36 93 kg/m²     Allergies   Allergen Reactions    Aspirin Other (See Comments)     Reaction Date: 17Apr2012;   Chest tight    Calcium Carbonate      Reaction Date: 17Apr2012;     Ibuprofen      Reaction Date: 17Apr2012;     Penicillins Swelling          Current Outpatient Medications:     amLODIPine (NORVASC) 5 mg tablet, Take 1 tablet (5 mg total) by mouth daily, Disp: 90 tablet, Rfl: 1    celecoxib (CeleBREX) 200 mg capsule, Take 1 capsule (200 mg total) by mouth 2 (two) times a day, Disp: 30 capsule, Rfl: 0    hydrochlorothiazide (HYDRODIURIL) 25 mg tablet, Take 1 tablet (25 mg total) by mouth daily, Disp: 90 tablet, Rfl: 1    lisinopril (ZESTRIL) 40 mg tablet, Take 1 tablet (40 mg total) by mouth daily, Disp: 90 tablet, Rfl: 0    Blood Pressure Monitoring (Blood Pressure Cuff) MISC, Use 2 (two) times a day [employer], Upper arm, large cuff, Disp: 1 each, Rfl: 0    oxyCODONE-acetaminophen (PERCOCET) 5-325 mg per tablet, Take 1 tablet by mouth every 4 (four) hours as needed for severe pain for up to 15 doses Max Daily Amount: 6 tablets (Patient not taking: No sig reported), Disp: 15 tablet, Rfl: 0     Patient Instructions    Start Celebrex 1 pill twice a day for 7 days after that you can use it as needed  Please come for follow-up for annual checkup in 4 weeks  Please do x-ray  Physical Exam  Constitutional:       General: She is not in acute distress  Appearance: She is not ill-appearing or toxic-appearing  Musculoskeletal:         General: Tenderness present  No swelling  Neurological:      Mental Status: She is alert

## 2022-06-17 ENCOUNTER — HOSPITAL ENCOUNTER (OUTPATIENT)
Dept: RADIOLOGY | Facility: HOSPITAL | Age: 52
Discharge: HOME | End: 2022-06-17
Payer: COMMERCIAL

## 2022-06-17 DIAGNOSIS — M25.561 ACUTE PAIN OF RIGHT KNEE: ICD-10-CM

## 2022-06-17 PROCEDURE — 73562 X-RAY EXAM OF KNEE 3: CPT

## 2022-06-21 ENCOUNTER — TELEPHONE (OUTPATIENT)
Dept: FAMILY MEDICINE CLINIC | Facility: CLINIC | Age: 52
End: 2022-06-21

## 2022-06-23 ENCOUNTER — EVALUATION (OUTPATIENT)
Dept: PHYSICAL THERAPY | Facility: MEDICAL CENTER | Age: 52
End: 2022-06-23
Payer: COMMERCIAL

## 2022-06-23 DIAGNOSIS — M25.561 ACUTE PAIN OF RIGHT KNEE: Primary | ICD-10-CM

## 2022-06-23 PROCEDURE — 97110 THERAPEUTIC EXERCISES: CPT | Performed by: PHYSICAL THERAPIST

## 2022-06-23 PROCEDURE — 97161 PT EVAL LOW COMPLEX 20 MIN: CPT | Performed by: PHYSICAL THERAPIST

## 2022-06-23 NOTE — PROGRESS NOTES
PT Evaluation     Today's date: 2022  Patient name: Dale Westbrook  : 1970  MRN: [id number]  Referring provider: Cyndy Moreno MD  Dx:   Encounter Diagnosis     ICD-10-CM    1  Acute pain of right knee  M25 561 Ambulatory Referral to Physical Therapy                  Assessment  Assessment details: Esther Gonzalez is a pleasant 45 y o  female who presents with acute R anterolateral knee pain of an insidious onset  The pain is located near her R lateral patella with no radiating pain or paresthesias  She does report baseline bilateral foot numbness since childhood that has not changed since the onset of knee pain  No further referral is necessary at this time based upon examination results  The primary movement impairment is R quadriceps weakness as a result of acute pain of R knee, which limits her ability to perform sit to stand transfers and supine to sit transfers  Patient also presents with hip girdle weakness, which contributes to poor dynamic valgus control and excessive compensatory stress on R patellofemoral structures when squatting during job duties  Patient would benefit from skilled PT services to address the listed impairments to facilitate a return to PLOF  Thank you for the referral   Impairments: abnormal muscle firing, abnormal muscle tone, abnormal or restricted ROM, abnormal movement, activity intolerance, impaired balance, impaired physical strength, lacks appropriate home exercise program and pain with function  Functional limitations: transfers, squatting, gym routine  Symptom irritability: lowBarriers to therapy: none  Understanding of Dx/Px/POC: good   Prognosis: good  Prognosis details: Positive prognostic indicators include positive attitude toward recovery  Negative prognostic indicators include hypertension, hx of anemia       Goals  STG:  Patient will be independent with home exercise program    Patient will be able to perform good quality QS on R knee to demonstrate improved quad strength for transfers  LTG:  Patient will increase bilateral hip strength by at least 1-2 grades via MMT to improve ability to squat with less stress on R knee  Patient will increase R quadriceps strength to at least 4+/5 to be able to stand to rise from a chair  Patient will be able to perform sit to stand transfers  Patient will transition to a gym routine with modifications as necessary  Patient will be able to manage symptoms independently  Plan  Plan details: Prognosis is above given PT services 2x/week tapering to 1x/week over the next 6 weeks and given HEP adherence  Patient would benefit from: skilled physical therapy  Referral necessary: No  Planned modality interventions: cryotherapy and thermotherapy: hydrocollator packs  Planned therapy interventions: activity modification, balance, flexibility, functional ROM exercises, graded activity, graded exercise, home exercise program, joint mobilization, manual therapy, massage, Wellington taping, neuromuscular re-education, patient education, strengthening, stretching, therapeutic activities and therapeutic exercise  Frequency: 2x week  Duration in weeks: 6  Treatment plan discussed with: patient        Subjective Evaluation    History of Present Illness  Mechanism of injury: This is a 44 yo female presenting with R knee pain that began gradually 3 months ago  She notes that the pain was located on the front and back of her knee, and she had swelling into the front and inside of her knee  The pain limited her ability to walk, stand, and squat  She received medication for arthritis from her physician, which helped decrease her swelling and decrease her pain  She now has occasional pain on the outside of her R kneecap when standing after sitting for prolonged periods or when bending her knee after being in a straightened position   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 throughout the day  She notes that both of her feet are numb, but this has been present since childhood and has not worsened since her knee pain began  She notes no other paresthesias  Not a recurrent problem   Quality of life: good    Pain  Current pain ratin  At best pain ratin  At worst pain ratin  Location: front/outside of R knee  Quality: sharp  Relieving factors: medications  Aggravating factors: sitting (supine to sit transfers, sit to stand transfers)  Progression: improved    Social Support    Employment status: working (manager for a group home)    Diagnostic Tests  X-ray: abnormal (degenerative changes)  Treatments  No previous or current treatments  Patient Goals  Patient goals for therapy: decreased pain, increased strength, independence with ADLs/IADLs and return to sport/leisure activities  Patient goal: to begin a gym routine, to be able to lie down and bend knee        Objective     Observations   Left Knee   Negative for edema  Right Knee   Negative for edema  Tenderness     Right Knee   Tenderness in the pes anserinus  Active Range of Motion   Left Hip   External rotation (90/90): [place] [place]  Internal rotation (90/90): WFL    Right Hip   External rotation (90/90): [place] [place]  Internal rotation (90/90): WFL  Left Knee   Hyperextension  Flexion: 120 degrees   Extension: -1 degrees     Right Knee   Hyperextension   Flexion: 120 degrees   Extension: -1 degrees     Passive Range of Motion   Left Hip   External rotation (90/90): [place] [place]  External rotation (prone): [facility] [place]  Internal rotation (90/90): [place] [place]  Internal rotation (prone): WFL    Right Hip   External rotation (90/90): [facility] [place]  External rotation (prone): [facility] [place]  Internal rotation (90/90): [facility] [place]  Internal rotation (prone): [place] [place]    Mobility   Patellar Mobility:   Left Knee   WFL: medial, lateral, superior and inferior       Right Knee   WFL: medial, lateral, superior and inferior    Strength/Myotome Testing     Left Hip   Planes of Motion   Flexion: 3+  Abduction: 3+  External rotation: 4-  Internal rotation: 4-    Right Hip   Planes of Motion   Flexion: 3+  Abduction: 3  External rotation: 4-  Internal rotation: 4-    Left Knee   Flexion: 5  Extension: 5  Quadriceps contraction: good    Right Knee   Flexion: 4-  Extension: 4-  Quadriceps contraction: fair    Tests     Left Knee   Negative active quad, lateral Ariana, medial Ariana, valgus stress test at 30 degrees and varus stress test at 30 degrees  Right Knee   Positive active quad  Negative lateral Ariana, medial Ariana, valgus stress test at 30 degrees and varus stress test at 30 degrees  Additional Tests Details  (-) Wellington fat pad testing bilaterally    Functional Assessment      Squat    Pain, sitting toward left side, left tibial anterior translation beyond toes, right valgus and right tibial anterior translation beyond toes       Single Leg Stance   Left: 7 seconds  Right: 3 seconds             Precautions: HTN, sickle cell trait      Manuals 6/23            Seated R knee distraction                                                    Neuro Re-Ed                                                                                                        Ther Ex             Rec bike             QS 5"x30 HEP            SLR- 4-way NV            Supine clams 5"x20 RTB HEP            Supine hip ADD isometrics NV            Bridges  NV            Standing hip ABD             Wall ball squats             Leg press             HEP education and instruction 10'            Ther Activity                                       Gait Training                                       Modalities

## 2022-06-30 ENCOUNTER — OFFICE VISIT (OUTPATIENT)
Dept: PHYSICAL THERAPY | Facility: MEDICAL CENTER | Age: 52
End: 2022-06-30
Payer: COMMERCIAL

## 2022-06-30 DIAGNOSIS — M25.561 ACUTE PAIN OF RIGHT KNEE: Primary | ICD-10-CM

## 2022-06-30 PROCEDURE — 97140 MANUAL THERAPY 1/> REGIONS: CPT | Performed by: PHYSICAL THERAPIST

## 2022-06-30 PROCEDURE — 97110 THERAPEUTIC EXERCISES: CPT | Performed by: PHYSICAL THERAPIST

## 2022-06-30 NOTE — PROGRESS NOTES
Daily Note     Today's date: 2022  Patient name: Lidia Sterling  : 1970  MRN: [id number]  Referring provider: Angel Mckeon MD  Dx:   Encounter Diagnosis     ICD-10-CM    1  Acute pain of right knee  M25 561                   Subjective: Patient reports that her knee is feeling better compared to last session  Objective: See treatment diary below      Assessment: Patient responded well to seated knee distraction with report of decreased knee pain when ambulating post-tx  Patient demonstrated fatigue after addition of SLR but was able to complete strengthening interventions without pain  Patient was given tband of increased resistance for supine clams during HEP performance, which demonstrates good progress toward goals  Patient demonstrated fatigue at end of session  All exercises performed in a pain-free range  Patient would benefit from continued PT to address impairments to maximize function  Plan: Continue per plan of care        Precautions: HTN, sickle cell trait      Manuals            Seated R knee distraction  KP Gr  II-III                                                  Neuro Re-Ed                                                                                                        Ther Ex             Rec bike  5'           QS 5"x30 HEP 5"x30           SLR- 4-way NV 2x10 FLX           Supine clams 5"x20 RTB HEP 5"x20 GTB           Supine hip ADD isometrics NV 5"x20           Bridges  NV 2x10           Heel raises  3"x20           Standing hip ABD             Wall ball squats             Leg press             HEP education and instruction 10'            Ther Activity                                       Gait Training                                       Modalities

## 2022-07-07 ENCOUNTER — OFFICE VISIT (OUTPATIENT)
Dept: PHYSICAL THERAPY | Facility: MEDICAL CENTER | Age: 52
End: 2022-07-07
Payer: COMMERCIAL

## 2022-07-07 DIAGNOSIS — M25.561 ACUTE PAIN OF RIGHT KNEE: Primary | ICD-10-CM

## 2022-07-07 PROCEDURE — 97140 MANUAL THERAPY 1/> REGIONS: CPT | Performed by: PHYSICAL THERAPIST

## 2022-07-07 PROCEDURE — 97110 THERAPEUTIC EXERCISES: CPT | Performed by: PHYSICAL THERAPIST

## 2022-07-07 NOTE — PROGRESS NOTES
Daily Note     Today's date: 2022  Patient name: Gloria Pina  : 1970  MRN: [id number]  Referring provider: Kelley Dawson MD  Dx:   Encounter Diagnosis     ICD-10-CM    1  Acute pain of right knee  M25 561               Addendum from 22: Spoke with patient via phone  She reported alleviation of symptoms and no remaining limitations  She prefers to be discharged from formal PT to her HEP  Patient is now discharged to her HEP  Subjective: Patient reports that her knee is feeling really good since last session with no pain  Objective: See treatment diary below      Assessment: Continued with seated knee distraction due to positive response after this intervention at prior sessions with decreased pain  Patient was able to progress resistance for SLR, which further demonstrates good progress toward goals  Cueing provided to prevent tibial ER during standing hip ABD today  Progressed HEP today with addition of supine ADD isometrics and SLR to further improve knee and hip girdle strength  Patient demonstrated fatigue at end of session  All exercises performed in a pain-free range  Due to good progress toward goals, plan to decrease frequency to 1x/week to focus on strengthening to prevent recurrence of knee pain during daily activities  Plan: Due to good progress toward goals, plan to decrease frequency to 1x/week to focus on strengthening        Precautions: HTN, sickle cell trait      Manuals           Seated R knee distraction  KP Gr  II-III KP Gr  II-III                                                 Neuro Re-Ed                                                                                                        Ther Ex             Rec bike  5' 5'          QS 5"x30 HEP 5"x30 5"x30          SLR- 4-way NV 2x10 FLX x10 FLX 1#          Supine clams 5"x20 RTB HEP 5"x20 GTB 5"x30 blue          Supine hip ADD isometrics NV 5"x20 5"x30          Bridges  NV 2x10 2x10 Heel raises  3"x20 3"x20          Standing hip ABD   x10 ea          Wall ball squats             Leg press             HEP education and instruction 10'            Ther Activity                                       Gait Training                                       Modalities

## 2022-07-11 ENCOUNTER — APPOINTMENT (OUTPATIENT)
Dept: PHYSICAL THERAPY | Facility: MEDICAL CENTER | Age: 52
End: 2022-07-11
Payer: COMMERCIAL

## 2022-07-19 ENCOUNTER — APPOINTMENT (OUTPATIENT)
Dept: PHYSICAL THERAPY | Facility: MEDICAL CENTER | Age: 52
End: 2022-07-19
Payer: COMMERCIAL

## 2022-07-21 ENCOUNTER — APPOINTMENT (OUTPATIENT)
Dept: PHYSICAL THERAPY | Facility: MEDICAL CENTER | Age: 52
End: 2022-07-21
Payer: COMMERCIAL

## 2022-07-26 ENCOUNTER — APPOINTMENT (OUTPATIENT)
Dept: PHYSICAL THERAPY | Facility: MEDICAL CENTER | Age: 52
End: 2022-07-26
Payer: COMMERCIAL

## 2022-07-28 ENCOUNTER — APPOINTMENT (OUTPATIENT)
Dept: PHYSICAL THERAPY | Facility: MEDICAL CENTER | Age: 52
End: 2022-07-28
Payer: COMMERCIAL

## 2022-08-05 PROCEDURE — 99283 EMERGENCY DEPT VISIT LOW MDM: CPT

## 2022-08-06 ENCOUNTER — HOSPITAL ENCOUNTER (EMERGENCY)
Facility: HOSPITAL | Age: 52
Discharge: HOME | End: 2022-08-06
Payer: COMMERCIAL

## 2022-08-06 VITALS
TEMPERATURE: 98.3 F | WEIGHT: 210.54 LBS | RESPIRATION RATE: 18 BRPM | DIASTOLIC BLOOD PRESSURE: 104 MMHG | OXYGEN SATURATION: 100 % | BODY MASS INDEX: 37.89 KG/M2 | HEART RATE: 79 BPM | SYSTOLIC BLOOD PRESSURE: 170 MMHG

## 2022-08-06 DIAGNOSIS — M54.16 LUMBAR RADICULOPATHY: Primary | ICD-10-CM

## 2022-08-06 PROCEDURE — 99284 EMERGENCY DEPT VISIT MOD MDM: CPT

## 2022-08-06 RX ORDER — ACETAMINOPHEN 325 MG/1
975 TABLET ORAL ONCE
Status: COMPLETED | OUTPATIENT
Start: 2022-08-06 | End: 2022-08-06

## 2022-08-06 RX ORDER — DIAZEPAM 5 MG/1
5 TABLET ORAL ONCE
Status: COMPLETED | OUTPATIENT
Start: 2022-08-06 | End: 2022-08-06

## 2022-08-06 RX ORDER — METHOCARBAMOL 500 MG/1
500 TABLET, FILM COATED ORAL 2 TIMES DAILY
Qty: 20 TABLET | Refills: 0 | Status: SHIPPED | OUTPATIENT
Start: 2022-08-06

## 2022-08-06 RX ORDER — LIDOCAINE 50 MG/G
1 PATCH TOPICAL DAILY
Qty: 30 PATCH | Refills: 0 | Status: SHIPPED | OUTPATIENT
Start: 2022-08-06

## 2022-08-06 RX ORDER — ACETAMINOPHEN 325 MG/1
650 TABLET ORAL EVERY 6 HOURS PRN
Qty: 30 TABLET | Refills: 0 | Status: SHIPPED | OUTPATIENT
Start: 2022-08-06

## 2022-08-06 RX ADMIN — DIAZEPAM 5 MG: 5 TABLET ORAL at 02:32

## 2022-08-06 RX ADMIN — ACETAMINOPHEN 325MG 975 MG: 325 TABLET ORAL at 02:32

## 2022-08-06 NOTE — ED PROVIDER NOTES
History  Chief Complaint   Patient presents with    Back Pain     Pt reports lower back pain for the past 2-3 days, denies injury/trauma  Reports lower back pain that radiates down right leg  Denies loss of bowel/bladder  Patient is a 26-year-old female with past medical history of HTN and GERD presenting for evaluation of 3 days of lower back pain radiating down her right leg  She notes she first developed this pain approximately 6 months ago and has had it intermittently about 1 time per month  She takes her arthritis medication when this pain evolves and it typically it resolves  She notes the last 3 days the pain has been 6-8/10, constant, aching, stiffness, shooting, worse with movement, relieved by lying flat, and minimally improved by her "arthritis medication"  She denies fevers, chills, weight loss, night sweats, CP/SOB, abdominal pain, saddle anesthesia, urinary/bowel incontinence, urinary retention, and numbness and tingling to the distal extremities         History provided by:  Patient   used: No    Back Pain  Location:  Lumbar spine  Quality:  Aching, shooting and stiffness  Stiffness is present:  All day  Radiates to:  R posterior upper leg  Pain severity:  Moderate  Pain is:  Unable to specify  Duration:  3 days  Timing:  Constant  Progression:  Waxing and waning  Chronicity:  Recurrent  Context: not emotional stress, not falling, not jumping from heights, not lifting heavy objects, not MCA, not MVA, not pedestrian accident, not physical stress, not recent illness, not recent injury and not twisting    Relieved by:  Being still and lying down  Worsened by:  Sitting, bending, movement and twisting  Ineffective treatments:  Bed rest, being still and OTC medications  Associated symptoms: leg pain    Associated symptoms: no abdominal pain, no abdominal swelling, no bladder incontinence, no bowel incontinence, no chest pain, no dysuria, no fever, no headaches, no numbness, no paresthesias, no perianal numbness, no tingling and no weakness    Risk factors: menopause and obesity    Risk factors: no hx of cancer, no hx of osteoporosis, no lack of exercise, no recent surgery, no steroid use and no vascular disease        Prior to Admission Medications   Prescriptions Last Dose Informant Patient Reported? Taking?    Blood Pressure Monitoring (Blood Pressure Cuff) MISC  Self No No   Sig: Use 2 (two) times a day Omron, Upper arm, large cuff   amLODIPine (NORVASC) 5 mg tablet  Self No No   Sig: Take 1 tablet (5 mg total) by mouth daily   celecoxib (CeleBREX) 200 mg capsule   No No   Sig: Take 1 capsule (200 mg total) by mouth 2 (two) times a day   hydrochlorothiazide (HYDRODIURIL) 25 mg tablet  Self No No   Sig: Take 1 tablet (25 mg total) by mouth daily   lisinopril (ZESTRIL) 40 mg tablet  Self No No   Sig: Take 1 tablet (40 mg total) by mouth daily   oxyCODONE-acetaminophen (PERCOCET) 5-325 mg per tablet  Self No No   Sig: Take 1 tablet by mouth every 4 (four) hours as needed for severe pain for up to 15 doses Max Daily Amount: 6 tablets   Patient not taking: No sig reported      Facility-Administered Medications: None       Past Medical History:   Diagnosis Date    Asthma     Breathing difficulty     post issues with difficulty breathing    GERD (gastroesophageal reflux disease)     Hypertension     Lumbar radiculopathy 2016       Past Surgical History:   Procedure Laterality Date    BREAST BIOPSY Left 06/10/2021    us bx- neg     SECTION      CYSTOSCOPY N/A 2021    Procedure: CYSTOSCOPY;  Surgeon: Beka Rodgers DO;  Location: [facility];  Service: Gynecology    NM LAP, SUPRACERVIAL HYSTERECTOMY W/ TUBE&OV, <250G N/A 2021    Procedure: 18 [facility] WITH B/L SALPINGECTOMY;  Surgeon: Beka Rodgers DO;  Location: [facility];  Service: Gynecology    REDUCTION MAMMAPLASTY Bilateral 2016    US GUIDED BREAST BIOPSY LEFT COMPLETE Left 6/10/2021       Family History Problem Relation Age of Onset    Hypertension Mother     Diabetes Father     No Known Problems Sister     No Known Problems Daughter     No Known Problems Maternal Grandmother     No Known Problems Maternal Grandfather     No Known Problems Paternal Grandmother     No Known Problems Paternal Grandfather     No Known Problems Sister     No Known Problems Sister     No Known Problems Sister     No Known Problems Sister     No Known Problems Daughter     No Known Problems Daughter     No Known Problems Paternal Aunt     No Known Problems Son     No Known Problems Son      I have reviewed and agree with the history as documented  E-Cigarette/Vaping    E-Cigarette Use Never User      E-Cigarette/Vaping Substances    Nicotine No     THC No     CBD No     Flavoring No     Other No     Unknown No      Social History     Tobacco Use    Smoking status: Never Smoker    Smokeless tobacco: Never Used   Vaping Use    Vaping Use: Never used   Substance Use Topics    Alcohol use: Yes     Comment: occational     Drug use: Never     Comment: occassional wine       Review of Systems   Constitutional: Negative for chills and fever  HENT: Negative for ear pain and sore throat  Eyes: Negative for pain and visual disturbance  Respiratory: Negative for cough and shortness of breath  Cardiovascular: Negative for chest pain and palpitations  Gastrointestinal: Negative for abdominal pain, bowel incontinence and vomiting  Genitourinary: Negative for bladder incontinence, dysuria and hematuria  Musculoskeletal: Positive for back pain  Negative for arthralgias, gait problem, joint swelling, myalgias, neck pain and neck stiffness  Skin: Negative for color change and rash  Neurological: Negative for tingling, seizures, syncope, weakness, numbness, headaches and paresthesias  All other systems reviewed and are negative  Physical Exam  Physical Exam  Vitals and nursing note reviewed  Constitutional:       General: She is awake  She is not in acute distress  Appearance: Normal appearance  She is well-developed and normal weight  She is not ill-appearing or toxic-appearing  Interventions: She is not intubated  HENT:      Head: Normocephalic and atraumatic  Nose: Nose normal       Mouth/Throat:      Lips: Pink  No lesions  Mouth: Mucous membranes are moist       Pharynx: Oropharynx is clear  Eyes:      Conjunctiva/sclera: Conjunctivae normal    Neck:      Trachea: Trachea and phonation normal    Cardiovascular:      Rate and Rhythm: Normal rate  Pulses: Normal pulses  Radial pulses are 2+ on the right side and 2+ on the left side  Dorsalis pedis pulses are 2+ on the right side and 2+ on the left side  Posterior tibial pulses are 2+ on the right side and 2+ on the left side  Heart sounds: Normal heart sounds, S1 normal and S2 normal  No murmur heard  Pulmonary:      Effort: Pulmonary effort is normal  No tachypnea, respiratory distress or retractions  She is not intubated  Breath sounds: Normal breath sounds and air entry  No stridor, decreased air movement or transmitted upper airway sounds  No decreased breath sounds  Abdominal:      Palpations: Abdomen is soft  Tenderness: There is no abdominal tenderness  There is no right CVA tenderness, left CVA tenderness, guarding or rebound  Negative signs include Cadena's sign, Rovsing's sign and McBurney's sign  Musculoskeletal:         General: No deformity or signs of injury  Cervical back: Normal, full passive range of motion without pain, normal range of motion and neck supple  No rigidity or bony tenderness  No pain with movement  Normal range of motion  Thoracic back: Normal  No swelling, edema, signs of trauma, tenderness or bony tenderness  Normal range of motion  Lumbar back: Spasms and tenderness present   No swelling, edema, signs of trauma or bony tenderness  Normal range of motion  Positive left straight leg raise test  Negative right straight leg raise test         Back:       Right hip: Normal       Left hip: Normal       Right knee: Normal       Left knee: Normal       Right lower leg: Normal  No edema  Left lower leg: Normal  No edema  Right ankle: Normal       Left ankle: Normal       Right foot: Normal       Comments: 5/5 strength throughout, sensation intact, ESPINOZA  Pt slow to sit up during examination of gait and ROM  Muscle spasm to right paraspinal muscles   No midline tenderness  No focal joint swelling   Skin:     General: Skin is warm and dry  Capillary Refill: Capillary refill takes less than 2 seconds  Neurological:      General: No focal deficit present  Mental Status: She is alert and oriented to person, place, and time  Mental status is at baseline  Psychiatric:         Behavior: Behavior is cooperative           Vital Signs  ED Triage Vitals [08/06/22 0002]   Temperature Pulse Respirations Blood Pressure SpO2   98 3 °F (36 8 °C) 85 20 151/80 100 %      Temp Source Heart Rate Source Patient Position - Orthostatic VS BP Location FiO2 (%)   Oral Monitor Sitting Right arm --      Pain Score       10 - Worst Possible Pain           Vitals:    08/06/22 0002 08/06/22 0233   BP: 151/80 (!) 170/104   Pulse: 85 79   Patient Position - Orthostatic VS: Sitting Sitting         Visual Acuity      ED Medications  Medications   acetaminophen (TYLENOL) tablet 975 mg (975 mg Oral Given 8/6/22 0232)   diazepam (VALIUM) tablet 5 mg (5 mg Oral Given 8/6/22 0232)       Diagnostic Studies  Results Reviewed     None                 No orders to display              Procedures  Procedures         ED Course                                             MDM  Number of Diagnoses or Management Options  Lumbar radiculopathy: established and worsening     Amount and/or Complexity of Data Reviewed  Decide to obtain previous medical records or to obtain history from someone other than the patient: yes  Review and summarize past medical records: yes    Risk of Complications, Morbidity, and/or Mortality  General comments: Assessment:  Patient is a 67-year-old female presenting for evaluation of 3 days lower back pain  She evidences discomfort on exam, but does not have red flag signs including midline spinal tenderness, fever, chills, night sweats, unintentional weight loss, no active or hx of IV recreational drugs, no hx of malignancy, no hx of trauma  Given the pain is worse with palpation and movement, I suspect a musculoskeletal origin  She describes a shooting/stabbing radiation of pain down her right leg making a radiculopathy more likely  I instructed the pt to take the prescribed medications to treat her pain and placed an ambulatory referral for comp spine  She is well-appearing and ambulatory with a steady gait  Strict return precautions were reviewed including fever, chills, unintentional weight loss, numbness to the perineum, bowel/bladder incontinence, urinary retention, and numbness/tingling to the distal extremities  Patient Progress  Patient progress: improved      Disposition  Final diagnoses:   Lumbar radiculopathy     Time reflects when diagnosis was documented in both MDM as applicable and the Disposition within this note     Time User Action Codes Description Comment    8/6/2022  2:24 AM Monet Rhodes Add [M54 16] Lumbar radiculopathy       ED Disposition     ED Disposition   Discharge    Condition   Stable    Date/Time   Sat Aug 6, 2022  2:24 AM    Comment   Esther Gonzalez discharge to home/self care                 Follow-up Information     Follow up With Specialties Details Why Contact Info Additional Information    Margaret Prado MD Internal Medicine Schedule an appointment as soon as possible for a visit in 1 week  [address] [address] [phone] [address] [facility] Emergency Department Emergency Medicine Go to  If symptoms worsen [place] [id number]  [address] [facility] Emergency Department, [address] [facility] [address]  , [place], [place], [place] [address] Program Physical Therapy Call   [phone]          Discharge Medication List as of 8/6/2022  2:30 AM      START taking these medications    Details   acetaminophen (TYLENOL) 325 mg tablet Take 2 tablets (650 mg total) by mouth every 6 (six) hours as needed for mild pain, Starting Sat 8/6/2022, Normal      lidocaine (Lidoderm) 5 % Apply 1 patch topically daily Remove & Discard patch within 12 hours or as directed by MD, Starting Sat 8/6/2022, Normal      methocarbamol (ROBAXIN) 500 mg tablet Take 1 tablet (500 mg total) by mouth 2 (two) times a day, Starting Sat 8/6/2022, Normal         CONTINUE these medications which have NOT CHANGED    Details   amLODIPine (NORVASC) 5 mg tablet Take 1 tablet (5 mg total) by mouth daily, Starting Mon 2/28/2022, Normal      Blood Pressure Monitoring (Blood Pressure Cuff) MISC Use 2 (two) times a day Omron, Upper arm, large cuff, Starting Mon 4/5/2021, Print      celecoxib (CeleBREX) 200 mg capsule Take 1 capsule (200 mg total) by mouth 2 (two) times a day, Starting Tue 5/31/2022, Normal      hydrochlorothiazide (HYDRODIURIL) 25 mg tablet Take 1 tablet (25 mg total) by mouth daily, Starting Mon 2/28/2022, Normal      lisinopril (ZESTRIL) 40 mg tablet Take 1 tablet (40 mg total) by mouth daily, Starting Mon 2/28/2022, Normal      oxyCODONE-acetaminophen (PERCOCET) 5-325 mg per tablet Take 1 tablet by mouth every 4 (four) hours as needed for severe pain for up to 15 doses Max Daily Amount: 6 tablets, Starting Mon 12/6/2021, Normal                 PDMP Review     None          ED Provider  Electronically Signed by           John Dietrich  08/06/22 9246

## 2022-08-06 NOTE — DISCHARGE INSTRUCTIONS
Take the prescribed medications as directed to treat your pain  You will receive a call in the coming days from our comprehensive spine center which is a phone triage system for evaluation of need for physical therapy  I have provided their number in your paperwork in the case that you do not receive a call in the coming days  Schedule a follow up appointment with your primary care provider for re-evaluation  Return to the ED if you develop fevers, chills, chest pain, shortness of breath, abdominal pain, vomiting, blood in stool, numbness to your private parts, bowel/bladder incontinence (urinating or stooling on yourself), or if you develop numbness/tingling to the distal extremities

## 2022-08-08 ENCOUNTER — TELEPHONE (OUTPATIENT)
Dept: PHYSICAL THERAPY | Facility: OTHER | Age: 52
End: 2022-08-08

## 2022-08-17 ENCOUNTER — OFFICE VISIT (OUTPATIENT)
Dept: FAMILY MEDICINE CLINIC | Facility: CLINIC | Age: 52
End: 2022-08-17

## 2022-08-17 VITALS
DIASTOLIC BLOOD PRESSURE: 110 MMHG | BODY MASS INDEX: 37.7 KG/M2 | WEIGHT: 212.8 LBS | OXYGEN SATURATION: 99 % | HEART RATE: 88 BPM | RESPIRATION RATE: 12 BRPM | HEIGHT: 63 IN | SYSTOLIC BLOOD PRESSURE: 150 MMHG

## 2022-08-17 DIAGNOSIS — E66.9 CLASS 2 OBESITY WITH BODY MASS INDEX (BMI) OF 36.0 TO 36.9 IN ADULT, UNSPECIFIED OBESITY TYPE, UNSPECIFIED WHETHER SERIOUS COMORBIDITY PRESENT: ICD-10-CM

## 2022-08-17 DIAGNOSIS — I10 ESSENTIAL HYPERTENSION: ICD-10-CM

## 2022-08-17 DIAGNOSIS — E55.9 VITAMIN D DEFICIENCY: ICD-10-CM

## 2022-08-17 DIAGNOSIS — Z23 NEED FOR TETANUS BOOSTER: Primary | ICD-10-CM

## 2022-08-17 DIAGNOSIS — E78.2 MIXED HYPERLIPIDEMIA: ICD-10-CM

## 2022-08-17 DIAGNOSIS — M54.32 LEFT SIDED SCIATICA: ICD-10-CM

## 2022-08-17 PROCEDURE — 99214 OFFICE O/P EST MOD 30 MIN: CPT | Performed by: INTERNAL MEDICINE

## 2022-08-17 RX ORDER — CARVEDILOL 12.5 MG/1
12.5 TABLET ORAL 2 TIMES DAILY WITH MEALS
Qty: 60 TABLET | Refills: 0 | Status: SHIPPED | OUTPATIENT
Start: 2022-08-17 | End: 2022-08-31

## 2022-08-17 RX ORDER — PEN NEEDLE, DIABETIC 32GX 5/32"
NEEDLE, DISPOSABLE MISCELLANEOUS
Qty: 100 EACH | Refills: 3 | Status: SHIPPED | OUTPATIENT
Start: 2022-08-17

## 2022-08-17 NOTE — ASSESSMENT & PLAN NOTE
Continue with low-carbohydrate diet and regular exercise  Will start on Saxenda 0 6 mg with recommended titration  Side effects discussed

## 2022-08-17 NOTE — ASSESSMENT & PLAN NOTE
Blood pressure is on controlled right now  Likely due to patient being in pain and due to recent use of NSAIDs  She was instructed to avoid NSAIDs  Continue with Tylenol  She developed side effects to amlodipine inform of lower extremity swelling  It will be discontinued  Continue lisinopril 40 mg at night and hydrochlorothiazide 25 mg in the morning  Will add carvedilol 12 5 mg b i d  She will update me on her blood pressure readings in few weeks  Will recheck kidney function and electrolytes

## 2022-08-17 NOTE — ASSESSMENT & PLAN NOTE
Overall it is improving, she is currently on methocarbamol  She will try to avoid NSAIDs due to blood pressure being on the higher side  No problems with urination or defecation  No weakness or paresthesia

## 2022-08-17 NOTE — TELEPHONE ENCOUNTER
Message left for patient regarding referral entered for  [facility]      Contact information, hours of operation and VM instructions left  Nurse requested patient to CB if needed and leave Full Name &  on VM       Referral deferred for f/u  per protocol

## 2022-08-17 NOTE — PATIENT INSTRUCTIONS
Start Saxenda 0 6 mg daily  increase dose by 0 6 mg every week  Stop amlodipine for blood pressure, start carvedilol 1 pill twice a day  Avoid ibuprofen, for pain use Tylenol

## 2022-08-17 NOTE — PROGRESS NOTES
Assessment/Plan:    Class 2 obesity with body mass index (BMI) of 36 0 to 36 9 in adult  Continue with low-carbohydrate diet and regular exercise  Will start on Saxenda 0 6 mg with recommended titration  Side effects discussed  Essential hypertension  Blood pressure is on controlled right now  Likely due to patient being in pain and due to recent use of NSAIDs  She was instructed to avoid NSAIDs  Continue with Tylenol  She developed side effects to amlodipine inform of lower extremity swelling  It will be discontinued  Continue lisinopril 40 mg at night and hydrochlorothiazide 25 mg in the morning  Will add carvedilol 12 5 mg b i d  She will update me on her blood pressure readings in few weeks  Will recheck kidney function and electrolytes  Left sided sciatica  Overall it is improving, she is currently on methocarbamol  She will try to avoid NSAIDs due to blood pressure being on the higher side  No problems with urination or defecation  No weakness or paresthesia  Diagnoses and all orders for this visit:    Need for tetanus booster    Left sided sciatica    Essential hypertension  -     carvedilol (COREG) 12 5 mg tablet; Take 1 tablet (12 5 mg total) by mouth 2 (two) times a day with meals  -     Comprehensive metabolic panel; Future  -     TSH, 3rd generation with Free T4 reflex; Future  -     CBC and differential; Future  -     UA (URINE) with reflex to Scope    Class 2 obesity with body mass index (BMI) of 36 0 to 36 9 in adult, unspecified obesity type, unspecified whether serious comorbidity present  -     liraglutide (SAXENDA) injection; Inject 0 1 mL (0 6 mg total) under the skin daily Use 0 6 mg every day, you can increase dose by 0 6 mg every 7 days until maximum dose of 3 mg or until side effects   -     Insulin Pen Needle (BD Pen Needle Rajani U/F) 32G X 4 MM MISC; Use daily as directed with insulin pen  -     HEMOGLOBIN A1C W/ EAG ESTIMATION;  Future    Mixed hyperlipidemia  - Lipid panel; Future    Vitamin D deficiency  -     Vitamin D 25 hydroxy; Future          Subjective:      Patient ID: Any Gonzalez is a 45 y o  female  Patient came today for follow-up on her chronic problems including obesity, hypertension, low back pain  The following portions of the patient's history were reviewed and updated as appropriate: allergies, current medications, past family history, past medical history, past social history, past surgical history, and problem list     Review of Systems   Constitutional: Negative for activity change, appetite change, chills, fatigue and fever  HENT: Negative for congestion, ear pain, rhinorrhea and sore throat  Respiratory: Negative for cough, shortness of breath and wheezing  Cardiovascular: Negative for chest pain, palpitations and leg swelling  Gastrointestinal: Negative for abdominal distention, abdominal pain, diarrhea, nausea and vomiting  Genitourinary: Negative for difficulty urinating, frequency and pelvic pain  Musculoskeletal: Positive for back pain  Negative for arthralgias and neck pain  Skin: Negative for rash  Neurological: Negative for dizziness, tremors, weakness, numbness and headaches           Objective:      BP (!) 150/110 (BP Location: Left arm, Patient Position: Sitting, Cuff Size: Standard)   Pulse 88   Resp 12   Ht 5' 2 5" (1 588 m)   Wt 96 5 kg (212 lb 12 8 oz)   LMP 04/11/2021   SpO2 99%   BMI 38 30 kg/m²     Allergies   Allergen Reactions    Aspirin Other (See Comments)     Reaction Date: 17Apr2012;   Chest tight    Calcium Carbonate      Reaction Date: 17Apr2012;     Ibuprofen      Reaction Date: 17Apr2012;     Penicillins Swelling          Current Outpatient Medications:     acetaminophen (TYLENOL) 325 mg tablet, Take 2 tablets (650 mg total) by mouth every 6 (six) hours as needed for mild pain, Disp: 30 tablet, Rfl: 0    Blood Pressure Monitoring (Blood Pressure Cuff) MISC, Use 2 (two) times a day Omron, Upper arm, large cuff, Disp: 1 each, Rfl: 0    carvedilol (COREG) 12 5 mg tablet, Take 1 tablet (12 5 mg total) by mouth 2 (two) times a day with meals, Disp: 60 tablet, Rfl: 0    hydrochlorothiazide (HYDRODIURIL) 25 mg tablet, Take 1 tablet (25 mg total) by mouth daily, Disp: 90 tablet, Rfl: 1    Insulin Pen Needle (BD Pen Needle Rajani U/F) 32G X 4 MM MISC, Use daily as directed with insulin pen, Disp: 100 each, Rfl: 3    lidocaine (Lidoderm) 5 %, Apply 1 patch topically daily Remove & Discard patch within 12 hours or as directed by MD, Disp: 30 patch, Rfl: 0    liraglutide (SAXENDA) injection, Inject 0 1 mL (0 6 mg total) under the skin daily Use 0 6 mg every day, you can increase dose by 0 6 mg every 7 days until maximum dose of 3 mg or until side effects  , Disp: 3 mL, Rfl: 1    lisinopril (ZESTRIL) 40 mg tablet, Take 1 tablet (40 mg total) by mouth daily, Disp: 90 tablet, Rfl: 0    methocarbamol (ROBAXIN) 500 mg tablet, Take 1 tablet (500 mg total) by mouth 2 (two) times a day, Disp: 20 tablet, Rfl: 0    oxyCODONE-acetaminophen (PERCOCET) 5-325 mg per tablet, Take 1 tablet by mouth every 4 (four) hours as needed for severe pain for up to 15 doses Max Daily Amount: 6 tablets, Disp: 15 tablet, Rfl: 0     Patient Instructions   Start Saxenda 0 6 mg daily  increase dose by 0 6 mg every week  Stop amlodipine for blood pressure, start carvedilol 1 pill twice a day  Avoid ibuprofen, for pain use Tylenol  Physical Exam  Constitutional:       General: She is not in acute distress  Appearance: She is obese  She is not ill-appearing or toxic-appearing  Cardiovascular:      Pulses: Normal pulses  Pulmonary:      Effort: Pulmonary effort is normal    Abdominal:      General: Abdomen is flat  Musculoskeletal:         General: Tenderness present  No swelling  Neurological:      Mental Status: She is alert  Sensory: No sensory deficit  Motor: No weakness        Gait: Gait normal

## 2022-08-19 ENCOUNTER — APPOINTMENT (OUTPATIENT)
Dept: LAB | Facility: MEDICAL CENTER | Age: 52
End: 2022-08-19

## 2022-08-19 DIAGNOSIS — E78.2 MIXED HYPERLIPIDEMIA: ICD-10-CM

## 2022-08-19 DIAGNOSIS — I10 ESSENTIAL HYPERTENSION: ICD-10-CM

## 2022-08-19 DIAGNOSIS — E55.9 VITAMIN D DEFICIENCY: ICD-10-CM

## 2022-08-19 DIAGNOSIS — E66.9 CLASS 2 OBESITY WITH BODY MASS INDEX (BMI) OF 36.0 TO 36.9 IN ADULT, UNSPECIFIED OBESITY TYPE, UNSPECIFIED WHETHER SERIOUS COMORBIDITY PRESENT: ICD-10-CM

## 2022-08-19 LAB
25(OH)D3 SERPL-MCNC: 26.2 NG/ML (ref 30–100)
ALBUMIN SERPL BCP-MCNC: 3.5 G/DL (ref 3.5–5)
ALP SERPL-CCNC: 72 U/L (ref 46–116)
ALT SERPL W P-5'-P-CCNC: 21 U/L (ref 12–78)
ANION GAP SERPL CALCULATED.3IONS-SCNC: 5 MMOL/L (ref 4–13)
AST SERPL W P-5'-P-CCNC: 19 U/L (ref 5–45)
BASOPHILS # BLD AUTO: 0.02 THOUSANDS/ΜL (ref 0–0.1)
BASOPHILS NFR BLD AUTO: 0 % (ref 0–1)
BILIRUB SERPL-MCNC: 0.33 MG/DL (ref 0.2–1)
BILIRUB UR QL STRIP: NEGATIVE
BUN SERPL-MCNC: 10 MG/DL (ref 5–25)
CALCIUM SERPL-MCNC: 9.7 MG/DL (ref 8.3–10.1)
CHLORIDE SERPL-SCNC: 104 MMOL/L (ref 96–108)
CHOLEST SERPL-MCNC: 198 MG/DL
CLARITY UR: CLEAR
CO2 SERPL-SCNC: 25 MMOL/L (ref 21–32)
COLOR UR: COLORLESS
CREAT SERPL-MCNC: 0.98 MG/DL (ref 0.6–1.3)
EOSINOPHIL # BLD AUTO: 0.14 THOUSAND/ΜL (ref 0–0.61)
EOSINOPHIL NFR BLD AUTO: 2 % (ref 0–6)
ERYTHROCYTE [DISTWIDTH] IN BLOOD BY AUTOMATED COUNT: 15.2 % (ref 11.6–15.1)
EST. AVERAGE GLUCOSE BLD GHB EST-MCNC: 114 MG/DL
GFR SERPL CREATININE-BSD FRML MDRD: 66 ML/MIN/1.73SQ M
GLUCOSE P FAST SERPL-MCNC: 78 MG/DL (ref 65–99)
GLUCOSE UR STRIP-MCNC: NEGATIVE MG/DL
HBA1C MFR BLD: 5.6 %
HCT VFR BLD AUTO: 40.5 % (ref 34.8–46.1)
HDLC SERPL-MCNC: 49 MG/DL
HGB BLD-MCNC: 13.6 G/DL (ref 11.5–15.4)
HGB UR QL STRIP.AUTO: NEGATIVE
IMM GRANULOCYTES # BLD AUTO: 0.02 THOUSAND/UL (ref 0–0.2)
IMM GRANULOCYTES NFR BLD AUTO: 0 % (ref 0–2)
KETONES UR STRIP-MCNC: NEGATIVE MG/DL
LDLC SERPL CALC-MCNC: 132 MG/DL (ref 0–100)
LEUKOCYTE ESTERASE UR QL STRIP: NEGATIVE
LYMPHOCYTES # BLD AUTO: 2.38 THOUSANDS/ΜL (ref 0.6–4.47)
LYMPHOCYTES NFR BLD AUTO: 36 % (ref 14–44)
MCH RBC QN AUTO: 31 PG (ref 26.8–34.3)
MCHC RBC AUTO-ENTMCNC: 33.6 G/DL (ref 31.4–37.4)
MCV RBC AUTO: 92 FL (ref 82–98)
MONOCYTES # BLD AUTO: 0.56 THOUSAND/ΜL (ref 0.17–1.22)
MONOCYTES NFR BLD AUTO: 9 % (ref 4–12)
NEUTROPHILS # BLD AUTO: 3.48 THOUSANDS/ΜL (ref 1.85–7.62)
NEUTS SEG NFR BLD AUTO: 53 % (ref 43–75)
NITRITE UR QL STRIP: NEGATIVE
NONHDLC SERPL-MCNC: 149 MG/DL
NRBC BLD AUTO-RTO: 0 /100 WBCS
PH UR STRIP.AUTO: 6.5 [PH]
PLATELET # BLD AUTO: 337 THOUSANDS/UL (ref 149–390)
PMV BLD AUTO: 11.2 FL (ref 8.9–12.7)
POTASSIUM SERPL-SCNC: 4.3 MMOL/L (ref 3.5–5.3)
PROT SERPL-MCNC: 7.6 G/DL (ref 6.4–8.4)
PROT UR STRIP-MCNC: NEGATIVE MG/DL
RBC # BLD AUTO: 4.39 MILLION/UL (ref 3.81–5.12)
SODIUM SERPL-SCNC: 134 MMOL/L (ref 135–147)
SP GR UR STRIP.AUTO: 1 (ref 1–1.03)
TRIGL SERPL-MCNC: 85 MG/DL
TSH SERPL DL<=0.05 MIU/L-ACNC: 1.47 UIU/ML (ref 0.45–4.5)
UROBILINOGEN UR STRIP-ACNC: <2 MG/DL
WBC # BLD AUTO: 6.6 THOUSAND/UL (ref 4.31–10.16)

## 2022-08-19 PROCEDURE — 36415 COLL VENOUS BLD VENIPUNCTURE: CPT

## 2022-08-19 PROCEDURE — 84443 ASSAY THYROID STIM HORMONE: CPT

## 2022-08-19 PROCEDURE — 82306 VITAMIN D 25 HYDROXY: CPT

## 2022-08-19 PROCEDURE — 83036 HEMOGLOBIN GLYCOSYLATED A1C: CPT

## 2022-08-19 PROCEDURE — 80053 COMPREHEN METABOLIC PANEL: CPT

## 2022-08-19 PROCEDURE — 80061 LIPID PANEL: CPT

## 2022-08-19 PROCEDURE — 85025 COMPLETE CBC W/AUTO DIFF WBC: CPT

## 2022-08-19 PROCEDURE — 81003 URINALYSIS AUTO W/O SCOPE: CPT | Performed by: INTERNAL MEDICINE

## 2022-08-29 DIAGNOSIS — I10 ESSENTIAL HYPERTENSION: ICD-10-CM

## 2022-08-29 DIAGNOSIS — E87.1 HYPONATREMIA: Primary | ICD-10-CM

## 2022-08-29 RX ORDER — HYDROCHLOROTHIAZIDE 25 MG/1
25 TABLET ORAL DAILY
Qty: 90 TABLET | Refills: 1 | Status: SHIPPED | OUTPATIENT
Start: 2022-08-29

## 2022-08-31 DIAGNOSIS — I10 ESSENTIAL HYPERTENSION: ICD-10-CM

## 2022-08-31 RX ORDER — CARVEDILOL 12.5 MG/1
TABLET ORAL
Qty: 180 TABLET | Refills: 1 | Status: SHIPPED | OUTPATIENT
Start: 2022-08-31

## 2022-09-09 ENCOUNTER — APPOINTMENT (OUTPATIENT)
Dept: LAB | Facility: MEDICAL CENTER | Age: 52
End: 2022-09-09

## 2022-09-09 DIAGNOSIS — E87.1 HYPONATREMIA: ICD-10-CM

## 2022-09-09 LAB
ANION GAP SERPL CALCULATED.3IONS-SCNC: 4 MMOL/L (ref 4–13)
BUN SERPL-MCNC: 16 MG/DL (ref 5–25)
CALCIUM SERPL-MCNC: 9.8 MG/DL (ref 8.3–10.1)
CHLORIDE SERPL-SCNC: 110 MMOL/L (ref 96–108)
CO2 SERPL-SCNC: 26 MMOL/L (ref 21–32)
CREAT SERPL-MCNC: 0.98 MG/DL (ref 0.6–1.3)
GFR SERPL CREATININE-BSD FRML MDRD: 66 ML/MIN/1.73SQ M
GLUCOSE P FAST SERPL-MCNC: 86 MG/DL (ref 65–99)
POTASSIUM SERPL-SCNC: 4.2 MMOL/L (ref 3.5–5.3)
SODIUM SERPL-SCNC: 140 MMOL/L (ref 135–147)

## 2022-09-09 PROCEDURE — 80048 BASIC METABOLIC PNL TOTAL CA: CPT

## 2022-09-09 PROCEDURE — 36415 COLL VENOUS BLD VENIPUNCTURE: CPT

## 2023-06-21 ENCOUNTER — TELEPHONE (OUTPATIENT)
Dept: ADMINISTRATIVE | Facility: OTHER | Age: 53
End: 2023-06-21

## 2023-06-21 NOTE — TELEPHONE ENCOUNTER
----- Message from Dayo Wong sent at 6/21/2023 10:06 AM EDT -----  Regarding: care gap request  06/21/23 10:06 AM    Hello, our patient attached above has had Mammogram completed/performed  Please assist in updating the patient chart by pulling the Care Everywhere (CE) document  The date of service is 6/20/23       Thank you,  Dayo Wong  [facility] [address]

## 2023-06-21 NOTE — TELEPHONE ENCOUNTER
Upon review of the In Basket request we were able to locate, review, and update the patient chart as requested for Mammogram     Any additional questions or concerns should be emailed to the Practice Liaisons via the appropriate education email address, please do not reply via In Basket      Thank you  Tori Lopez MA

## 2023-10-13 ENCOUNTER — APPOINTMENT (OUTPATIENT)
Dept: RADIOLOGY | Age: 53
End: 2023-10-13

## 2023-10-13 ENCOUNTER — OCCMED (OUTPATIENT)
Dept: URGENT CARE | Age: 53
End: 2023-10-13

## 2023-10-13 DIAGNOSIS — Z02.1 PHYSICAL EXAM, PRE-EMPLOYMENT: ICD-10-CM

## 2023-10-13 DIAGNOSIS — Z02.1 PHYSICAL EXAM, PRE-EMPLOYMENT: Primary | ICD-10-CM

## 2023-10-13 PROCEDURE — 71045 X-RAY EXAM CHEST 1 VIEW: CPT

## 2023-11-28 ENCOUNTER — TELEPHONE (OUTPATIENT)
Dept: FAMILY MEDICINE CLINIC | Facility: CLINIC | Age: 53
End: 2023-11-28

## 2023-11-28 NOTE — TELEPHONE ENCOUNTER
On 6/13/2023 patient established care with Angela Conklin DO   [address] [place] [address]   Phone: [phone]   Fax: [phone]

## 2023-11-29 NOTE — TELEPHONE ENCOUNTER
11/29/23 4:24 PM        The office's request has been received, reviewed, and the patient chart updated. The PCP has successfully been removed with a patient attribution note. This message will now be completed.         Thank you  Indio Acevedo

## 2024-02-19 LAB
EXTERNAL HIV CONFIRMATION: NORMAL
HCV AB SER-ACNC: NEGATIVE

## 2025-01-22 ENCOUNTER — APPOINTMENT (EMERGENCY)
Dept: RADIOLOGY | Facility: HOSPITAL | Age: 55
End: 2025-01-22
Payer: COMMERCIAL

## 2025-01-22 ENCOUNTER — APPOINTMENT (EMERGENCY)
Dept: CT IMAGING | Facility: HOSPITAL | Age: 55
End: 2025-01-22
Payer: COMMERCIAL

## 2025-01-22 ENCOUNTER — HOSPITAL ENCOUNTER (EMERGENCY)
Facility: HOSPITAL | Age: 55
Discharge: HOME | End: 2025-01-22
Attending: EMERGENCY MEDICINE | Admitting: EMERGENCY MEDICINE
Payer: COMMERCIAL

## 2025-01-22 VITALS
HEART RATE: 104 BPM | RESPIRATION RATE: 18 BRPM | BODY MASS INDEX: 36.51 KG/M2 | OXYGEN SATURATION: 99 % | TEMPERATURE: 97.9 F | WEIGHT: 202.82 LBS | SYSTOLIC BLOOD PRESSURE: 146 MMHG | DIASTOLIC BLOOD PRESSURE: 89 MMHG

## 2025-01-22 DIAGNOSIS — R10.9 ABDOMINAL PAIN: Primary | ICD-10-CM

## 2025-01-22 DIAGNOSIS — I10 HTN (HYPERTENSION): ICD-10-CM

## 2025-01-22 DIAGNOSIS — N83.8 OVARIAN MASS: ICD-10-CM

## 2025-01-22 PROBLEM — N94.89 ADNEXAL MASS: Status: ACTIVE | Noted: 2025-01-22

## 2025-01-22 PROBLEM — D64.9 ANEMIA: Status: RESOLVED | Noted: 2021-04-05 | Resolved: 2025-01-22

## 2025-01-22 LAB
ALBUMIN SERPL BCG-MCNC: 4.2 G/DL (ref 3.5–5)
ALP SERPL-CCNC: 74 U/L (ref 34–104)
ALT SERPL W P-5'-P-CCNC: 11 U/L (ref 7–52)
ANION GAP SERPL CALCULATED.3IONS-SCNC: 7 MMOL/L (ref 4–13)
APTT PPP: 29 SECONDS (ref 23–34)
AST SERPL W P-5'-P-CCNC: 15 U/L (ref 13–39)
ATRIAL RATE: 93 BPM
BASOPHILS # BLD AUTO: 0.03 THOUSANDS/ΜL (ref 0–0.1)
BASOPHILS NFR BLD AUTO: 0 % (ref 0–1)
BILIRUB SERPL-MCNC: 0.29 MG/DL (ref 0.2–1)
BILIRUB UR QL STRIP: NEGATIVE
BUN SERPL-MCNC: 11 MG/DL (ref 5–25)
CALCIUM SERPL-MCNC: 9.9 MG/DL (ref 8.4–10.2)
CANCER AG125 SERPL-ACNC: 182.5 U/ML (ref 0–35)
CARDIAC TROPONIN I PNL SERPL HS: <2 NG/L (ref ?–50)
CEA SERPL-MCNC: 18.2 NG/ML (ref 0–3)
CHLORIDE SERPL-SCNC: 96 MMOL/L (ref 96–108)
CLARITY UR: CLEAR
CO2 SERPL-SCNC: 29 MMOL/L (ref 21–32)
COLOR UR: YELLOW
CREAT SERPL-MCNC: 0.97 MG/DL (ref 0.6–1.3)
EOSINOPHIL # BLD AUTO: 0.18 THOUSAND/ΜL (ref 0–0.61)
EOSINOPHIL NFR BLD AUTO: 2 % (ref 0–6)
ERYTHROCYTE [DISTWIDTH] IN BLOOD BY AUTOMATED COUNT: 13.9 % (ref 11.6–15.1)
GFR SERPL CREATININE-BSD FRML MDRD: 65 ML/MIN/1.73SQ M
GLUCOSE SERPL-MCNC: 87 MG/DL (ref 65–140)
GLUCOSE UR STRIP-MCNC: NEGATIVE MG/DL
HCT VFR BLD AUTO: 41.3 % (ref 34.8–46.1)
HGB BLD-MCNC: 14 G/DL (ref 11.5–15.4)
HGB UR QL STRIP.AUTO: NEGATIVE
IMM GRANULOCYTES # BLD AUTO: 0.02 THOUSAND/UL (ref 0–0.2)
IMM GRANULOCYTES NFR BLD AUTO: 0 % (ref 0–2)
INR PPP: 1.09 (ref 0.85–1.19)
KETONES UR STRIP-MCNC: NEGATIVE MG/DL
LACTATE SERPL-SCNC: 0.8 MMOL/L (ref 0.5–2)
LEUKOCYTE ESTERASE UR QL STRIP: NEGATIVE
LIPASE SERPL-CCNC: 25 U/L (ref 11–82)
LYMPHOCYTES # BLD AUTO: 3.4 THOUSANDS/ΜL (ref 0.6–4.47)
LYMPHOCYTES NFR BLD AUTO: 35 % (ref 14–44)
MAGNESIUM SERPL-MCNC: 2 MG/DL (ref 1.9–2.7)
MCH RBC QN AUTO: 30 PG (ref 26.8–34.3)
MCHC RBC AUTO-ENTMCNC: 33.9 G/DL (ref 31.4–37.4)
MCV RBC AUTO: 89 FL (ref 82–98)
MONOCYTES # BLD AUTO: 0.73 THOUSAND/ΜL (ref 0.17–1.22)
MONOCYTES NFR BLD AUTO: 8 % (ref 4–12)
NEUTROPHILS # BLD AUTO: 5.27 THOUSANDS/ΜL (ref 1.85–7.62)
NEUTS SEG NFR BLD AUTO: 55 % (ref 43–75)
NITRITE UR QL STRIP: NEGATIVE
NRBC BLD AUTO-RTO: 0 /100 WBCS
P AXIS: 82 DEGREES
PH UR STRIP.AUTO: 7 [PH] (ref 4.5–8)
PLATELET # BLD AUTO: 456 THOUSANDS/UL (ref 149–390)
PMV BLD AUTO: 9.5 FL (ref 8.9–12.7)
POTASSIUM SERPL-SCNC: 3.7 MMOL/L (ref 3.5–5.3)
PR INTERVAL: 156 MS
PROT SERPL-MCNC: 8.4 G/DL (ref 6.4–8.4)
PROT UR STRIP-MCNC: NEGATIVE MG/DL
PROTHROMBIN TIME: 14.3 SECONDS (ref 12.3–15)
QRS AXIS: 48 DEGREES
QRSD INTERVAL: 72 MS
QT INTERVAL: 346 MS
QTC INTERVAL: 430 MS
RBC # BLD AUTO: 4.66 MILLION/UL (ref 3.81–5.12)
SODIUM SERPL-SCNC: 132 MMOL/L (ref 135–147)
SP GR UR STRIP.AUTO: 1.01 (ref 1–1.03)
T WAVE AXIS: 32 DEGREES
UROBILINOGEN UR QL STRIP.AUTO: 0.2 E.U./DL
VENTRICULAR RATE: 93 BPM
WBC # BLD AUTO: 9.63 THOUSAND/UL (ref 4.31–10.16)

## 2025-01-22 PROCEDURE — 83690 ASSAY OF LIPASE: CPT | Performed by: EMERGENCY MEDICINE

## 2025-01-22 PROCEDURE — 96360 HYDRATION IV INFUSION INIT: CPT

## 2025-01-22 PROCEDURE — 82378 CARCINOEMBRYONIC ANTIGEN: CPT | Performed by: STUDENT IN AN ORGANIZED HEALTH CARE EDUCATION/TRAINING PROGRAM

## 2025-01-22 PROCEDURE — 99284 EMERGENCY DEPT VISIT MOD MDM: CPT

## 2025-01-22 PROCEDURE — 74177 CT ABD & PELVIS W/CONTRAST: CPT

## 2025-01-22 PROCEDURE — 80053 COMPREHEN METABOLIC PANEL: CPT | Performed by: EMERGENCY MEDICINE

## 2025-01-22 PROCEDURE — 85610 PROTHROMBIN TIME: CPT | Performed by: EMERGENCY MEDICINE

## 2025-01-22 PROCEDURE — 84484 ASSAY OF TROPONIN QUANT: CPT | Performed by: EMERGENCY MEDICINE

## 2025-01-22 PROCEDURE — 85025 COMPLETE CBC W/AUTO DIFF WBC: CPT | Performed by: EMERGENCY MEDICINE

## 2025-01-22 PROCEDURE — NC001 PR NO CHARGE: Performed by: STUDENT IN AN ORGANIZED HEALTH CARE EDUCATION/TRAINING PROGRAM

## 2025-01-22 PROCEDURE — 83735 ASSAY OF MAGNESIUM: CPT | Performed by: EMERGENCY MEDICINE

## 2025-01-22 PROCEDURE — 99285 EMERGENCY DEPT VISIT HI MDM: CPT | Performed by: EMERGENCY MEDICINE

## 2025-01-22 PROCEDURE — 96361 HYDRATE IV INFUSION ADD-ON: CPT

## 2025-01-22 PROCEDURE — 85730 THROMBOPLASTIN TIME PARTIAL: CPT | Performed by: EMERGENCY MEDICINE

## 2025-01-22 PROCEDURE — 71045 X-RAY EXAM CHEST 1 VIEW: CPT

## 2025-01-22 PROCEDURE — 36415 COLL VENOUS BLD VENIPUNCTURE: CPT | Performed by: EMERGENCY MEDICINE

## 2025-01-22 PROCEDURE — 83605 ASSAY OF LACTIC ACID: CPT | Performed by: EMERGENCY MEDICINE

## 2025-01-22 PROCEDURE — 93010 ELECTROCARDIOGRAM REPORT: CPT | Performed by: INTERNAL MEDICINE

## 2025-01-22 PROCEDURE — 86301 IMMUNOASSAY TUMOR CA 19-9: CPT | Performed by: EMERGENCY MEDICINE

## 2025-01-22 PROCEDURE — 93005 ELECTROCARDIOGRAM TRACING: CPT

## 2025-01-22 PROCEDURE — 81003 URINALYSIS AUTO W/O SCOPE: CPT

## 2025-01-22 PROCEDURE — 86304 IMMUNOASSAY TUMOR CA 125: CPT | Performed by: STUDENT IN AN ORGANIZED HEALTH CARE EDUCATION/TRAINING PROGRAM

## 2025-01-22 RX ORDER — ACETAMINOPHEN 325 MG/1
650 TABLET ORAL ONCE
Status: DISCONTINUED | OUTPATIENT
Start: 2025-01-22 | End: 2025-01-22 | Stop reason: HOSPADM

## 2025-01-22 RX ORDER — ONDANSETRON 2 MG/ML
4 INJECTION INTRAMUSCULAR; INTRAVENOUS ONCE
Status: DISCONTINUED | OUTPATIENT
Start: 2025-01-22 | End: 2025-01-22 | Stop reason: HOSPADM

## 2025-01-22 RX ORDER — SUCRALFATE 1 G/1
1 TABLET ORAL ONCE
Status: COMPLETED | OUTPATIENT
Start: 2025-01-22 | End: 2025-01-22

## 2025-01-22 RX ORDER — SUCRALFATE ORAL 1 G/10ML
1 SUSPENSION ORAL 4 TIMES DAILY
Qty: 280 ML | Refills: 0 | Status: SHIPPED | OUTPATIENT
Start: 2025-01-22 | End: 2025-01-29

## 2025-01-22 RX ORDER — HYOSCYAMINE SULFATE 0.125 MG
0.12 TABLET ORAL EVERY 4 HOURS PRN
Qty: 30 TABLET | Refills: 0 | Status: SHIPPED | OUTPATIENT
Start: 2025-01-22

## 2025-01-22 RX ORDER — ONDANSETRON 4 MG/1
4 TABLET, FILM COATED ORAL EVERY 6 HOURS
Qty: 12 TABLET | Refills: 0 | Status: SHIPPED | OUTPATIENT
Start: 2025-01-22

## 2025-01-22 RX ADMIN — SUCRALFATE 1 G: 1 TABLET ORAL at 14:06

## 2025-01-22 RX ADMIN — HYOSCYAMINE SULFATE 0.12 MG: 0.12 TABLET SUBLINGUAL at 14:52

## 2025-01-22 RX ADMIN — IOHEXOL 100 ML: 350 INJECTION, SOLUTION INTRAVENOUS at 15:29

## 2025-01-22 RX ADMIN — IOHEXOL 50 ML: 240 INJECTION, SOLUTION INTRATHECAL; INTRAVASCULAR; INTRAVENOUS; ORAL at 15:29

## 2025-01-22 RX ADMIN — SODIUM CHLORIDE 1000 ML: 0.9 INJECTION, SOLUTION INTRAVENOUS at 14:03

## 2025-01-22 NOTE — ASSESSMENT & PLAN NOTE
S/P Laparoscopic supracervical hysterectomy & bilateral salpingectomy secondary to fibroid uterus in 2021

## 2025-01-22 NOTE — ED PROVIDER NOTES
Time reflects when diagnosis was documented in both MDM as applicable and the Disposition within this note       Time User Action Codes Description Comment    1/22/2025  2:26 PM CarleeLamar ceja Add [R10.9] Abdominal pain     1/22/2025  2:27 PM Bendbrenna, Lamar L Add [I10] HTN (hypertension)     1/22/2025  4:43 PM BendShay cejale L Add [N83.8] Ovarian mass           ED Disposition       ED Disposition   Discharge    Condition   Stable    Date/Time   Wed Jan 22, 2025  5:41 PM    Comment   Esther Jonathonchevymary discharge to home/self care.                   Assessment & Plan       Medical Decision Making      Differential diagnosis includes but not limited to:  Appendicitis, viral syndrome, constipation, AMI, NSTEMI, pneumonia, pneuothorax, gerd, gastritis,  mesenteric ischemia, mesenteric adenitis, pancreatitis, cholecystitis, choledocholithiasis, hepatitis, bowel obstruction, ileus, gastroenteritis, colitis, malignancy, AAA, perforation, toxicologic poisoning, renal infarct, acute kidney injury, splenic infarct, splenic injury, nephrolithiasis, UTI, muscular strain, intra-abdominal hematoma, hernia, ovarian cyst, ovarian torsion,     It is noted in further chart review the patient had a hysterectomy and bilateral fallopian tube removal but still has ovaries.      Will check labs to evaluate for electrolyte abnormality, NICKI, anemia, significant leukocytosis, pancreatitis, hepatitis and biliary etiology. Will also check urine for  UTI.  Will also check EKG and troponin to assess for arrhythmia versus ischemia.  Will obtain CT abdomen pelvis to rule out intra-abdominal pathology as well as chest x-ray to rule out pneumonia, free air      Based on history and physical concerning for cholecystitis versus choledocholithiasis versus pancreatitis versus GERD versus hernia versus mesenteric ischemia.  Did consider hepatitis, NSTEMI, arrhythmia.    Problems Addressed:  Abdominal pain: acute illness or injury  HTN (hypertension):  "chronic illness or injury    Amount and/or Complexity of Data Reviewed  External Data Reviewed: notes.     Details:   Chart review shows that patient was seen by her PCP in December 2024 and had abdominal pain that time for over a year.  Diagnosis was consistent with muscular spasm.          Labs: ordered. Decision-making details documented in ED Course.     Details:     No anemia, thrombocytopenia or leukocytosis  No acute kidney injury or electrolyte abnormality  Troponin less than 2 with a heart score of 3  Lipase within normal range  Urine without any evidence of infection  Lactic acid within normal range    Radiology: ordered. Decision-making details documented in ED Course.     Details:     CT abdomen pelvis interpreted by radiologist as Large cystic and solid right adnexal mass, consistent with a primary ovarian serous cystadenocarcinoma. The associated omental nodularity and trace ascites is highly concerning for peritoneal carcinomatosis.      ECG/medicine tests: ordered and independent interpretation performed. Decision-making details documented in ED Course.     Details:     No ischemia or arrhythmias         Discussion of management or test interpretation with external provider(s): Gynecology oncology    Risk  OTC drugs.  Prescription drug management.        ED Course as of 01/22/25 2034 Wed Jan 22, 2025   1220 Patient 54 year old female coming in today with intermittent abdominal pain over a year however since Friday has been persistently worsening.  On exam she is tender to the epigastric region as well as right upper quadrant.  Given age and comorbidities will also obtain EKG and troponin.  She is nonperitoneal.  Will have patient check for CT    Disclosure: Voice to text software was used in the preparation of this document and could have resulted in translational errors.      Occasional wrong word or \"sound a like\" substitutions may have occurred due to the inherent limitations of voice " "recognition software.  Read the chart carefully and recognize, using context, where substitutions have occurred.       I have independently reviewed external records are available to me to the level of detail possible within the time constraints of my patient care responsibilities in the ED.       1341 Urine Macroscopic, POC  WNL       1407 CBC and differential(!)  WNL       1425 HS Troponin 0hr (reflex protocol)  <2 with a heart score of 3.  Given patient's symptoms been ongoing for greater than 24 hours unlikely cardiac in etiology and DC delta troponin.       1444 Patient resting in bed and states her pain is subsiding.  Reviewed chest x-ray, EKG lab work and urine.  Her vital signs improved.  Will give Levsin while waiting for CT scan.  Patient nonperitoneal and agreeable plan       1628 Radiologist did reach out and shows \"Large cystic and solid right adnexal mass, consistent with a primary ovarian serous cystadenocarcinoma. The associated omental nodularity and trace ascites is highly concerning for peritoneal carcinomatosis.\"    Will reach out to Gyn onc resident       1644 Patient and chart review does show that she has had a hysterectomy and fallopian tube removal but not oophorectomy.    Patient I had lengthy discussion regarding CT findings and pending evaluation by gynecology oncology who will be down for evaluation.    Patient states that she had her gynecological evaluation last year [facility] and has 1 coming up.  She has no family history of gynecologic cancers that she is aware of.  Answered questions at bedside         1724 Dr Maynard and Dr Saab has evaluated patient and discussed with them at length regarding tumor markers which will be collected and completed today as well as up for follow-up.  Will also instruct her Tylenol Motrin as needed.  They did review ER precautions with patient.       1738 Patient and I had lengthy discussion regarding workup, her discussion with gynecology oncology " and plan.  Patient is agreeable for discharge home and will give Carafate, Levsin and Zofran for home to help treat symptomatically.  Patient understands is important to follow-up.  Will make sure patient's tumor markers are completed before going home.    Patient and I had discussion and she is agreeable for return to ER instructions.       Medications   sodium chloride 0.9 % bolus 1,000 mL (0 mL Intravenous Stopped 1/22/25 1630)   sucralfate (CARAFATE) tablet 1 g (1 g Oral Given 1/22/25 1406)   hyoscyamine (LEVSIN/SL) SL tablet 0.125 mg (0.125 mg Sublingual Given 1/22/25 1452)   iohexol (OMNIPAQUE) 350 MG/ML injection (SINGLE-DOSE) 100 mL (100 mL Intravenous Given 1/22/25 1529)   iohexol (OMNIPAQUE) 240 MG/ML solution 50 mL (50 mL Oral Given 1/22/25 1529)       ED Risk Strat Scores   HEART Risk Score      Flowsheet Row Most Recent Value   Heart Score Risk Calculator    History 0 Filed at: 01/22/2025 1425   ECG 1 Filed at: 01/22/2025 1425   Age 1 Filed at: 01/22/2025 1425   Risk Factors 1 Filed at: 01/22/2025 1425   Troponin 0 Filed at: 01/22/2025 1425   HEART Score 3 Filed at: 01/22/2025 1425          HEART Risk Score      Flowsheet Row Most Recent Value   Heart Score Risk Calculator    History 0 Filed at: 01/22/2025 1425   ECG 1 Filed at: 01/22/2025 1425   Age 1 Filed at: 01/22/2025 1425   Risk Factors 1 Filed at: 01/22/2025 1425   Troponin 0 Filed at: 01/22/2025 1425   HEART Score 3 Filed at: 01/22/2025 1425                            SBIRT 22yo+      Flowsheet Row Most Recent Value   Initial Alcohol Screen: US AUDIT-C     1. How often do you have a drink containing alcohol? 0 Filed at: 01/22/2025 6490   2. How many drinks containing alcohol do you have on a typical day you are drinking?  0 Filed at: 01/22/2025 7939   3a. Male UNDER 65: How often do you have five or more drinks on one occasion? 0 Filed at: 01/22/2025 1444   3b. FEMALE Any Age, or MALE 65+: How often do you have 4 or more drinks on one occassion?  "0 Filed at: 2025 1444   Audit-C Score 0 Filed at: 2025 1444   BORIS: How many times in the past year have you...    Used an illegal drug or used a prescription medication for non-medical reasons? Never Filed at: 2025 1444                            History of Present Illness       Chief Complaint   Patient presents with    Abdominal Pain     Left sided abdominal pain \"for a year\". Was initially told she was having spasms. Increased bloating. Taking tylenol without relief.        Past Medical History:   Diagnosis Date    Asthma     Breathing difficulty     post issues with difficulty breathing    GERD (gastroesophageal reflux disease)     Hypertension     Lumbar radiculopathy 2016      Past Surgical History:   Procedure Laterality Date    BREAST BIOPSY Left 06/10/2021    us bx- neg     SECTION      CYSTOSCOPY N/A 2021    Procedure: CYSTOSCOPY;  Surgeon: Gustavo Murillo DO;  Location: [facility];  Service: Gynecology    MA LAPS SUPRACRV HYSTERECT 250 GM/< RMVL TUBE/OVAR N/A 2021    Procedure: LSH WITH B/L SALPINGECTOMY;  Surgeon: Gustavo Murillo DO;  Location: [facility];  Service: Gynecology    REDUCTION MAMMAPLASTY Bilateral     US GUIDED BREAST BIOPSY LEFT COMPLETE Left 6/10/2021      Family History   Problem Relation Age of Onset    Hypertension Mother     Diabetes Father     No Known Problems Sister     No Known Problems Daughter     No Known Problems Maternal Grandmother     No Known Problems Maternal Grandfather     No Known Problems Paternal Grandmother     No Known Problems Paternal Grandfather     No Known Problems Sister     No Known Problems Sister     No Known Problems Sister     No Known Problems Sister     No Known Problems Daughter     No Known Problems Daughter     No Known Problems Paternal Aunt     No Known Problems Son     No Known Problems Son       Social History     Tobacco Use    Smoking status: Never    Smokeless tobacco: Never   Vaping Use    " Vaping status: Never Used   Substance Use Topics    Alcohol use: Yes     Comment: occational     Drug use: Never     Comment: occassional wine      E-Cigarette/Vaping    E-Cigarette Use Never User       E-Cigarette/Vaping Substances    Nicotine No     THC No     CBD No     Flavoring No     Other No     Unknown No       I have reviewed and agree with the history as documented.       Patient is a 54-year-old female with a history of hypertension, hyperlipidemia and obstructive sleep apnea.  Patient states that for approximately 1 year she has been having intermittent abdominal pain that she cannot describe.  She states that she has been seen by her family practice physician about and was told it was a muscular strain.  She states that it will come and go and last for several minutes and resolved.  However, approximately 6 5 to 6 days ago her pain started and points to the epigastric and right upper quadrant that has been persistent ever since.  She again has difficulty describing the pain but states that it is more persistent and lasting longer.  Also moved down into the left lower quadrant.  She has no fevers, chills, nausea, vomiting, diarrhea.  She states that the area around her epigastric region felt more firm so she drank clove tea as well as something to help move her bowels.  She has been having normal bowel movements without any melena or bright red blood per rectum.  She has no vaginal bleeding, spotting, dysuria urinary frequency.  She has no recent travel, sick contacts.  She states that she has had Cologuard testing in the past which was negative.  She has had a hysterectomy and  otherwise no abdominal surgeries          History provided by:  Patient and medical records   used: No    Abdominal Pain  Pain location:  Epigastric and RUQ  Pain quality comment:  Cannot describe  Pain radiates to:  LLQ  Pain severity:  Moderate  Onset quality:  Gradual  Timing:   Constant  Progression:  Worsening  Chronicity:  Recurrent  Context: not alcohol use, not awakening from sleep, not diet changes, not eating, not laxative use, not medication withdrawal, not previous surgeries, not recent illness, not recent sexual activity, not recent travel, not retching, not sick contacts, not suspicious food intake and not trauma    Relieved by:  Nothing  Worsened by:  Movement  Ineffective treatments:  OTC medications  Associated symptoms: no anorexia, no belching, no chest pain, no chills, no constipation, no cough, no diarrhea, no dysuria, no fatigue, no fever, no flatus, no hematemesis, no hematochezia, no hematuria, no melena, no nausea, no shortness of breath, no sore throat, no vaginal bleeding, no vaginal discharge and no vomiting    Risk factors: obesity    Risk factors: no alcohol abuse, no aspirin use, no NSAID use, not pregnant and no recent hospitalization    Abdominal Cramping  Associated symptoms: no anorexia, no belching, no chest pain, no chills, no constipation, no cough, no diarrhea, no dysuria, no fatigue, no fever, no flatus, no hematemesis, no hematochezia, no hematuria, no melena, no nausea, no shortness of breath, no sore throat, no vaginal bleeding, no vaginal discharge and no vomiting        Review of Systems   Constitutional: Negative.  Negative for chills, fatigue and fever.   HENT: Negative.  Negative for ear pain and sore throat.    Eyes: Negative.  Negative for pain and visual disturbance.   Respiratory: Negative.  Negative for cough and shortness of breath.    Cardiovascular: Negative.  Negative for chest pain and palpitations.   Gastrointestinal:  Positive for abdominal pain. Negative for anorexia, constipation, diarrhea, flatus, hematemesis, hematochezia, melena, nausea and vomiting.   Genitourinary: Negative.  Negative for dysuria, hematuria, vaginal bleeding and vaginal discharge.   Musculoskeletal: Negative.  Negative for arthralgias and back pain.   Skin:   Negative for color change and rash.   Neurological: Negative.  Negative for seizures and syncope.   Hematological: Negative.    Psychiatric/Behavioral: Negative.     All other systems reviewed and are negative.          Objective       ED Triage Vitals   Temperature Pulse Blood Pressure Respirations SpO2 Patient Position - Orthostatic VS   01/22/25 1114 01/22/25 1114 01/22/25 1114 01/22/25 1114 01/22/25 1114 01/22/25 1442   97.9 °F (36.6 °C) (!) 109 (!) 154/102 18 100 % Lying      Temp src Heart Rate Source BP Location FiO2 (%) Pain Score    -- 01/22/25 1442 01/22/25 1442 -- 01/22/25 1442     Monitor Right arm  No Pain      Vitals      Date and Time Temp Pulse SpO2 Resp BP Pain Score FACES Pain Rating User   01/22/25 1802 -- 104 99 % 18 146/89 -- -- EG   01/22/25 1628 -- 94 98 % 16 140/86 -- -- EG   01/22/25 1500 -- 92 -- 20 132/84 -- -- EG   01/22/25 1442 -- 95 99 % 18 129/87 No Pain -- EG   01/22/25 1114 97.9 °F (36.6 °C) 109 100 % 18 154/102 -- -- JEREMÍAS            Physical Exam  Vitals and nursing note reviewed.   Constitutional:       General: She is awake. She is not in acute distress.     Appearance: Normal appearance. She is well-developed and overweight.   HENT:      Head: Normocephalic and atraumatic.      Mouth/Throat:      Lips: Pink.      Mouth: Mucous membranes are moist.      Pharynx: Oropharynx is clear. Uvula midline.   Eyes:      Extraocular Movements: Extraocular movements intact.      Conjunctiva/sclera: Conjunctivae normal.      Pupils: Pupils are equal, round, and reactive to light.   Neck:      Trachea: Trachea normal.   Cardiovascular:      Rate and Rhythm: Regular rhythm. Tachycardia present.      Pulses:           Radial pulses are 2+ on the right side and 2+ on the left side.        Dorsalis pedis pulses are 2+ on the right side and 2+ on the left side.      Heart sounds: Normal heart sounds, S1 normal and S2 normal. No murmur heard.  Pulmonary:      Effort: Pulmonary effort is normal. No  respiratory distress.      Breath sounds: Normal breath sounds.   Abdominal:      General: Abdomen is protuberant. Bowel sounds are normal.      Palpations: Abdomen is soft.      Tenderness: There is abdominal tenderness in the right upper quadrant, epigastric area and left lower quadrant. There is no right CVA tenderness, left CVA tenderness, guarding or rebound. Negative signs include Cadena's sign, Rovsing's sign and McBurney's sign.   Musculoskeletal:         General: No swelling.      Cervical back: Normal range of motion and neck supple. No rigidity.      Right lower leg: No edema.      Left lower leg: No edema.   Lymphadenopathy:      Cervical: No cervical adenopathy.   Skin:     General: Skin is warm and dry.      Capillary Refill: Capillary refill takes less than 2 seconds.   Neurological:      General: No focal deficit present.      Mental Status: She is alert and oriented to person, place, and time.      GCS: GCS eye subscore is 4. GCS verbal subscore is 5. GCS motor subscore is 6.      Cranial Nerves: Cranial nerves 2-12 are intact.      Sensory: Sensation is intact.      Motor: Motor function is intact.      Coordination: Coordination is intact.      Gait: Gait is intact.   Psychiatric:         Mood and Affect: Mood normal.         Behavior: Behavior is cooperative.         Results Reviewed       Procedure Component Value Units Date/Time    CEA [[id number]] Collected: 01/22/25 1800    Lab Status: In process Specimen: Blood from Arm, Left Updated: 01/22/25 1809    Cancer antigen 19-9 [[id number]] Collected: 01/22/25 1800    Lab Status: In process Specimen: Blood from Arm, Left Updated: 01/22/25 1809     [[id number]] Collected: 01/22/25 1350    Lab Status: In process Specimen: Blood from Arm, Right Updated: 01/22/25 1808    HS Troponin 0hr (reflex protocol) [[id number]]  (Normal) Collected: 01/22/25 1350    Lab Status: Final result Specimen: Blood from Arm, Right Updated: 01/22/25 1425     hs TnI 0hr <2  ng/L     Comprehensive metabolic panel [[id number]]  (Abnormal) Collected: 01/22/25 1350    Lab Status: Final result Specimen: Blood from Arm, Right Updated: 01/22/25 1418     Sodium 132 mmol/L      Potassium 3.7 mmol/L      Chloride 96 mmol/L      CO2 29 mmol/L      ANION GAP 7 mmol/L      BUN 11 mg/dL      Creatinine 0.97 mg/dL      Glucose 87 mg/dL      Calcium 9.9 mg/dL      AST 15 U/L      ALT 11 U/L      Alkaline Phosphatase 74 U/L      Total Protein 8.4 g/dL      Albumin 4.2 g/dL      Total Bilirubin 0.29 mg/dL      eGFR 65 ml/min/1.73sq m     Narrative:      National Kid[employer] Disease Foundation guidelines for Chronic Kidney Disease (CKD):     Stage 1 with normal or high GFR (GFR > 90 mL/min/1.73 square meters)    Stage 2 Mild CKD (GFR = 60-89 mL/min/1.73 square meters)    Stage 3A Moderate CKD (GFR = 45-59 mL/min/1.73 square meters)    Stage 3B Moderate CKD (GFR = 30-44 mL/min/1.73 square meters)    Stage 4 Severe CKD (GFR = 15-29 mL/min/1.73 square meters)    Stage 5 End Stage CKD (GFR <15 mL/min/1.73 square meters)  Note: GFR calculation is accurate only with a steady state creatinine    Magnesium [[id number]]  (Normal) Collected: 01/22/25 1350    Lab Status: Final result Specimen: Blood from Arm, Right Updated: 01/22/25 1418     Magnesium 2.0 mg/dL     Lipase [[id number]]  (Normal) Collected: 01/22/25 1350    Lab Status: Final result Specimen: Blood from Arm, Right Updated: 01/22/25 1418     Lipase 25 u/L     Lactic acid, plasma (w/reflex if result > 2.0) [[id number]]  (Normal) Collected: 01/22/25 1350    Lab Status: Final result Specimen: Blood from Arm, Right Updated: 01/22/25 1417     LACTIC ACID 0.8 mmol/L     Narrative:      Result may be elevated if tourniquet was used during collection.    Protime-INR [[id number]]  (Normal) Collected: 01/22/25 1350    Lab Status: Final result Specimen: Blood from Arm, Right Updated: 01/22/25 1414     Protime 14.3 seconds      INR 1.09    Narrative:      INR Therapeutic  Range    Indication                                             INR Range      Atrial Fibrillation                                               2.0-3.0  Hypercoagulable State                                    2.0.2.3  Left Ventricular Asist Device                            2.0-3.0  Mechanical Heart Valve                                  -    Aortic(with afib, MI, embolism, HF, LA enlargement,    and/or coagulopathy)                                     2.0-3.0 (2.5-3.5)     Mitral                                                             2.5-3.5  Prosthetic/Bioprosthetic Heart Valve               2.0-3.0  Venous thromboembolism (VTE: VT, PE        2.0-3.0    APTT [[id number]]  (Normal) Collected: 01/22/25 1350    Lab Status: Final result Specimen: Blood from Arm, Right Updated: 01/22/25 1414     PTT 29 seconds     CBC and differential [[id number]]  (Abnormal) Collected: 01/22/25 1350    Lab Status: Final result Specimen: Blood from Arm, Right Updated: 01/22/25 1406     WBC 9.63 Thousand/uL      RBC 4.66 Million/uL      Hemoglobin 14.0 g/dL      Hematocrit 41.3 %      MCV 89 fL      MCH 30.0 pg      MCHC 33.9 g/dL      RDW 13.9 %      MPV 9.5 fL      Platelets 456 Thousands/uL      nRBC 0 /100 WBCs      Segmented % 55 %      Immature Grans % 0 %      Lymphocytes % 35 %      Monocytes % 8 %      Eosinophils Relative 2 %      Basophils Relative 0 %      Absolute Neutrophils 5.27 Thousands/µL      Absolute Immature Grans 0.02 Thousand/uL      Absolute Lymphocytes 3.40 Thousands/µL      Absolute Monocytes 0.73 Thousand/µL      Eosinophils Absolute 0.18 Thousand/µL      Basophils Absolute 0.03 Thousands/µL     Urine Macroscopic, POC [[id number]] Collected: 01/22/25 133    Lab Status: Final result Specimen: Urine Updated: 01/22/25 1337     Color, UA Yellow     Clarity, UA Clear     pH, UA 7.0     Leukocytes, UA Negative     Nitrite, UA Negative     Protein, UA Negative mg/dl      Glucose, UA Negative mg/dl      Ketones,  UA Negative mg/dl      Urobilinogen, UA 0.2 E.U./dl      Bilirubin, UA Negative     Occult Blood, UA Negative     Specific Gravity, UA 1.015    Narrative:      CL[facility] RESULT            CT abdomen pelvis with contrast   Final Interpretation by Jeffry Cadet MD (01/22 8943)      1.  Large cystic and solid right adnexal mass, consistent with a primary ovarian serous cystadenocarcinoma. The associated omental nodularity and trace ascites is highly concerning for peritoneal carcinomatosis. There is an additional indeterminate    hepatic hypodensity, which cannot be further characterized. If clinically indicated, this could be further assessed by follow-up gadolinium enhanced MRI.         I directly communicated these results to REG HEATON and Pepe Rivera via [employer] secure chat with confirmation of receipt on 1/22/2025 4:24 PM.         Workstation performed: [id number]         XR chest 1 view portable   Final Interpretation by Shala Pendleton MD (01/22 1382)   No acute consolidation or congestion            Workstation performed: [id number]             ECG 12 Lead Documentation Only    Date/Time: 1/22/2025 12:57 PM    Performed by: Reg Heaton DO  Authorized by: Reg Heaton DO    Indications / Diagnosis:  Epigastric pain  ECG reviewed by me, the ED Provider: yes    Patient location:  ED  Previous ECG:     Previous ECG:  Compared to current    Comparison ECG info:  11/19/2021    Similarity:  No change  Interpretation:     Interpretation: non-specific    Quality:     Tracing quality:  Limited by artifact  Rate:     ECG rate:  90    ECG rate assessment: normal    Rhythm:     Rhythm: sinus rhythm    Ectopy:     Ectopy: none    QRS:     QRS axis:  Normal    QRS intervals:  Normal  Conduction:     Conduction: normal    ST segments:     ST segments:  Non-specific  T waves:     T waves: non-specific    Comments:      Normal axis  QTC @ 430 ms        ED Medication and Procedure Management   Prior to  Admission Medications   Prescriptions Last Dose Informant Patient Reported? Taking?   Blood Pressure Monitoring (Blood Pressure Cuff) MISC  Self No No   Sig: Use 2 (two) times a day Omron, Upper arm, large cuff   Insulin Pen Needle (BD Pen Needle Rajani U/F) 32G X 4 MM MISC   No No   Sig: Use daily as directed with insulin pen   acetaminophen (TYLENOL) 325 mg tablet  Self No No   Sig: Take 2 tablets (650 mg total) by mouth every 6 (six) hours as needed for mild pain   carvedilol (COREG) 12.5 mg tablet   No No   Sig: TAKE 1 TABLET BY MOUTH TWICE A DAY WITH FOOD   hydrochlorothiazide (HYDRODIURIL) 25 mg tablet   No No   Sig: TAKE 1 TABLET (25 MG TOTAL) BY MOUTH DAILY.   lidocaine (Lidoderm) 5 %  Self No No   Sig: Apply 1 patch topically daily Remove & Discard patch within 12 hours or as directed by MD   liraglutide (SAXENDA) injection   No No   Sig: Inject 0.1 mL (0.6 mg total) under the skin daily Use 0.6 mg every day, you can increase dose by 0.6 mg every 7 days until maximum dose of 3 mg or until side effects.   lisinopril (ZESTRIL) 40 mg tablet  Self No No   Sig: Take 1 tablet (40 mg total) by mouth daily   methocarbamol (ROBAXIN) 500 mg tablet  Self No No   Sig: Take 1 tablet (500 mg total) by mouth 2 (two) times a day   oxyCODONE-acetaminophen (PERCOCET) 5-325 mg per tablet  Self No No   Sig: Take 1 tablet by mouth every 4 (four) hours as needed for severe pain for up to 15 doses Max Daily Amount: 6 tablets      Facility-Administered Medications: None     Discharge Medication List as of 1/22/2025  5:44 PM        START taking these medications    Details   hyoscyamine (ANASPAZ,LEVSIN) 0.125 MG tablet Take 1 tablet (0.125 mg total) by mouth every 4 (four) hours as needed for cramping, Starting Wed 1/22/2025, Normal      ondansetron (ZOFRAN) 4 mg tablet Take 1 tablet (4 mg total) by mouth every 6 (six) hours, Starting Wed 1/22/2025, Normal      sucralfate (CARAFATE) 1 g/10 mL suspension Take 10 mL (1 g total) by  mouth 4 (four) times a day for 7 days, Starting Wed 1/22/2025, Until Wed 1/29/2025, Normal           CONTINUE these medications which have NOT CHANGED    Details   acetaminophen (TYLENOL) 325 mg tablet Take 2 tablets (650 mg total) by mouth every 6 (six) hours as needed for mild pain, Starting Sat 8/6/2022, Normal      Blood Pressure Monitoring (Blood Pressure Cuff) MISC Use 2 (two) times a day [employer], Upper arm, large cuff, Starting Mon 4/5/2021, Print      carvedilol (COREG) 12.5 mg tablet TAKE 1 TABLET BY MOUTH TWICE A DAY WITH FOOD, Normal      hydrochlorothiazide (HYDRODIURIL) 25 mg tablet TAKE 1 TABLET (25 MG TOTAL) BY MOUTH DAILY., Starting Mon 8/29/2022, Normal      Insulin Pen Needle (BD Pen Needle Rajani U/F) 32G X 4 MM MISC Use daily as directed with insulin pen, Normal      lidocaine (Lidoderm) 5 % Apply 1 patch topically daily Remove & Discard patch within 12 hours or as directed by MD, Starting Sat 8/6/2022, Normal      liraglutide (SAXENDA) injection Inject 0.1 mL (0.6 mg total) under the skin daily Use 0.6 mg every day, you can increase dose by 0.6 mg every 7 days until maximum dose of 3 mg or until side effects., Starting Wed 8/17/2022, Normal      lisinopril (ZESTRIL) 40 mg tablet Take 1 tablet (40 mg total) by mouth daily, Starting Mon 2/28/2022, Normal      methocarbamol (ROBAXIN) 500 mg tablet Take 1 tablet (500 mg total) by mouth 2 (two) times a day, Starting Sat 8/6/2022, Normal      oxyCODONE-acetaminophen (PERCOCET) 5-325 mg per tablet Take 1 tablet by mouth every 4 (four) hours as needed for severe pain for up to 15 doses Max Daily Amount: 6 tablets, Starting Mon 12/6/2021, Normal             ED SEPSIS DOCUMENTATION   Time reflects when diagnosis was documented in both MDM as applicable and the Disposition within this note       Time User Action Codes Description Comment    1/22/2025  2:26 PM Lamar Heaton Add [R10.9] Abdominal pain     1/22/2025  2:27 PM Lamar Heaton Add [I10] HTN  (hypertension)     1/22/2025  4:43 PM Lamar Heaton Add [N83.8] Ovarian mass                  Lamar Heaton,   01/22/25 2036

## 2025-01-22 NOTE — Clinical Note
Esther Gonzalez was seen and treated in our emergency department on 1/22/2025.                Diagnosis:     Esther  .    She may return on this date: 01/29/2025         If you have any questions or concerns, please don't hesitate to call.      Lamar Heaton, DO    ______________________________           _______________          _______________  Hospital Representative                              Date                                Time

## 2025-01-22 NOTE — CONSULTS
"Consultation - GynOncology  Esthre Gonzalez 54 y.o. female MRN: [id number]  Unit/Bed#: ED-26 Encounter: [id number]      Inpatient consult to Gynecologic Oncology  Consult performed by: Breonna Maynard MD  Consult ordered by: Lamar Heaton DO            Chief Complaint   Patient presents with    Abdominal Pain     Left sided abdominal pain \"for a year\". Was initially told she was having spasms. Increased bloating. Taking tylenol without relief.        Assessment/Plan      * Adnexal mass  Assessment & Plan  55 yo with hx of chronic abdominal pain and presented to ED with worsening abdominal pain and noted to have large adnexal mass on imaging    CT AP 1/22:  7.0 x 14.8 x 17.9 cm  cystic and solid right adnexal mass, consistent with a primary ovarian serous cystadenocarcinoma. The associated omental nodularity and trace ascites is highly concerning for peritoneal carcinomatosis. There is an additional indeterminate   hepatic hypodensity, which cannot be further characterized.    Chest Xray 1/22:No acute consolidation or congestion       Plan:   patient has minimal abdominal pain at time of exam.   Given the imaging findings ,tumor markers (, CEA and CA 19-9)  and surgical intervention was recommended. The surgery recommended was Bilateral oophorectomy and possible trachelectomy. We discussed that given the size of the adnexal mass the surgery will likely require an open abdominal approach with a midline incision. Further details to be discussed during outpatient clinic consultation.  F/U outpatient for consultation and consenting for surgical intervention. Contact information provided to patient and message sent to office staff to help coordinate.       S/P laparoscopic supracervical hysterectomy  Assessment & Plan  S/P Laparoscopic supracervical hysterectomy & bilateral salpingectomy secondary to fibroid uterus in 2021    Class 2 obesity with body mass index (BMI) of 36.0 to 36.9 in adult  Assessment & " Plan  BMI 36    Essential hypertension  Assessment & Plan  Currently on Carvedilol , Hydrochlorothiazide and Lisinopril        Case discussed with Dr. aNye Starr     History of Present Illness:  Physician Requesting Consult: Dr. Lamar Heaton  Reason for Consult / Principal Problem:   HPI: Esther Gonzalez is a 54 y.o.  female w/ hx of laparoscopic supracervical hysterectomy who presented with abdominal pain. Patient reports that she has had chronic abdominal and back pain but had worsening abdominal pain int he past 4 days, which prompted her to present to the emergency room. She has had noticed increasing abdominal size as well as weight grain. She states that her Hysterectomy was for fibroid Uterus and had no known history of ovarian disease. She denies vaginal bleeding since the hysterectomy. She denies personal and family history of cancer. She has had routine pap smears , which have been NILM. Her last mammogram in 2023 was negative for malignancy. Cologuard was also negative in 2023. She denies history of bleeding and clotting disorders. She denies change in appetite and endorses voiding & BM without issue. All other review of symptoms are negative.      Historical Information   Past Medical History:   Diagnosis Date    Asthma     Breathing difficulty     post issues with difficulty breathing    GERD (gastroesophageal reflux disease)     Hypertension     Lumbar radiculopathy 2016     Past Surgical History:   Procedure Laterality Date    BREAST BIOPSY Left 06/10/2021    us bx- neg     SECTION      CYSTOSCOPY N/A 2021    Procedure: CYSTOSCOPY;  Surgeon: Gustavo Murillo DO;  Location: [facility];  Service: Gynecology    IN LAPS SUPRACRV HYSTERECT 250 GM/< RMVL TUBE/OVAR N/A 2021    Procedure: LSH WITH B/L SALPINGECTOMY;  Surgeon: Gustavo Murillo DO;  Location: [facility];  Service: Gynecology    REDUCTION MAMMAPLASTY Bilateral     US GUIDED BREAST BIOPSY LEFT  COMPLETE Left 6/10/2021     OB History    Para Term  AB Living   5 5 5      SAB IAB Ectopic Multiple Live Births             # Outcome Date GA Lbr Dominic/2nd Weight Sex Type Anes PTL Lv   5 Term            4 Term            3 Term            2 Term            1 Term              Family History   Problem Relation Age of Onset    Hypertension Mother     Diabetes Father     No Known Problems Sister     No Known Problems Daughter     No Known Problems Maternal Grandmother     No Known Problems Maternal Grandfather     No Known Problems Paternal Grandmother     No Known Problems Paternal Grandfather     No Known Problems Sister     No Known Problems Sister     No Known Problems Sister     No Known Problems Sister     No Known Problems Daughter     No Known Problems Daughter     No Known Problems Paternal Aunt     No Known Problems Son     No Known Problems Son      Social History   Social History     Substance and Sexual Activity   Alcohol Use Yes    Comment: occational      Social History     Substance and Sexual Activity   Drug Use Never    Comment: occassional wine     Social History     Tobacco Use   Smoking Status Never   Smokeless Tobacco Never     Allergies   Allergen Reactions    Aspirin Other (See Comments)     Reaction Date: 2012;   Chest tight    Calcium Carbonate      Reaction Date: 2012;     Ibuprofen      Reaction Date: 2012;     Penicillins Swelling       Objective   Vitals: Blood pressure 146/89, pulse 104, temperature 97.9 °F (36.6 °C), resp. rate 18, weight 92 kg (202 lb 13.2 oz), last menstrual period 2021, SpO2 99%, not currently breastfeeding. Body mass index is 36.51 kg/m².    Invasive Devices       None                   Physical Exam  Constitutional:       Appearance: Normal appearance.   HENT:      Head: Normocephalic and atraumatic.   Eyes:      Extraocular Movements: Extraocular movements intact.   Cardiovascular:      Rate and Rhythm: Normal rate.      Pulses:  Normal pulses.   Pulmonary:      Effort: Pulmonary effort is normal.   Abdominal:      General: There is distension (siginificant distention of the abdomen).      Palpations: Abdomen is soft. There is mass (palpable mass 3cm above the umbilicus).      Tenderness: There is abdominal tenderness. There is no guarding or rebound.      Comments: Well healed laparoscopic port site scars and mini laparotomy incision   Musculoskeletal:         General: Normal range of motion.      Cervical back: Normal range of motion.   Skin:     General: Skin is warm and dry.   Neurological:      General: No focal deficit present.      Mental Status: She is alert.   Psychiatric:         Mood and Affect: Mood normal.         Behavior: Behavior normal.         Lab Results:   Recent Results (from the past 24 hours)   ECG 12 lead    Collection Time: 01/22/25 12:48 PM   Result Value Ref Range    Ventricular Rate 93 BPM    Atrial Rate 93 BPM    MN Interval 156 ms    QRSD Interval 72 ms    QT Interval 346 ms    QTC Interval 430 ms    P Axis 82 degrees    QRS Axis 48 degrees    T Wave Axis 32 degrees   Urine Macroscopic, POC    Collection Time: 01/22/25  1:36 PM   Result Value Ref Range    Color, UA Yellow     Clarity, UA Clear     pH, UA 7.0 4.5 - 8.0    Leukocytes, UA Negative Negative    Nitrite, UA Negative Negative    Protein, UA Negative Negative mg/dl    Glucose, UA Negative Negative mg/dl    Ketones, UA Negative Negative mg/dl    Urobilinogen, UA 0.2 0.2, 1.0 E.U./dl E.U./dl    Bilirubin, UA Negative Negative    Occult Blood, UA Negative Negative    Specific Gravity, UA 1.015 1.003 - 1.030   CBC and differential    Collection Time: 01/22/25  1:50 PM   Result Value Ref Range    WBC 9.63 4.31 - 10.16 Thousand/uL    RBC 4.66 3.81 - 5.12 Million/uL    Hemoglobin 14.0 11.5 - 15.4 g/dL    Hematocrit 41.3 34.8 - 46.1 %    MCV 89 82 - 98 fL    MCH 30.0 26.8 - 34.3 pg    MCHC 33.9 31.4 - 37.4 g/dL    RDW 13.9 11.6 - 15.1 %    MPV 9.5 8.9 - 12.7 fL  "   Platelets 456 (H) 149 - 390 Thousands/uL    nRBC 0 /100 WBCs    Segmented % 55 43 - 75 %    Immature Grans % 0 0 - 2 %    Lymphocytes % 35 14 - 44 %    Monocytes % 8 4 - 12 %    Eosinophils Relative 2 0 - 6 %    Basophils Relative 0 0 - 1 %    Absolute Neutrophils 5.27 1.85 - 7.62 Thousands/µL    Absolute Immature Grans 0.02 0.00 - 0.20 Thousand/uL    Absolute Lymphocytes 3.40 0.60 - 4.47 Thousands/µL    Absolute Monocytes 0.73 0.17 - 1.22 Thousand/µL    Eosinophils Absolute 0.18 0.00 - 0.61 Thousand/µL    Basophils Absolute 0.03 0.00 - 0.10 Thousands/µL   Comprehensive metabolic panel    Collection Time: 01/22/25  1:50 PM   Result Value Ref Range    Sodium 132 (L) 135 - 147 mmol/L    Potassium 3.7 3.5 - 5.3 mmol/L    Chloride 96 96 - 108 mmol/L    CO2 29 21 - 32 mmol/L    ANION GAP 7 4 - 13 mmol/L    BUN 11 5 - 25 mg/dL    Creatinine 0.97 0.60 - 1.30 mg/dL    Glucose 87 65 - 140 mg/dL    Calcium 9.9 8.4 - 10.2 mg/dL    AST 15 13 - 39 U/L    ALT 11 7 - 52 U/L    Alkaline Phosphatase 74 34 - 104 U/L    Total Protein 8.4 6.4 - 8.4 g/dL    Albumin 4.2 3.5 - 5.0 g/dL    Total Bilirubin 0.29 0.20 - 1.00 mg/dL    eGFR 65 ml/min/1.73sq m   Protime-INR    Collection Time: 01/22/25  1:50 PM   Result Value Ref Range    Protime 14.3 12.3 - 15.0 seconds    INR 1.09 0.85 - 1.19   APTT    Collection Time: 01/22/25  1:50 PM   Result Value Ref Range    PTT 29 23 - 34 seconds   Magnesium    Collection Time: 01/22/25  1:50 PM   Result Value Ref Range    Magnesium 2.0 1.9 - 2.7 mg/dL   Lipase    Collection Time: 01/22/25  1:50 PM   Result Value Ref Range    Lipase 25 11 - 82 u/L   Lactic acid, plasma (w/reflex if result > 2.0)    Collection Time: 01/22/25  1:50 PM   Result Value Ref Range    LACTIC ACID 0.8 0.5 - 2.0 mmol/L   HS Troponin 0hr (reflex protocol)    Collection Time: 01/22/25  1:50 PM   Result Value Ref Range    hs TnI 0hr <2 \"Refer to ACS Flowchart\"- see link ng/L       Breonna Maynard MD  1/22/2025  7:24 PM      "

## 2025-01-22 NOTE — DISCHARGE INSTRUCTIONS
As discussed, your CAT scan showed concern for a ovarian mass concerning for cancer  Please  number above for appointment as soon as possible  If you have any other changes in your symptoms or concerning symptoms please do not hesitate to return to the ER or call 911

## 2025-01-22 NOTE — ASSESSMENT & PLAN NOTE
55 yo with hx of chronic abdominal pain and presented to ED with worsening abdominal pain and noted to have large adnexal mass on imaging    CT AP 1/22:  7.0 x 14.8 x 17.9 cm  cystic and solid right adnexal mass, consistent with a primary ovarian serous cystadenocarcinoma. The associated omental nodularity and trace ascites is highly concerning for peritoneal carcinomatosis. There is an additional indeterminate   hepatic hypodensity, which cannot be further characterized.    Chest Xray 1/22:No acute consolidation or congestion       Plan:   patient has minimal abdominal pain at time of exam.   Given the imaging findings ,tumor markers (, CEA and CA 19-9)  and surgical intervention was recommended. The surgery recommended was Bilateral oophorectomy and possible trachelectomy. We discussed that given the size of the adnexal mass the surgery will likely require an open abdominal approach with a midline incision. Further details to be discussed during outpatient clinic consultation.  F/U outpatient for consultation and consenting for surgical intervention. Contact information provided to patient and message sent to office staff to help coordinate.

## 2025-01-22 NOTE — ED NOTES
This RN tried two IVs with no success. Getting other RN to get IV     Pepe Rivera RN  01/22/25 [id number]

## 2025-01-23 ENCOUNTER — DOCUMENTATION (OUTPATIENT)
Dept: HEMATOLOGY ONCOLOGY | Facility: CLINIC | Age: 55
End: 2025-01-23

## 2025-01-23 ENCOUNTER — TELEPHONE (OUTPATIENT)
Dept: GYNECOLOGIC ONCOLOGY | Facility: CLINIC | Age: 55
End: 2025-01-23

## 2025-01-23 NOTE — TELEPHONE ENCOUNTER
Made 2nd attempt to reach patient today to schedule for consultation. Does not ring and patient does not have a VM set up yet. Will have staff try again tomorrow.

## 2025-01-23 NOTE — PROGRESS NOTES
"Chart rvw'd on 2025      Referred by:  Dr. Lamar Heaton    Diagnosis:  Abdominal pain    Imagin2025 CT AP w contrast-  1.  Large cystic and solid right adnexal mass, consistent with a primary ovarian serous cystadenocarcinoma. The associated omental nodularity and trace ascites is highly concerning for peritoneal carcinomatosis. There is an additional indeterminate   hepatic hypodensity, which cannot be further characterized. If clinically indicated, this could be further assessed by follow-up gadolinium enhanced MRI.    \"There is a large (17.0 x 14.8 x 17.9 cm, predominantly cystic and multiseptated lesion within the upper pelvis. There is a large irregular solid component seen along the right lateral wall. The   engorgement of the right gonadal vein suggests a right ovarian origin.\"    Pathology:  N/A    Labs:  2025      182.5  2025    CEA    18.2      Scheduled appointments:  Pt is scheduled with OBGYN on 2025      Surgery:  N/A      Post surgical pathology:  N/A        "

## 2025-01-24 ENCOUNTER — TELEPHONE (OUTPATIENT)
Age: 55
End: 2025-01-24

## 2025-01-24 ENCOUNTER — TELEPHONE (OUTPATIENT)
Dept: GYNECOLOGIC ONCOLOGY | Facility: CLINIC | Age: 55
End: 2025-01-24

## 2025-01-24 LAB — CANCER AG19-9 SERPL-ACNC: 52 U/ML (ref 0–35)

## 2025-01-24 NOTE — TELEPHONE ENCOUNTER
Called patient and left a voicemail. Offered her a sooner appointment in [place] with Dr. Garcia for this Monday, 1/27/25 at 11:15 am. [facility] number was left for call back. Appointment being held for patient.

## 2025-01-24 NOTE — TELEPHONE ENCOUNTER
Scheduled for first available in [place], patients preferred site, 2/6/25 @ 9AM with Dr. Garcia. Message sent to leadership as this falls outside of referral guideline of urgent 3 days

## 2025-01-24 NOTE — TELEPHONE ENCOUNTER
Called patient and left a second voicemail explaining that we would like her to come in on Monday and not on Tuesday per a previous phone call. Asked the patient to please call back to see if she would like to come on Monday instead.

## 2025-01-28 ENCOUNTER — DOCUMENTATION (OUTPATIENT)
Dept: OTHER | Facility: HOSPITAL | Age: 55
End: 2025-01-28

## 2025-01-28 ENCOUNTER — CONSULT (OUTPATIENT)
Dept: GYNECOLOGIC ONCOLOGY | Facility: CLINIC | Age: 55
End: 2025-01-28
Payer: COMMERCIAL

## 2025-01-28 VITALS
BODY MASS INDEX: 37.08 KG/M2 | HEART RATE: 118 BPM | TEMPERATURE: 98.4 F | DIASTOLIC BLOOD PRESSURE: 84 MMHG | SYSTOLIC BLOOD PRESSURE: 142 MMHG | WEIGHT: 206 LBS | OXYGEN SATURATION: 98 %

## 2025-01-28 DIAGNOSIS — R10.9 ABDOMINAL PAIN: ICD-10-CM

## 2025-01-28 DIAGNOSIS — E66.812 CLASS 2 OBESITY WITH BODY MASS INDEX (BMI) OF 36.0 TO 36.9 IN ADULT, UNSPECIFIED OBESITY TYPE, UNSPECIFIED WHETHER SERIOUS COMORBIDITY PRESENT: ICD-10-CM

## 2025-01-28 DIAGNOSIS — Z12.4 CERVICAL CANCER SCREENING: ICD-10-CM

## 2025-01-28 DIAGNOSIS — N94.89 ADNEXAL MASS: Primary | ICD-10-CM

## 2025-01-28 DIAGNOSIS — I10 ESSENTIAL HYPERTENSION: ICD-10-CM

## 2025-01-28 PROBLEM — M54.32 LEFT SIDED SCIATICA: Status: RESOLVED | Noted: 2020-03-05 | Resolved: 2025-01-28

## 2025-01-28 PROBLEM — M72.2 PLANTAR FASCIITIS: Status: RESOLVED | Noted: 2020-03-05 | Resolved: 2025-01-28

## 2025-01-28 PROBLEM — Q64.79 STRUCTURAL ABNORMALITY OF BLADDER: Status: RESOLVED | Noted: 2022-02-15 | Resolved: 2025-01-28

## 2025-01-28 PROBLEM — Z90.711 S/P LAPAROSCOPIC SUPRACERVICAL HYSTERECTOMY: Status: RESOLVED | Noted: 2021-12-06 | Resolved: 2025-01-28

## 2025-01-28 PROBLEM — M25.561 ACUTE PAIN OF RIGHT KNEE: Status: RESOLVED | Noted: 2022-06-02 | Resolved: 2025-01-28

## 2025-01-28 PROBLEM — M70.50 ANSERINE BURSITIS: Status: RESOLVED | Noted: 2022-06-02 | Resolved: 2025-01-28

## 2025-01-28 PROBLEM — D50.0 IRON DEFICIENCY ANEMIA DUE TO CHRONIC BLOOD LOSS: Status: RESOLVED | Noted: 2021-03-29 | Resolved: 2025-01-28

## 2025-01-28 PROBLEM — Z90.79 STATUS POST BILATERAL SALPINGECTOMY: Status: RESOLVED | Noted: 2021-12-06 | Resolved: 2025-01-28

## 2025-01-28 PROBLEM — Z88.8 ASPIRIN ALLERGY: Status: RESOLVED | Noted: 2021-05-06 | Resolved: 2025-01-28

## 2025-01-28 PROCEDURE — 88175 CYTOPATH C/V AUTO FLUID REDO: CPT | Performed by: PATHOLOGY

## 2025-01-28 PROCEDURE — 99245 OFF/OP CONSLTJ NEW/EST HI 55: CPT | Performed by: OBSTETRICS & GYNECOLOGY

## 2025-01-28 PROCEDURE — 87624 HPV HI-RISK TYP POOLED RSLT: CPT | Performed by: OBSTETRICS & GYNECOLOGY

## 2025-01-28 RX ORDER — AMLODIPINE BESYLATE 5 MG/1
TABLET ORAL
Status: ON HOLD | COMMUNITY
Start: 2025-01-28

## 2025-01-28 RX ORDER — METRONIDAZOLE 500 MG/100ML
500 INJECTION, SOLUTION INTRAVENOUS ONCE
Status: CANCELLED | OUTPATIENT
Start: 2025-02-05 | End: 2025-01-28

## 2025-01-28 RX ORDER — ACETAMINOPHEN 10 MG/ML
1000 INJECTION, SOLUTION INTRAVENOUS ONCE
Status: CANCELLED | OUTPATIENT
Start: 2025-02-05 | End: 2025-01-28

## 2025-01-28 RX ORDER — CEFAZOLIN SODIUM 2 G/50ML
2000 SOLUTION INTRAVENOUS ONCE
Status: CANCELLED | OUTPATIENT
Start: 2025-02-05 | End: 2025-01-28

## 2025-01-28 NOTE — H&P (VIEW-ONLY)
Name: Esther Gonzalez      : 1970      MRN: [id number]  Encounter Provider: Eusebio Valderrama MD  Encounter Date: 2025   Encounter department: [facility] [place]  :  Assessment & Plan  Abdominal pain    Orders:  •  Ambulatory Referral to Gynecologic Oncology    Adnexal mass  I have independently obtained history from patient today.  I have reviewed CT scan images which demonstrate the presence of a 17 cm complex smooth-surfaced pelvic mass.  Absent uterus.  Minimal conspicuity of the omentum without large measurable implants.  Mild to moderate free fluid.    I discussed with patient differential diagnosis including borderline and malignant pathology.  I have recommended and she has consented to proceed with definitive surgical intervention by means of exam under anesthesia, diagnostic laparoscopy, exploratory laparotomy, bilateral salpingo-oophorectomy (status post prior supracervical hysterectomy), staging, tumor debulking and all other indicated procedures.    Patient has good exercise tolerance and no additional cardiovascular workup is indicated.  She will take her antihypertensives in the morning of surgery but will hold lisinopril specifically 24 hours prior to the OR.  Surgical instructions as per procedure pass.  Perioperative care as per ERAS.    The patient was counseled regarding indications, risks, benefits and alternatives to surgical management.  We discussed risks including but not limited to bleeding and potential need for blood transfusions, infection, pain, injury to surrounding organs such as bladder, intestines, ureters and neurovascular structures.  Patient understands potential risks associated with stress of surgery and general anesthesia including but not limited to acute cardiac events, venous thromboembolism, etc.  Patient consents for blood transfusions if those are deemed necessary during the course of care.  She understands risks include but  are not limited to hypersensitivity reactions and infection with blood-borne pathogens.  Patient verbalizes understanding, agrees and wants to proceed.  Informed consent form signed. All questions answered to patient's satisfaction.      We briefly discussed the possible need for systemic chemotherapy if malignancy is identified.  Final recommendations pending final pathology results.      Orders:  •  Case request operating room: Exam under anesthesia, diagnostic laparoscopy, exploratory laparotomy,bilateral salpingo-oophorectomy, possible staging and tumor debulking and all other indicated procedures; Standing  •  Type and screen; Future    Cervical cancer screening    Orders:  •  Liquid-based pap, diagnostic    Class 2 obesity with body mass index (BMI) of 36.0 to 36.9 in adult, unspecified obesity type, unspecified whether serious comorbidity present  She has been prescribed GLP-1 agonists which she is not taking.  Consider referral to weight management program postoperatively pending results and status.       Essential hypertension  Well-controlled on 2 agents.  Will hold ACE inhibitor 24 hours prior to surgery.               History of Present Illness   Reason for Visit / CC: Consultation for large complex pelvic mass   Esther Gonzalez is a 54 y.o. female   54-year-old grand multiparous with 4 prior vaginal deliveries and 1 prior  section.  She is status post supracervical hysterectomy for fibroids and abnormal uterine bleeding.  Now with persistent/recurrent abdominal pelvic discomfort.  Emergency room visit included CT scan which demonstrated the presence of a 17 cm complex pelvic mass.  Patient was referred for evaluation and treatment.   noted to be elevated greater than 100 units/mL.  Denies vaginal bleeding, drainage or discharge.  No recent Paps.  Reportedly up-to-date with Cologuard testing.  Last mammogram approximately 2 years ago.  Denies family history of cancer.      Pertinent  Medical History   Obesity, hypertension.        Review of Systems A complete review of systems is negative other than that noted above in the HPI.  Past Medical History   Past Medical History:   Diagnosis Date   • Asthma    • Breathing difficulty     post issues with difficulty breathing   • GERD (gastroesophageal reflux disease)    • Hypertension    • Lumbar radiculopathy 2016     Past Surgical History:   Procedure Laterality Date   • BREAST BIOPSY Left 06/10/2021    us bx- neg   •  SECTION     • CYSTOSCOPY N/A 2021    Procedure: CYSTOSCOPY;  Surgeon: Gustavo Murillo DO;  Location: [facility];  Service: Gynecology   • OH LAPS SUPRACRV HYSTERECT 250 GM/< RMVL TUBE/OVAR N/A 2021    Procedure: LSH WITH B/L SALPINGECTOMY;  Surgeon: Gustavo Murillo DO;  Location: [facility];  Service: Gynecology   • REDUCTION MAMMAPLASTY Bilateral    • US GUIDED BREAST BIOPSY LEFT COMPLETE Left 6/10/2021     Family History   Problem Relation Age of Onset   • Hypertension Mother    • Diabetes Father    • No Known Problems Sister    • No Known Problems Daughter    • No Known Problems Maternal Grandmother    • No Known Problems Maternal Grandfather    • No Known Problems Paternal Grandmother    • No Known Problems Paternal Grandfather    • No Known Problems Sister    • No Known Problems Sister    • No Known Problems Sister    • No Known Problems Sister    • No Known Problems Daughter    • No Known Problems Daughter    • No Known Problems Paternal Aunt    • No Known Problems Son    • No Known Problems Son       reports that she has never smoked. She has never used smokeless tobacco. She reports current alcohol use. She reports that she does not use drugs.  Current Outpatient Medications on File Prior to Visit   Medication Sig Dispense Refill   • acetaminophen (TYLENOL) 325 mg tablet Take 2 tablets (650 mg total) by mouth every 6 (six) hours as needed for mild pain 30 tablet 0   • amLODIPine (NORVASC) 5 mg  tablet      • Blood Pressure Monitoring (Blood Pressure Cuff) MISC Use 2 (two) times a day Omron, Upper arm, large cuff 1 each 0   • carboxymethylcellulose 1 % ophthalmic solution Apply 1 drop to eye Three times a day     • hyoscyamine (ANASPAZ,LEVSIN) 0.125 MG tablet Take 1 tablet (0.125 mg total) by mouth every 4 (four) hours as needed for cramping 30 tablet 0   • lisinopril (ZESTRIL) 40 mg tablet Take 1 tablet (40 mg total) by mouth daily 90 tablet 0   • oxyCODONE-acetaminophen (PERCOCET) 5-325 mg per tablet Take 1 tablet by mouth every 4 (four) hours as needed for severe pain for up to 15 doses Max Daily Amount: 6 tablets (Patient not taking: Reported on 1/28/2025) 15 tablet 0   • [DISCONTINUED] carvedilol (COREG) 12.5 mg tablet TAKE 1 TABLET BY MOUTH TWICE A DAY WITH FOOD (Patient not taking: Reported on 1/28/2025) 180 tablet 1   • [DISCONTINUED] hydrochlorothiazide (HYDRODIURIL) 25 mg tablet TAKE 1 TABLET (25 MG TOTAL) BY MOUTH DAILY. (Patient not taking: Reported on 1/28/2025) 90 tablet 1   • [DISCONTINUED] Insulin Pen Needle (BD Pen Needle Rajani U/F) 32G X 4 MM MISC Use daily as directed with insulin pen (Patient not taking: Reported on 1/28/2025) 100 each 3   • [DISCONTINUED] lidocaine (Lidoderm) 5 % Apply 1 patch topically daily Remove & Discard patch within 12 hours or as directed by MD (Patient not taking: Reported on 1/28/2025) 30 patch 0   • [DISCONTINUED] liraglutide (SAXENDA) injection Inject 0.1 mL (0.6 mg total) under the skin daily Use 0.6 mg every day, you can increase dose by 0.6 mg every 7 days until maximum dose of 3 mg or until side effects. (Patient not taking: Reported on 1/28/2025) 3 mL 1   • [DISCONTINUED] methocarbamol (ROBAXIN) 500 mg tablet Take 1 tablet (500 mg total) by mouth 2 (two) times a day (Patient not taking: Reported on 1/28/2025) 20 tablet 0   • [DISCONTINUED] ondansetron (ZOFRAN) 4 mg tablet Take 1 tablet (4 mg total) by mouth every 6 (six) hours (Patient not taking:  Reported on 1/28/2025) 12 tablet 0   • [DISCONTINUED] sucralfate (CARAFATE) 1 g/10 mL suspension Take 10 mL (1 g total) by mouth 4 (four) times a day for 7 days (Patient not taking: Reported on 1/28/2025) 280 mL 0     No current facility-administered medications on file prior to visit.     Allergies   Allergen Reactions   • Aspirin Other (See Comments)     Reaction Date: 17Apr2012;   Chest tight   • Calcium Carbonate      Reaction Date: 17Apr2012;    • Ibuprofen      Reaction Date: 17Apr2012;    • Penicillins Swelling         Objective   /84 (BP Location: Left arm, Patient Position: Sitting, Cuff Size: Large)   Pulse (!) 118   Temp 98.4 °F (36.9 °C) (Temporal)   Wt 93.4 kg (206 lb)   LMP 04/11/2021   SpO2 98%   BMI 37.08 kg/m²     Body mass index is 37.08 kg/m².  Pain Screening:  Pain Score: 0-No pain  ECOG ECOG Performance Status: 0 - Fully active, able to carry on all pre-disease performance without restriction   Physical Exam  Vitals reviewed. Exam conducted with a chaperone present.   Constitutional:       General: She is not in acute distress.     Appearance: Normal appearance. She is not toxic-appearing.   Cardiovascular:      Rate and Rhythm: Normal rate and regular rhythm.      Heart sounds: Normal heart sounds. No murmur heard.  Pulmonary:      Effort: Pulmonary effort is normal. No respiratory distress.      Breath sounds: Normal breath sounds. No stridor. No wheezing.   Abdominal:      General: Abdomen is flat.      Palpations: Abdomen is soft. There is mass.      Tenderness: There is abdominal tenderness. There is no guarding or rebound.      Hernia: No hernia is present.      Comments: Arch pelvic mass approaches the level of the umbilicus.   Genitourinary:     Comments: Normal external female genitalia. Normal Bartholin's and Ivyland's glands. Normal urethral meatus and no evidence of urethral discharge or masses.  Bladder without fullness mass or tenderness. Vagina without lesion or  discharge. No significant pelvic organ prolapse noted. Cervix grossly normal appearing without visible lesions.  Bimanual exam demonstrates presence of a smooth, mobile and nontender pelvic mass extending to approximately the level of the umbilicus.  Anus without fissure of lesion.    Musculoskeletal:      Right lower leg: No edema.      Left lower leg: No edema.   Neurological:      Mental Status: She is alert.            Labs: I have reviewed pertinent labs.   Lab Results   Component Value Date/Time     182.5 (H) 01/22/2025 01:50 PM     Lab Results   Component Value Date/Time    Potassium 3.7 01/22/2025 01:50 PM    Potassium 4.3 05/21/2024 10:16 AM    Chloride 96 01/22/2025 01:50 PM    Chloride 105 05/21/2024 10:16 AM    Carbon Dioxide 23 05/21/2024 10:16 AM    CO2 29 01/22/2025 01:50 PM    BUN 11 01/22/2025 01:50 PM    BUN 9 05/21/2024 10:16 AM    Creatinine 0.97 01/22/2025 01:50 PM    Creatinine 0.88 05/21/2024 10:16 AM    Calcium 9.9 01/22/2025 01:50 PM    Calcium 9.8 05/21/2024 10:16 AM    AST 15 01/22/2025 01:50 PM    ALT 11 01/22/2025 01:50 PM    Alkaline Phosphatase 74 01/22/2025 01:50 PM    eGFRcr 78 05/21/2024 10:16 AM    eGFR 65 01/22/2025 01:50 PM     Lab Results   Component Value Date/Time    WBC 9.63 01/22/2025 01:50 PM    Hemoglobin 14.0 01/22/2025 01:50 PM    Hematocrit 41.3 01/22/2025 01:50 PM    MCV 89 01/22/2025 01:50 PM    Platelets 456 (H) 01/22/2025 01:50 PM     Lab Results   Component Value Date/Time    Absolute Neutrophils 5.27 01/22/2025 01:50 PM        Trend:  Lab Results   Component Value Date     182.5 (H) 01/22/2025       Radiology Results Review : I have reviewed images/report studies in PACS as described above (in the HPI).  Other Study Results Review : Other studies reviewed include: CBC, CMP, .    Eusebio Valderrama MD, [place]USTUS, FACOG, FACS  1/28/2025  3:35 PM

## 2025-01-28 NOTE — PROGRESS NOTES
[facility]  Documentation of Informed Consent      I, Kamini Moreno, Clinical Research Coordinator, met with Esther Gonzalez and/or on Date: 1/28/2025 at Time: 3:40pm to conduct the informed consent process for enrollment into Research Study: [employer] 2021-05 . Kamini Moreno and Esther Gonzalez were present during the consent discussion and the signing of the consent form.   The current IRB approved informed consent form was reviewed in its entirety. Esther Gonzalez was given sufficient time to review the study information and to ask questions, and all questions were answered to the satisfaction of Esther Gonzalez. A copy of the signed informed consent form was provided to Esther Gonzalez. The informed consent document was signed before any research procedures were performed.   Blood draw unsuccessful, blood will be obtained prior to surgery planned with Dr. Valderrama.

## 2025-01-28 NOTE — ASSESSMENT & PLAN NOTE
I have independently obtained history from patient today.  I have reviewed CT scan images which demonstrate the presence of a 17 cm complex smooth-surfaced pelvic mass.  Absent uterus.  Minimal conspicuity of the omentum without large measurable implants.  Mild to moderate free fluid.    I discussed with patient differential diagnosis including borderline and malignant pathology.  I have recommended and she has consented to proceed with definitive surgical intervention by means of exam under anesthesia, diagnostic laparoscopy, exploratory laparotomy, bilateral salpingo-oophorectomy (status post prior supracervical hysterectomy), staging, tumor debulking and all other indicated procedures.    Patient has good exercise tolerance and no additional cardiovascular workup is indicated.  She will take her antihypertensives in the morning of surgery but will hold lisinopril specifically 24 hours prior to the OR.  Surgical instructions as per procedure pass.  Perioperative care as per ERAS.    The patient was counseled regarding indications, risks, benefits and alternatives to surgical management.  We discussed risks including but not limited to bleeding and potential need for blood transfusions, infection, pain, injury to surrounding organs such as bladder, intestines, ureters and neurovascular structures.  Patient understands potential risks associated with stress of surgery and general anesthesia including but not limited to acute cardiac events, venous thromboembolism, etc.  Patient consents for blood transfusions if those are deemed necessary during the course of care.  She understands risks include but are not limited to hypersensitivity reactions and infection with blood-borne pathogens.  Patient verbalizes understanding, agrees and wants to proceed.  Informed consent form signed. All questions answered to patient's satisfaction.      We briefly discussed the possible need for systemic chemotherapy if malignancy is  identified.  Final recommendations pending final pathology results.      Orders:  •  Case request operating room: Exam under anesthesia, diagnostic laparoscopy, exploratory laparotomy,bilateral salpingo-oophorectomy, possible staging and tumor debulking and all other indicated procedures; Standing  •  Type and screen; Future

## 2025-01-28 NOTE — ASSESSMENT & PLAN NOTE
She has been prescribed GLP-1 agonists which she is not taking.  Consider referral to weight management program postoperatively pending results and status.

## 2025-01-28 NOTE — PROGRESS NOTES
Name: Esther Gonzalez      : 1970      MRN: [id number]  Encounter Provider: Eusebio Valderrama MD  Encounter Date: 2025   Encounter department: [facility]  :  Assessment & Plan  Abdominal pain    Orders:  •  Ambulatory Referral to Gynecologic Oncology    Adnexal mass  I have independently obtained history from patient today.  I have reviewed CT scan images which demonstrate the presence of a 17 cm complex smooth-surfaced pelvic mass.  Absent uterus.  Minimal conspicuity of the omentum without large measurable implants.  Mild to moderate free fluid.    I discussed with patient differential diagnosis including borderline and malignant pathology.  I have recommended and she has consented to proceed with definitive surgical intervention by means of exam under anesthesia, diagnostic laparoscopy, exploratory laparotomy, bilateral salpingo-oophorectomy (status post prior supracervical hysterectomy), staging, tumor debulking and all other indicated procedures.    Patient has good exercise tolerance and no additional cardiovascular workup is indicated.  She will take her antihypertensives in the morning of surgery but will hold lisinopril specifically 24 hours prior to the OR.  Surgical instructions as per procedure pass.  Perioperative care as per ERAS.    The patient was counseled regarding indications, risks, benefits and alternatives to surgical management.  We discussed risks including but not limited to bleeding and potential need for blood transfusions, infection, pain, injury to surrounding organs such as bladder, intestines, ureters and neurovascular structures.  Patient understands potential risks associated with stress of surgery and general anesthesia including but not limited to acute cardiac events, venous thromboembolism, etc.  Patient consents for blood transfusions if those are deemed necessary during the course of care.  She understands risks include but  are not limited to hypersensitivity reactions and infection with blood-borne pathogens.  Patient verbalizes understanding, agrees and wants to proceed.  Informed consent form signed. All questions answered to patient's satisfaction.      We briefly discussed the possible need for systemic chemotherapy if malignancy is identified.  Final recommendations pending final pathology results.      Orders:  •  Case request operating room: Exam under anesthesia, diagnostic laparoscopy, exploratory laparotomy,bilateral salpingo-oophorectomy, possible staging and tumor debulking and all other indicated procedures; Standing  •  Type and screen; Future    Cervical cancer screening    Orders:  •  Liquid-based pap, diagnostic    Class 2 obesity with body mass index (BMI) of 36.0 to 36.9 in adult, unspecified obesity type, unspecified whether serious comorbidity present  She has been prescribed GLP-1 agonists which she is not taking.  Consider referral to weight management program postoperatively pending results and status.       Essential hypertension  Well-controlled on 2 agents.  Will hold ACE inhibitor 24 hours prior to surgery.               History of Present Illness   Reason for Visit / CC: Consultation for large complex pelvic mass   Esther Gonzalez is a 54 y.o. female   54-year-old grand multiparous with 4 prior vaginal deliveries and 1 prior  section.  She is status post supracervical hysterectomy for fibroids and abnormal uterine bleeding.  Now with persistent/recurrent abdominal pelvic discomfort.  Emergency room visit included CT scan which demonstrated the presence of a 17 cm complex pelvic mass.  Patient was referred for evaluation and treatment.   noted to be elevated greater than 100 units/mL.  Denies vaginal bleeding, drainage or discharge.  No recent Paps.  Reportedly up-to-date with Cologuard testing.  Last mammogram approximately 2 years ago.  Denies family history of cancer.      Pertinent  Medical History   Obesity, hypertension.        Review of Systems A complete review of systems is negative other than that noted above in the HPI.  Past Medical History   Past Medical History:   Diagnosis Date   • Asthma    • Breathing difficulty     post issues with difficulty breathing   • GERD (gastroesophageal reflux disease)    • Hypertension    • Lumbar radiculopathy 2016     Past Surgical History:   Procedure Laterality Date   • BREAST BIOPSY Left 06/10/2021    us bx- neg   •  SECTION     • CYSTOSCOPY N/A 2021    Procedure: CYSTOSCOPY;  Surgeon: Gustavo Murillo DO;  Location: [facility];  Service: Gynecology   • DC LAPS SUPRACRV HYSTERECT 250 GM/< RMVL TUBE/OVAR N/A 2021    Procedure: LSH WITH B/L SALPINGECTOMY;  Surgeon: Gustavo Murillo DO;  Location: [facility];  Service: Gynecology   • REDUCTION MAMMAPLASTY Bilateral    • US GUIDED BREAST BIOPSY LEFT COMPLETE Left 6/10/2021     Family History   Problem Relation Age of Onset   • Hypertension Mother    • Diabetes Father    • No Known Problems Sister    • No Known Problems Daughter    • No Known Problems Maternal Grandmother    • No Known Problems Maternal Grandfather    • No Known Problems Paternal Grandmother    • No Known Problems Paternal Grandfather    • No Known Problems Sister    • No Known Problems Sister    • No Known Problems Sister    • No Known Problems Sister    • No Known Problems Daughter    • No Known Problems Daughter    • No Known Problems Paternal Aunt    • No Known Problems Son    • No Known Problems Son       reports that she has never smoked. She has never used smokeless tobacco. She reports current alcohol use. She reports that she does not use drugs.  Current Outpatient Medications on File Prior to Visit   Medication Sig Dispense Refill   • acetaminophen (TYLENOL) 325 mg tablet Take 2 tablets (650 mg total) by mouth every 6 (six) hours as needed for mild pain 30 tablet 0   • amLODIPine (NORVASC) 5 mg  tablet      • Blood Pressure Monitoring (Blood Pressure Cuff) MISC Use 2 (two) times a day Omron, Upper arm, large cuff 1 each 0   • carboxymethylcellulose 1 % ophthalmic solution Apply 1 drop to eye Three times a day     • hyoscyamine (ANASPAZ,LEVSIN) 0.125 MG tablet Take 1 tablet (0.125 mg total) by mouth every 4 (four) hours as needed for cramping 30 tablet 0   • lisinopril (ZESTRIL) 40 mg tablet Take 1 tablet (40 mg total) by mouth daily 90 tablet 0   • oxyCODONE-acetaminophen (PERCOCET) 5-325 mg per tablet Take 1 tablet by mouth every 4 (four) hours as needed for severe pain for up to 15 doses Max Daily Amount: 6 tablets (Patient not taking: Reported on 1/28/2025) 15 tablet 0   • [DISCONTINUED] carvedilol (COREG) 12.5 mg tablet TAKE 1 TABLET BY MOUTH TWICE A DAY WITH FOOD (Patient not taking: Reported on 1/28/2025) 180 tablet 1   • [DISCONTINUED] hydrochlorothiazide (HYDRODIURIL) 25 mg tablet TAKE 1 TABLET (25 MG TOTAL) BY MOUTH DAILY. (Patient not taking: Reported on 1/28/2025) 90 tablet 1   • [DISCONTINUED] Insulin Pen Needle (BD Pen Needle Rajani U/F) 32G X 4 MM MISC Use daily as directed with insulin pen (Patient not taking: Reported on 1/28/2025) 100 each 3   • [DISCONTINUED] lidocaine (Lidoderm) 5 % Apply 1 patch topically daily Remove & Discard patch within 12 hours or as directed by MD (Patient not taking: Reported on 1/28/2025) 30 patch 0   • [DISCONTINUED] liraglutide (SAXENDA) injection Inject 0.1 mL (0.6 mg total) under the skin daily Use 0.6 mg every day, you can increase dose by 0.6 mg every 7 days until maximum dose of 3 mg or until side effects. (Patient not taking: Reported on 1/28/2025) 3 mL 1   • [DISCONTINUED] methocarbamol (ROBAXIN) 500 mg tablet Take 1 tablet (500 mg total) by mouth 2 (two) times a day (Patient not taking: Reported on 1/28/2025) 20 tablet 0   • [DISCONTINUED] ondansetron (ZOFRAN) 4 mg tablet Take 1 tablet (4 mg total) by mouth every 6 (six) hours (Patient not taking:  Reported on 1/28/2025) 12 tablet 0   • [DISCONTINUED] sucralfate (CARAFATE) 1 g/10 mL suspension Take 10 mL (1 g total) by mouth 4 (four) times a day for 7 days (Patient not taking: Reported on 1/28/2025) 280 mL 0     No current facility-administered medications on file prior to visit.     Allergies   Allergen Reactions   • Aspirin Other (See Comments)     Reaction Date: 17Apr2012;   Chest tight   • Calcium Carbonate      Reaction Date: 17Apr2012;    • Ibuprofen      Reaction Date: 17Apr2012;    • Penicillins Swelling         Objective   /84 (BP Location: Left arm, Patient Position: Sitting, Cuff Size: Large)   Pulse (!) 118   Temp 98.4 °F (36.9 °C) (Temporal)   Wt 93.4 kg (206 lb)   LMP 04/11/2021   SpO2 98%   BMI 37.08 kg/m²     Body mass index is 37.08 kg/m².  Pain Screening:  Pain Score: 0-No pain  ECOG ECOG Performance Status: 0 - Fully active, able to carry on all pre-disease performance without restriction   Physical Exam  Vitals reviewed. Exam conducted with a chaperone present.   Constitutional:       General: She is not in acute distress.     Appearance: Normal appearance. She is not toxic-appearing.   Cardiovascular:      Rate and Rhythm: Normal rate and regular rhythm.      Heart sounds: Normal heart sounds. No murmur heard.  Pulmonary:      Effort: Pulmonary effort is normal. No respiratory distress.      Breath sounds: Normal breath sounds. No stridor. No wheezing.   Abdominal:      General: Abdomen is flat.      Palpations: Abdomen is soft. There is mass.      Tenderness: There is abdominal tenderness. There is no guarding or rebound.      Hernia: No hernia is present.      Comments: Arch pelvic mass approaches the level of the umbilicus.   Genitourinary:     Comments: Normal external female genitalia. Normal Bartholin's and Tenaha's glands. Normal urethral meatus and no evidence of urethral discharge or masses.  Bladder without fullness mass or tenderness. Vagina without lesion or  discharge. No significant pelvic organ prolapse noted. Cervix grossly normal appearing without visible lesions.  Bimanual exam demonstrates presence of a smooth, mobile and nontender pelvic mass extending to approximately the level of the umbilicus.  Anus without fissure of lesion.    Musculoskeletal:      Right lower leg: No edema.      Left lower leg: No edema.   Neurological:      Mental Status: She is alert.            Labs: I have reviewed pertinent labs.   Lab Results   Component Value Date/Time     182.5 (H) 01/22/2025 01:50 PM     Lab Results   Component Value Date/Time    Potassium 3.7 01/22/2025 01:50 PM    Potassium 4.3 05/21/2024 10:16 AM    Chloride 96 01/22/2025 01:50 PM    Chloride 105 05/21/2024 10:16 AM    Carbon Dioxide 23 05/21/2024 10:16 AM    CO2 29 01/22/2025 01:50 PM    BUN 11 01/22/2025 01:50 PM    BUN 9 05/21/2024 10:16 AM    Creatinine 0.97 01/22/2025 01:50 PM    Creatinine 0.88 05/21/2024 10:16 AM    Calcium 9.9 01/22/2025 01:50 PM    Calcium 9.8 05/21/2024 10:16 AM    AST 15 01/22/2025 01:50 PM    ALT 11 01/22/2025 01:50 PM    Alkaline Phosphatase 74 01/22/2025 01:50 PM    eGFRcr 78 05/21/2024 10:16 AM    eGFR 65 01/22/2025 01:50 PM     Lab Results   Component Value Date/Time    WBC 9.63 01/22/2025 01:50 PM    Hemoglobin 14.0 01/22/2025 01:50 PM    Hematocrit 41.3 01/22/2025 01:50 PM    MCV 89 01/22/2025 01:50 PM    Platelets 456 (H) 01/22/2025 01:50 PM     Lab Results   Component Value Date/Time    Absolute Neutrophils 5.27 01/22/2025 01:50 PM        Trend:  Lab Results   Component Value Date     182.5 (H) 01/22/2025       Radiology Results Review : I have reviewed images/report studies in PACS as described above (in the HPI).  Other Study Results Review : Other studies reviewed include: CBC, CMP, .    Eusebio Valderrama MD, AUGUSTUS, FACOG, FACS  1/28/2025  3:35 PM

## 2025-01-29 ENCOUNTER — TELEPHONE (OUTPATIENT)
Dept: GYNECOLOGIC ONCOLOGY | Facility: CLINIC | Age: 55
End: 2025-01-29

## 2025-01-29 LAB
HPV HR 12 DNA CVX QL NAA+PROBE: NEGATIVE
HPV16 DNA CVX QL NAA+PROBE: POSITIVE
HPV18 DNA CVX QL NAA+PROBE: NEGATIVE

## 2025-01-29 NOTE — TELEPHONE ENCOUNTER
Phoned patient again to obtain new insurance information.  Call back number provided -[phone].

## 2025-01-29 NOTE — TELEPHONE ENCOUNTER
Phoned patient, lmm regarding need for insurance information, ASAP.  Call back number provided - [phone].

## 2025-01-30 NOTE — TELEPHONE ENCOUNTER
Phoned patient, lmm regarding need for new insurance information.  Informed patient information will be needed by end of day today to avoid canceling upcoming surgical procedure scheduled for 2.5.2025.  Call back number provided.

## 2025-02-03 ENCOUNTER — APPOINTMENT (OUTPATIENT)
Dept: LAB | Facility: HOSPITAL | Age: 55
End: 2025-02-03
Payer: COMMERCIAL

## 2025-02-03 DIAGNOSIS — N94.89 ADNEXAL MASS: ICD-10-CM

## 2025-02-03 LAB
ABO GROUP BLD: NORMAL
BLD GP AB SCN SERPL QL: NEGATIVE
RH BLD: POSITIVE
SPECIMEN EXPIRATION DATE: NORMAL

## 2025-02-03 PROCEDURE — 36415 COLL VENOUS BLD VENIPUNCTURE: CPT

## 2025-02-03 PROCEDURE — 86850 RBC ANTIBODY SCREEN: CPT

## 2025-02-03 PROCEDURE — 86901 BLOOD TYPING SEROLOGIC RH(D): CPT

## 2025-02-03 PROCEDURE — 86900 BLOOD TYPING SEROLOGIC ABO: CPT

## 2025-02-04 ENCOUNTER — ANESTHESIA EVENT (OUTPATIENT)
Dept: PERIOP | Facility: HOSPITAL | Age: 55
DRG: 737 | End: 2025-02-04
Payer: COMMERCIAL

## 2025-02-05 ENCOUNTER — ANESTHESIA (OUTPATIENT)
Dept: PERIOP | Facility: HOSPITAL | Age: 55
DRG: 737 | End: 2025-02-05
Payer: COMMERCIAL

## 2025-02-05 ENCOUNTER — HOSPITAL ENCOUNTER (INPATIENT)
Facility: HOSPITAL | Age: 55
LOS: 5 days | Discharge: HOME | DRG: 737 | End: 2025-02-10
Attending: OBSTETRICS & GYNECOLOGY | Admitting: OBSTETRICS & GYNECOLOGY
Payer: COMMERCIAL

## 2025-02-05 DIAGNOSIS — N94.89 ADNEXAL MASS: ICD-10-CM

## 2025-02-05 DIAGNOSIS — Z98.890 HX OF LAPAROSCOPY: ICD-10-CM

## 2025-02-05 DIAGNOSIS — C56.1 MALIGNANT NEOPLASM OF RIGHT OVARY (HCC): Primary | ICD-10-CM

## 2025-02-05 LAB
LAB AP GYN PRIMARY INTERPRETATION: ABNORMAL
Lab: ABNORMAL
PATH INTERP SPEC-IMP: ABNORMAL

## 2025-02-05 PROCEDURE — 00HU33Z INSERTION OF INFUSION DEVICE INTO SPINAL CANAL, PERCUTANEOUS APPROACH: ICD-10-PCS | Performed by: OBSTETRICS & GYNECOLOGY

## 2025-02-05 PROCEDURE — 88305 TISSUE EXAM BY PATHOLOGIST: CPT | Performed by: STUDENT IN AN ORGANIZED HEALTH CARE EDUCATION/TRAINING PROGRAM

## 2025-02-05 PROCEDURE — 0DTJ4ZZ RESECTION OF APPENDIX, PERCUTANEOUS ENDOSCOPIC APPROACH: ICD-10-PCS | Performed by: OBSTETRICS & GYNECOLOGY

## 2025-02-05 PROCEDURE — 88331 PATH CONSLTJ SURG 1 BLK 1SPC: CPT | Performed by: OBSTETRICS & GYNECOLOGY

## 2025-02-05 PROCEDURE — 58661 LAPAROSCOPY REMOVE ADNEXA: CPT | Performed by: OBSTETRICS & GYNECOLOGY

## 2025-02-05 PROCEDURE — 88305 TISSUE EXAM BY PATHOLOGIST: CPT | Performed by: PATHOLOGY

## 2025-02-05 PROCEDURE — 0WBN4ZX EXCISION OF FEMALE PERINEUM, PERCUTANEOUS ENDOSCOPIC APPROACH, DIAGNOSTIC: ICD-10-PCS | Performed by: OBSTETRICS & GYNECOLOGY

## 2025-02-05 PROCEDURE — 88342 IMHCHEM/IMCYTCHM 1ST ANTB: CPT | Performed by: PATHOLOGY

## 2025-02-05 PROCEDURE — 88112 CYTOPATH CELL ENHANCE TECH: CPT | Performed by: PATHOLOGY

## 2025-02-05 PROCEDURE — 07BD4ZZ EXCISION OF AORTIC LYMPHATIC, PERCUTANEOUS ENDOSCOPIC APPROACH: ICD-10-PCS | Performed by: OBSTETRICS & GYNECOLOGY

## 2025-02-05 PROCEDURE — 88341 IMHCHEM/IMCYTCHM EA ADD ANTB: CPT | Performed by: PATHOLOGY

## 2025-02-05 PROCEDURE — 0UT54ZZ RESECTION OF RIGHT FALLOPIAN TUBE, PERCUTANEOUS ENDOSCOPIC APPROACH: ICD-10-PCS | Performed by: OBSTETRICS & GYNECOLOGY

## 2025-02-05 PROCEDURE — 88342 IMHCHEM/IMCYTCHM 1ST ANTB: CPT | Performed by: STUDENT IN AN ORGANIZED HEALTH CARE EDUCATION/TRAINING PROGRAM

## 2025-02-05 PROCEDURE — 88341 IMHCHEM/IMCYTCHM EA ADD ANTB: CPT | Performed by: STUDENT IN AN ORGANIZED HEALTH CARE EDUCATION/TRAINING PROGRAM

## 2025-02-05 PROCEDURE — G0124 SCREEN C/V THIN LAYER BY MD: HCPCS | Performed by: PATHOLOGY

## 2025-02-05 PROCEDURE — 38570 LAPAROSCOPY LYMPH NODE BIOP: CPT | Performed by: OBSTETRICS & GYNECOLOGY

## 2025-02-05 PROCEDURE — 0UT24ZZ RESECTION OF BILATERAL OVARIES, PERCUTANEOUS ENDOSCOPIC APPROACH: ICD-10-PCS | Performed by: OBSTETRICS & GYNECOLOGY

## 2025-02-05 PROCEDURE — 0DBU4ZZ EXCISION OF OMENTUM, PERCUTANEOUS ENDOSCOPIC APPROACH: ICD-10-PCS | Performed by: OBSTETRICS & GYNECOLOGY

## 2025-02-05 PROCEDURE — 88302 TISSUE EXAM BY PATHOLOGIST: CPT | Performed by: STUDENT IN AN ORGANIZED HEALTH CARE EDUCATION/TRAINING PROGRAM

## 2025-02-05 PROCEDURE — 58662 LAPAROSCOPY EXCISE LESIONS: CPT | Performed by: OBSTETRICS & GYNECOLOGY

## 2025-02-05 PROCEDURE — 88331 PATH CONSLTJ SURG 1 BLK 1SPC: CPT | Performed by: STUDENT IN AN ORGANIZED HEALTH CARE EDUCATION/TRAINING PROGRAM

## 2025-02-05 PROCEDURE — 07BC4ZZ EXCISION OF PELVIS LYMPHATIC, PERCUTANEOUS ENDOSCOPIC APPROACH: ICD-10-PCS | Performed by: OBSTETRICS & GYNECOLOGY

## 2025-02-05 PROCEDURE — 88307 TISSUE EXAM BY PATHOLOGIST: CPT | Performed by: STUDENT IN AN ORGANIZED HEALTH CARE EDUCATION/TRAINING PROGRAM

## 2025-02-05 RX ORDER — MAGNESIUM HYDROXIDE 1200 MG/15ML
LIQUID ORAL AS NEEDED
Status: DISCONTINUED | OUTPATIENT
Start: 2025-02-05 | End: 2025-02-05 | Stop reason: HOSPADM

## 2025-02-05 RX ORDER — MIDAZOLAM HYDROCHLORIDE 2 MG/2ML
INJECTION, SOLUTION INTRAMUSCULAR; INTRAVENOUS AS NEEDED
Status: DISCONTINUED | OUTPATIENT
Start: 2025-02-05 | End: 2025-02-05

## 2025-02-05 RX ORDER — GLYCOPYRROLATE 0.2 MG/ML
INJECTION INTRAMUSCULAR; INTRAVENOUS AS NEEDED
Status: DISCONTINUED | OUTPATIENT
Start: 2025-02-05 | End: 2025-02-05

## 2025-02-05 RX ORDER — HEPARIN SODIUM 5000 [USP'U]/ML
INJECTION, SOLUTION INTRAVENOUS; SUBCUTANEOUS AS NEEDED
Status: DISCONTINUED | OUTPATIENT
Start: 2025-02-05 | End: 2025-02-05

## 2025-02-05 RX ORDER — SODIUM CHLORIDE 9 MG/ML
INJECTION, SOLUTION INTRAVENOUS CONTINUOUS PRN
Status: DISCONTINUED | OUTPATIENT
Start: 2025-02-05 | End: 2025-02-05

## 2025-02-05 RX ORDER — PROPOFOL 10 MG/ML
INJECTION, EMULSION INTRAVENOUS CONTINUOUS PRN
Status: DISCONTINUED | OUTPATIENT
Start: 2025-02-05 | End: 2025-02-05

## 2025-02-05 RX ORDER — FENTANYL CITRATE 50 UG/ML
INJECTION, SOLUTION INTRAMUSCULAR; INTRAVENOUS AS NEEDED
Status: DISCONTINUED | OUTPATIENT
Start: 2025-02-05 | End: 2025-02-05

## 2025-02-05 RX ORDER — CEFAZOLIN SODIUM 2 G/50ML
2000 SOLUTION INTRAVENOUS ONCE
Status: COMPLETED | OUTPATIENT
Start: 2025-02-05 | End: 2025-02-05

## 2025-02-05 RX ORDER — METRONIDAZOLE 500 MG/100ML
500 INJECTION, SOLUTION INTRAVENOUS ONCE
Status: COMPLETED | OUTPATIENT
Start: 2025-02-05 | End: 2025-02-05

## 2025-02-05 RX ORDER — ROCURONIUM BROMIDE 10 MG/ML
INJECTION, SOLUTION INTRAVENOUS AS NEEDED
Status: DISCONTINUED | OUTPATIENT
Start: 2025-02-05 | End: 2025-02-05

## 2025-02-05 RX ORDER — ACETAMINOPHEN 10 MG/ML
1000 INJECTION, SOLUTION INTRAVENOUS ONCE
Status: DISCONTINUED | OUTPATIENT
Start: 2025-02-05 | End: 2025-02-05

## 2025-02-05 RX ORDER — ROPIVACAINE HYDROCHLORIDE 2 MG/ML
INJECTION, SOLUTION EPIDURAL; INFILTRATION; PERINEURAL AS NEEDED
Status: DISCONTINUED | OUTPATIENT
Start: 2025-02-05 | End: 2025-02-05

## 2025-02-05 RX ORDER — BUPIVACAINE HYDROCHLORIDE 2.5 MG/ML
INJECTION, SOLUTION EPIDURAL; INFILTRATION; INTRACAUDAL AS NEEDED
Status: DISCONTINUED | OUTPATIENT
Start: 2025-02-05 | End: 2025-02-05 | Stop reason: HOSPADM

## 2025-02-05 RX ORDER — ALBUMIN HUMAN 50 G/1000ML
SOLUTION INTRAVENOUS CONTINUOUS PRN
Status: DISCONTINUED | OUTPATIENT
Start: 2025-02-05 | End: 2025-02-05

## 2025-02-05 RX ORDER — ONDANSETRON 2 MG/ML
4 INJECTION INTRAMUSCULAR; INTRAVENOUS EVERY 6 HOURS PRN
Status: DISCONTINUED | OUTPATIENT
Start: 2025-02-05 | End: 2025-02-06

## 2025-02-05 RX ORDER — ONDANSETRON 2 MG/ML
4 INJECTION INTRAMUSCULAR; INTRAVENOUS ONCE AS NEEDED
Status: DISCONTINUED | OUTPATIENT
Start: 2025-02-05 | End: 2025-02-05 | Stop reason: HOSPADM

## 2025-02-05 RX ORDER — ONDANSETRON 2 MG/ML
INJECTION INTRAMUSCULAR; INTRAVENOUS AS NEEDED
Status: DISCONTINUED | OUTPATIENT
Start: 2025-02-05 | End: 2025-02-05

## 2025-02-05 RX ORDER — SIMETHICONE 80 MG
80 TABLET,CHEWABLE ORAL EVERY 6 HOURS PRN
Status: DISCONTINUED | OUTPATIENT
Start: 2025-02-05 | End: 2025-02-10 | Stop reason: HOSPADM

## 2025-02-05 RX ORDER — PROPOFOL 10 MG/ML
INJECTION, EMULSION INTRAVENOUS AS NEEDED
Status: DISCONTINUED | OUTPATIENT
Start: 2025-02-05 | End: 2025-02-05

## 2025-02-05 RX ORDER — DOCUSATE SODIUM 100 MG/1
100 CAPSULE, LIQUID FILLED ORAL DAILY PRN
Status: DISCONTINUED | OUTPATIENT
Start: 2025-02-06 | End: 2025-02-10 | Stop reason: HOSPADM

## 2025-02-05 RX ORDER — LIDOCAINE HYDROCHLORIDE 10 MG/ML
INJECTION, SOLUTION EPIDURAL; INFILTRATION; INTRACAUDAL; PERINEURAL AS NEEDED
Status: DISCONTINUED | OUTPATIENT
Start: 2025-02-05 | End: 2025-02-05

## 2025-02-05 RX ORDER — DEXAMETHASONE SODIUM PHOSPHATE 10 MG/ML
INJECTION, SOLUTION INTRAMUSCULAR; INTRAVENOUS AS NEEDED
Status: DISCONTINUED | OUTPATIENT
Start: 2025-02-05 | End: 2025-02-05

## 2025-02-05 RX ORDER — HYDROMORPHONE HCL/PF 1 MG/ML
0.5 SYRINGE (ML) INJECTION
Status: DISCONTINUED | OUTPATIENT
Start: 2025-02-05 | End: 2025-02-05 | Stop reason: HOSPADM

## 2025-02-05 RX ORDER — SENNOSIDES 8.6 MG
1 TABLET ORAL DAILY PRN
Status: DISCONTINUED | OUTPATIENT
Start: 2025-02-06 | End: 2025-02-10 | Stop reason: HOSPADM

## 2025-02-05 RX ORDER — SODIUM CHLORIDE 9 MG/ML
100 INJECTION, SOLUTION INTRAVENOUS CONTINUOUS
Status: DISCONTINUED | OUTPATIENT
Start: 2025-02-05 | End: 2025-02-06

## 2025-02-05 RX ORDER — ACETAMINOPHEN 325 MG/1
975 TABLET ORAL EVERY 6 HOURS PRN
Status: DISCONTINUED | OUTPATIENT
Start: 2025-02-05 | End: 2025-02-06

## 2025-02-05 RX ADMIN — PHENYLEPHRINE HYDROCHLORIDE 30 MCG/MIN: 50 INJECTION INTRAVENOUS at 10:28

## 2025-02-05 RX ADMIN — ROCURONIUM 20 MG: 50 INJECTION, SOLUTION INTRAVENOUS at 12:40

## 2025-02-05 RX ADMIN — FENTANYL CITRATE 50 MCG: 50 INJECTION INTRAMUSCULAR; INTRAVENOUS at 09:30

## 2025-02-05 RX ADMIN — SODIUM CHLORIDE 125 ML/HR: 0.9 INJECTION, SOLUTION INTRAVENOUS at 08:41

## 2025-02-05 RX ADMIN — ROCURONIUM 40 MG: 50 INJECTION, SOLUTION INTRAVENOUS at 10:50

## 2025-02-05 RX ADMIN — ONDANSETRON 4 MG: 2 INJECTION INTRAMUSCULAR; INTRAVENOUS at 10:31

## 2025-02-05 RX ADMIN — SUGAMMADEX 200 MG: 100 INJECTION, SOLUTION INTRAVENOUS at 13:36

## 2025-02-05 RX ADMIN — ROPIVACAINE HYDROCHLORIDE 3 ML: 2 INJECTION EPIDURAL; INFILTRATION; PERINEURAL at 13:30

## 2025-02-05 RX ADMIN — MIDAZOLAM 1 MG: 1 INJECTION INTRAMUSCULAR; INTRAVENOUS at 10:03

## 2025-02-05 RX ADMIN — FENTANYL CITRATE 50 MCG: 50 INJECTION INTRAMUSCULAR; INTRAVENOUS at 10:08

## 2025-02-05 RX ADMIN — ALBUMIN (HUMAN): 12.5 INJECTION, SOLUTION INTRAVENOUS at 12:23

## 2025-02-05 RX ADMIN — ROCURONIUM 20 MG: 50 INJECTION, SOLUTION INTRAVENOUS at 11:35

## 2025-02-05 RX ADMIN — PROPOFOL 100 MCG/KG/MIN: 10 INJECTION, EMULSION INTRAVENOUS at 10:19

## 2025-02-05 RX ADMIN — LIDOCAINE HYDROCHLORIDE 3 ML: 10 INJECTION, SOLUTION EPIDURAL; INFILTRATION; INTRACAUDAL; PERINEURAL at 10:08

## 2025-02-05 RX ADMIN — METRONIDAZOLE: 500 INJECTION, SOLUTION INTRAVENOUS at 10:25

## 2025-02-05 RX ADMIN — ROPIVACAINE HYDROCHLORIDE 5 ML: 2 INJECTION EPIDURAL; INFILTRATION; PERINEURAL at 13:22

## 2025-02-05 RX ADMIN — SODIUM CHLORIDE: 0.9 INJECTION, SOLUTION INTRAVENOUS at 10:25

## 2025-02-05 RX ADMIN — ROPIVACAINE HYDROCHLORIDE: 2 INJECTION, SOLUTION EPIDURAL; INFILTRATION at 14:19

## 2025-02-05 RX ADMIN — PROPOFOL 200 MG: 10 INJECTION, EMULSION INTRAVENOUS at 10:09

## 2025-02-05 RX ADMIN — SUGAMMADEX 200 MG: 100 INJECTION, SOLUTION INTRAVENOUS at 13:56

## 2025-02-05 RX ADMIN — SODIUM CHLORIDE 100 ML/HR: 0.9 INJECTION, SOLUTION INTRAVENOUS at 22:21

## 2025-02-05 RX ADMIN — DEXAMETHASONE SODIUM PHOSPHATE 10 MG: 10 INJECTION INTRAMUSCULAR; INTRAVENOUS at 10:25

## 2025-02-05 RX ADMIN — MIDAZOLAM 1 MG: 1 INJECTION INTRAMUSCULAR; INTRAVENOUS at 09:30

## 2025-02-05 RX ADMIN — SODIUM CHLORIDE: 0.9 INJECTION, SOLUTION INTRAVENOUS at 12:47

## 2025-02-05 RX ADMIN — ROCURONIUM 60 MG: 50 INJECTION, SOLUTION INTRAVENOUS at 10:10

## 2025-02-05 RX ADMIN — ROPIVACAINE HYDROCHLORIDE 2 ML: 2 INJECTION EPIDURAL; INFILTRATION; PERINEURAL at 13:41

## 2025-02-05 RX ADMIN — FENTANYL CITRATE 50 MCG: 50 INJECTION INTRAMUSCULAR; INTRAVENOUS at 12:03

## 2025-02-05 RX ADMIN — CEFAZOLIN SODIUM 2000 MG: 2 SOLUTION INTRAVENOUS at 10:29

## 2025-02-05 RX ADMIN — HEPARIN SODIUM 5000 UNITS: 5000 INJECTION INTRAVENOUS; SUBCUTANEOUS at 11:10

## 2025-02-05 RX ADMIN — ROPIVACAINE HYDROCHLORIDE: 2 INJECTION, SOLUTION EPIDURAL; INFILTRATION at 22:14

## 2025-02-05 RX ADMIN — SODIUM CHLORIDE: 0.9 INJECTION, SOLUTION INTRAVENOUS at 11:24

## 2025-02-05 RX ADMIN — FENTANYL CITRATE 50 MCG: 50 INJECTION INTRAMUSCULAR; INTRAVENOUS at 12:08

## 2025-02-05 RX ADMIN — GLYCOPYRROLATE 0.1 MG: 0.2 INJECTION, SOLUTION INTRAMUSCULAR; INTRAVENOUS at 10:30

## 2025-02-05 RX ADMIN — SIMETHICONE 80 MG: 80 TABLET, CHEWABLE ORAL at 21:12

## 2025-02-05 NOTE — ANESTHESIA PROCEDURE NOTES
Epidural Block    Patient location during procedure: holding area  Start time: 2/5/2025 9:40 AM  Reason for block: procedure for pain  Staffing  Performed by: Db Sharif MD  Authorized by: Db Sharif MD    Preanesthetic Checklist  Completed: patient identified, IV checked, site marked, risks and benefits discussed, surgical consent, monitors and equipment checked, pre-op evaluation and timeout performed  Epidural  Patient position: sitting  Prep: ChloraPrep  Sedation Level: light sedation  Patient monitoring: frequent blood pressure checks, continuous pulse oximetry and heart rate  Approach: midline  Location: thoracic,  Injection technique: SHERRY saline and SHERRY air  Needle  Needle type: Tuohy   Needle gauge: 18 G  Catheter type: multi-orifice  Catheter size: 20 G  Catheter at skin depth: 12 cm  Catheter securement method: stabilization device, clear occlusive dressing, liquid medical adhesive and tape  Test dose: negative  Assessment  Number of attempts: 1negative aspiration for CSF, negative aspiration for heme and no paresthesia on injection  patient tolerated the procedure well with no immediate complications

## 2025-02-05 NOTE — ANESTHESIA PREPROCEDURE EVALUATION
Procedure:  LAPAROSCOPY DIAGNOSTIC, EUA (Uterus)  LAPAROTOMY EXPLORATORY (Abdomen)  BSO, TUMOR DEBULKING, PSB STAGING & TUMOR DEBULKING (Bilateral: Abdomen)    Relevant Problems   ANESTHESIA (within normal limits)      CARDIO   (+) Essential hypertension      ENDO (within normal limits)      GI/HEPATIC (within normal limits)      PULMONARY (within normal limits)      Obstetrics/Gynecology   (+) S/P laparoscopic supracervical hysterectomy (Resolved)      Blood   (+) Sickle cell trait (HCC)      Other   (+) Adnexal mass        Physical Exam    Airway    Mallampati score: I  TM Distance: >3 FB  Neck ROM: full     Dental   No notable dental hx     Cardiovascular  Cardiovascular exam normal    Pulmonary  Pulmonary exam normal     Other Findings        Anesthesia Plan  ASA Score- 2     Anesthesia Type- general with ASA Monitors.         Additional Monitors:     Airway Plan: ETT.    Comment: Surgeon requests epidural for postop pain control.       Plan Factors-    Chart reviewed.   Existing labs reviewed. Patient summary reviewed.                  Induction- intravenous.    Postoperative Plan-         Informed Consent- Anesthetic plan and risks discussed with patient.        NPO Status:  Vitals Value Taken Time   Date of last liquid 02/05/25 02/05/25 0804   Time of last liquid 0700 02/05/25 0804   Date of last solid 02/04/25 02/05/25 0804   Time of last solid 1500 02/05/25 0804

## 2025-02-05 NOTE — INTERVAL H&P NOTE
H&P reviewed. After examining the patient I find no changes in the patien'ts condition since the H&P was written.    Vitals:    02/05/25 0825   BP: 140/76   Pulse: 103   Resp: 18   Temp: (!) 97.3 °F (36.3 °C)   SpO2: 98%     Preop testing results noted. All questions answered.

## 2025-02-05 NOTE — ANESTHESIA POSTPROCEDURE EVALUATION
Post-Op Assessment Note    CV Status:  Stable    Pain management: adequate       Mental Status:  Alert and awake   Hydration Status:  Euvolemic   PONV Controlled:  Controlled   Airway Patency:  Patent     Post Op Vitals Reviewed: Yes    No anethesia notable event occurred.    Staff: Anesthesiologist           Last Filed PACU Vitals:  Vitals Value Taken Time   Temp 97.5 °F (36.4 °C) 02/05/25 1514   Pulse 104 02/05/25 1531   /78 02/05/25 1527   Resp 17 02/05/25 1514   SpO2 98 % 02/05/25 1531   Vitals shown include unfiled device data.    Modified Laura:     Vitals Value Taken Time   Activity 2 02/05/25 1514   Respiration 2 02/05/25 1514   Circulation 2 02/05/25 1514   Consciousness 2 02/05/25 1514   Oxygen Saturation 2 02/05/25 1514     Modified Laura Score: 10

## 2025-02-05 NOTE — INTERIM OP NOTE
LAPAROSCOPY DIAGNOSTIC, LAPAROSCOPIC HAND ASSISTED RIGHT SALPINGOOPHORECTOMY, APPENDECTOMY, OMENTECTOMY, MINI LAPAROTOMY, LEFT SALPINGOOPHORECTOMY, PERITONEAL BIOPSIES, BILATERAL PELVIC AND PARAAORTIC LYMPH NODE DISSECTION  Postoperative Note  PATIENT NAME: Esther Gonzalez  : 1970  MRN: [id number]  [facility]    Surgery Date: 2025    Preop Diagnosis:  Adnexal mass [N94.89]    Post-Op Diagnosis Codes:     * Adnexal mass [N94.89]    Procedure(s) (LRB):  LAPAROSCOPY DIAGNOSTIC, LAPAROSCOPIC HAND ASSISTED RIGHT SALPINGOOPHORECTOMY, APPENDECTOMY, OMENTECTOMY (N/A)  MINI LAPAROTOMY, LEFT SALPINGOOPHORECTOMY, PERITONEAL BIOPSIES, BILATERAL PELVIC AND PARAAORTIC LYMPH NODE DISSECTION (Bilateral)    Surgeons and Role:     * Eusebio Valderrama MD - Primary     * Breonna Maynard MD - Assisting     * Emeka Starr MD - Fellow    Specimens:  ID Type Source Tests Collected by Time Destination   1 :  Body Fluid Ascites NON-GYNECOLOGIC CYTOLOGY Eusebio Valderrama MD 2025 1109    2 : decompress in bag without rupture and spillage Tissue Ovary, Right TISSUE EXAM Eusebio Valderrama MD 2025 1131    3 : Omentum Tissue Omentum TISSUE EXAM Eusebio Valderrama MD 2025 1208    4 : Appendix Tissue Appendix TISSUE EXAM Eusebio Valderrama MD 2025 1218    5 : Right pelvic side wall lesion Tissue Pelvic TISSUE EXAM Eusebio Valderrama MD 2025 1239    6 : Right pelvic Peritoneum Tissue Pelvic TISSUE EXAM Eusebio Valderrama MD 2025 1239    7 : Right periaortic lymph nodes Tissue Pelvic TISSUE EXAM Eusebio Valderrama MD 2025 1242    8 : pelvic lymph nodes Tissue Pelvic TISSUE EXAM Eusebio Valderrama MD 2025 1245    9 : Left ovary Tissue Ovary, Left TISSUE EXAM Eusebio Valderrama MD 2025 1252    10 : Left periaortic lymph nodes Tissue Lymph Node TISSUE EXAM Eusebio Valderrama MD 2025 1307        Estimated Blood Loss:   150 mL    Anesthesia Type:   General w/ Epidural     Findings:    - Exam  under anesthesia with large approximately 20 cm mobile mass.  -Diagnostic laparoscopy with small amount of ascites, smooth walled approximately 20 cm right adnexal mass with adhesions to the right pelvic sidewall and omentum.  The intact right ovary mass was placed in a bag and ruptured in a contained fashion via the mini laparotomy /hand port site.  There was no tumor spillage. No evidence of extraovarian disease in the omentum, peritoneum, diaphragm surfaces.  Normal-appearing liver edge, appendix, and left ovary.  No evidence of enlarged or firm lymph nodes. Peritoneal inclusion cyst between the cervix and the sigmoid colon.  Moderate adhesions in the bilateral pelvic lymph node areas.  -At the conclusion of the case, there was no evidence of disease in the abdomen or pelvis.  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    Complications:   None      SIGNATURE: Emeka Starr MD   DATE: February 5, 2025   TIME: 2:44 PM

## 2025-02-05 NOTE — ANESTHESIA POSTPROCEDURE EVALUATION
Post-Op Assessment Note    CV Status:  Stable  Pain Score: 3    Pain management: adequate       Mental Status:  Alert and awake   Hydration Status:  Stable and euvolemic   PONV Controlled:  None   Airway Patency:  Patent and adequate     Post Op Vitals Reviewed: Yes    No anethesia notable event occurred.    Staff: CRNA           Last Filed PACU Vitals:  Vitals Value Taken Time   Temp 37    Pulse 104    /64    Resp 16    SpO2 98

## 2025-02-06 LAB
ALBUMIN SERPL BCG-MCNC: 3.6 G/DL (ref 3.5–5)
ALP SERPL-CCNC: 52 U/L (ref 34–104)
ALT SERPL W P-5'-P-CCNC: 8 U/L (ref 7–52)
ANION GAP SERPL CALCULATED.3IONS-SCNC: 9 MMOL/L (ref 4–13)
AST SERPL W P-5'-P-CCNC: 12 U/L (ref 13–39)
BILIRUB SERPL-MCNC: 0.43 MG/DL (ref 0.2–1)
BUN SERPL-MCNC: 5 MG/DL (ref 5–25)
CALCIUM SERPL-MCNC: 8.6 MG/DL (ref 8.4–10.2)
CHLORIDE SERPL-SCNC: 109 MMOL/L (ref 96–108)
CO2 SERPL-SCNC: 23 MMOL/L (ref 21–32)
CREAT SERPL-MCNC: 0.79 MG/DL (ref 0.6–1.3)
ERYTHROCYTE [DISTWIDTH] IN BLOOD BY AUTOMATED COUNT: 14.3 % (ref 11.6–15.1)
GFR SERPL CREATININE-BSD FRML MDRD: 84 ML/MIN/1.73SQ M
GLUCOSE SERPL-MCNC: 102 MG/DL (ref 65–140)
HCT VFR BLD AUTO: 39.9 % (ref 34.8–46.1)
HGB BLD-MCNC: 13.3 G/DL (ref 11.5–15.4)
MAGNESIUM SERPL-MCNC: 1.8 MG/DL (ref 1.9–2.7)
MCH RBC QN AUTO: 29.6 PG (ref 26.8–34.3)
MCHC RBC AUTO-ENTMCNC: 33.3 G/DL (ref 31.4–37.4)
MCV RBC AUTO: 89 FL (ref 82–98)
PLATELET # BLD AUTO: 421 THOUSANDS/UL (ref 149–390)
PMV BLD AUTO: 10 FL (ref 8.9–12.7)
POTASSIUM SERPL-SCNC: 3.6 MMOL/L (ref 3.5–5.3)
PROT SERPL-MCNC: 6.5 G/DL (ref 6.4–8.4)
RBC # BLD AUTO: 4.5 MILLION/UL (ref 3.81–5.12)
SODIUM SERPL-SCNC: 141 MMOL/L (ref 135–147)
WBC # BLD AUTO: 12.66 THOUSAND/UL (ref 4.31–10.16)

## 2025-02-06 PROCEDURE — 80053 COMPREHEN METABOLIC PANEL: CPT

## 2025-02-06 PROCEDURE — 85027 COMPLETE CBC AUTOMATED: CPT

## 2025-02-06 PROCEDURE — 83735 ASSAY OF MAGNESIUM: CPT

## 2025-02-06 PROCEDURE — 99024 POSTOP FOLLOW-UP VISIT: CPT | Performed by: OBSTETRICS & GYNECOLOGY

## 2025-02-06 RX ORDER — HEPARIN SODIUM 5000 [USP'U]/ML
5000 INJECTION, SOLUTION INTRAVENOUS; SUBCUTANEOUS 3 TIMES DAILY
Status: DISCONTINUED | OUTPATIENT
Start: 2025-02-06 | End: 2025-02-06

## 2025-02-06 RX ORDER — OXYCODONE HYDROCHLORIDE 10 MG/1
10 TABLET ORAL EVERY 4 HOURS PRN
Refills: 0 | Status: DISCONTINUED | OUTPATIENT
Start: 2025-02-06 | End: 2025-02-06

## 2025-02-06 RX ORDER — HEPARIN SODIUM 5000 [USP'U]/ML
5000 INJECTION, SOLUTION INTRAVENOUS; SUBCUTANEOUS EVERY 8 HOURS SCHEDULED
Status: DISCONTINUED | OUTPATIENT
Start: 2025-02-06 | End: 2025-02-10 | Stop reason: HOSPADM

## 2025-02-06 RX ORDER — OXYCODONE HYDROCHLORIDE 5 MG/1
5 TABLET ORAL EVERY 6 HOURS PRN
Qty: 6 TABLET | Refills: 0 | Status: SHIPPED | OUTPATIENT
Start: 2025-02-06 | End: 2025-02-16

## 2025-02-06 RX ORDER — MAGNESIUM SULFATE HEPTAHYDRATE 40 MG/ML
2 INJECTION, SOLUTION INTRAVENOUS ONCE
Status: COMPLETED | OUTPATIENT
Start: 2025-02-06 | End: 2025-02-06

## 2025-02-06 RX ORDER — POTASSIUM CHLORIDE 1500 MG/1
40 TABLET, EXTENDED RELEASE ORAL ONCE
Status: COMPLETED | OUTPATIENT
Start: 2025-02-06 | End: 2025-02-06

## 2025-02-06 RX ORDER — ONDANSETRON 2 MG/ML
4 INJECTION INTRAMUSCULAR; INTRAVENOUS EVERY 6 HOURS
Status: DISCONTINUED | OUTPATIENT
Start: 2025-02-06 | End: 2025-02-10 | Stop reason: HOSPADM

## 2025-02-06 RX ORDER — OXYCODONE HYDROCHLORIDE 5 MG/1
5 TABLET ORAL EVERY 4 HOURS PRN
Refills: 0 | Status: DISCONTINUED | OUTPATIENT
Start: 2025-02-06 | End: 2025-02-10 | Stop reason: HOSPADM

## 2025-02-06 RX ORDER — ACETAMINOPHEN 325 MG/1
975 TABLET ORAL EVERY 6 HOURS SCHEDULED
Status: DISCONTINUED | OUTPATIENT
Start: 2025-02-06 | End: 2025-02-10 | Stop reason: HOSPADM

## 2025-02-06 RX ADMIN — ONDANSETRON 4 MG: 2 INJECTION INTRAMUSCULAR; INTRAVENOUS at 20:41

## 2025-02-06 RX ADMIN — POTASSIUM CHLORIDE 40 MEQ: 1500 TABLET, EXTENDED RELEASE ORAL at 10:42

## 2025-02-06 RX ADMIN — MAGNESIUM SULFATE HEPTAHYDRATE 2 G: 40 INJECTION, SOLUTION INTRAVENOUS at 10:41

## 2025-02-06 RX ADMIN — HEPARIN SODIUM 5000 UNITS: 5000 INJECTION INTRAVENOUS; SUBCUTANEOUS at 17:51

## 2025-02-06 RX ADMIN — ACETAMINOPHEN 975 MG: 325 TABLET, FILM COATED ORAL at 08:41

## 2025-02-06 RX ADMIN — ONDANSETRON 4 MG: 2 INJECTION INTRAMUSCULAR; INTRAVENOUS at 14:52

## 2025-02-06 RX ADMIN — SIMETHICONE 80 MG: 80 TABLET, CHEWABLE ORAL at 08:41

## 2025-02-06 RX ADMIN — ACETAMINOPHEN 975 MG: 325 TABLET, FILM COATED ORAL at 23:44

## 2025-02-06 RX ADMIN — ONDANSETRON 4 MG: 2 INJECTION INTRAMUSCULAR; INTRAVENOUS at 23:46

## 2025-02-06 RX ADMIN — ACETAMINOPHEN 975 MG: 325 TABLET, FILM COATED ORAL at 20:41

## 2025-02-06 RX ADMIN — SODIUM CHLORIDE 100 ML/HR: 0.9 INJECTION, SOLUTION INTRAVENOUS at 06:37

## 2025-02-06 RX ADMIN — ACETAMINOPHEN 975 MG: 325 TABLET, FILM COATED ORAL at 14:04

## 2025-02-06 RX ADMIN — ONDANSETRON 4 MG: 2 INJECTION INTRAMUSCULAR; INTRAVENOUS at 08:55

## 2025-02-06 NOTE — ASSESSMENT & PLAN NOTE
See plan above under diagnostic laparoscopy, bilateral salpingo-oophorectomy, resection of ovarian mass and minilaparotomy

## 2025-02-06 NOTE — PLAN OF CARE
Problem: PAIN - ADULT  Goal: Verbalizes/displays adequate comfort level or baseline comfort level  Description: Interventions:  - Encourage patient to monitor pain and request assistance  - Assess pain using appropriate pain scale  - Administer analgesics based on type and severity of pain and evaluate response  - Implement non-pharmacological measures as appropriate and evaluate response  - Consider cultural and social influences on pain and pain management  - Notify physician/advanced practitioner if interventions unsuccessful or patient reports new pain  Outcome: Progressing     Problem: INFECTION - ADULT  Goal: Absence or prevention of progression during hospitalization  Description: INTERVENTIONS:  - Assess and monitor for signs and symptoms of infection  - Monitor lab/diagnostic results  - Monitor all insertion sites, i.e. indwelling lines, tubes, and drains  - Monitor endotracheal if appropriate and nasal secretions for changes in amount and color  - Parish appropriate cooling/warming therapies per order  - Administer medications as ordered  - Instruct and encourage patient and family to use good hand hygiene technique  - Identify and instruct in appropriate isolation precautions for identified infection/condition  Outcome: Progressing  Goal: Absence of fever/infection during neutropenic period  Description: INTERVENTIONS:  - Monitor WBC    Outcome: Progressing     Problem: SAFETY ADULT  Goal: Patient will remain free of falls  Description: INTERVENTIONS:  - Educate patient/family on patient safety including physical limitations  - Instruct patient to call for assistance with activity   - Consult OT/PT to assist with strengthening/mobility   - Keep Call bell within reach  - Keep bed low and locked with side rails adjusted as appropriate  - Keep care items and personal belongings within reach  - Initiate and maintain comfort rounds  - Make Fall Risk Sign visible to staff  - Offer Toileting every 2 Hours,  in advance of need  - Initiate/Maintain bed alarm  - Obtain necessary fall risk management equipment:   - Apply yellow socks and bracelet for high fall risk patients  - Consider moving patient to room near nurses station  Outcome: Progressing  Goal: Maintain or return to baseline ADL function  Description: INTERVENTIONS:  -  Assess patient's ability to carry out ADLs; assess patient's baseline for ADL function and identify physical deficits which impact ability to perform ADLs (bathing, care of mouth/teeth, toileting, grooming, dressing, etc.)  - Assess/evaluate cause of self-care deficits   - Assess range of motion  - Assess patient's mobility; develop plan if impaired  - Assess patient's need for assistive devices and provide as appropriate  - Encourage maximum independence but intervene and supervise when necessary  - Involve family in performance of ADLs  - Assess for home care needs following discharge   - Consider OT consult to assist with ADL evaluation and planning for discharge  - Provide patient education as appropriate  Outcome: Progressing  Goal: Maintains/Returns to pre admission functional level  Description: INTERVENTIONS:  - Perform AM-PAC 6 Click Basic Mobility/ Daily Activity assessment daily.  - Set and communicate daily mobility goal to care team and patient/family/caregiver.   - Out of bed for toileting  - Record patient progress and toleration of activity level   Outcome: Progressing     Problem: DISCHARGE PLANNING  Goal: Discharge to home or other facility with appropriate resources  Description: INTERVENTIONS:  - Identify barriers to discharge w/patient and caregiver  - Arrange for needed discharge resources and transportation as appropriate  - Identify discharge learning needs (meds, wound care, etc.)  - Arrange for interpretive services to assist at discharge as needed  - Refer to Case Management Department for coordinating discharge planning if the patient needs post-hospital services  based on physician/advanced practitioner order or complex needs related to functional status, cognitive ability, or social support system  Outcome: Progressing     Problem: Knowledge Deficit  Goal: Patient/family/caregiver demonstrates understanding of disease process, treatment plan, medications, and discharge instructions  Description: Complete learning assessment and assess knowledge base.  Interventions:  - Provide teaching at level of understanding  - Provide teaching via preferred learning methods  Outcome: Progressing

## 2025-02-06 NOTE — PLAN OF CARE
Problem: PAIN - ADULT  Goal: Verbalizes/displays adequate comfort level or baseline comfort level  Description: Interventions:  - Encourage patient to monitor pain and request assistance  - Assess pain using appropriate pain scale  - Administer analgesics based on type and severity of pain and evaluate response  - Implement non-pharmacological measures as appropriate and evaluate response  - Consider cultural and social influences on pain and pain management  - Notify physician/advanced practitioner if interventions unsuccessful or patient reports new pain  Outcome: Progressing     Problem: INFECTION - ADULT  Goal: Absence or prevention of progression during hospitalization  Description: INTERVENTIONS:  - Assess and monitor for signs and symptoms of infection  - Monitor lab/diagnostic results  - Monitor all insertion sites, i.e. indwelling lines, tubes, and drains  - Monitor endotracheal if appropriate and nasal secretions for changes in amount and color  - [place] appropriate cooling/warming therapies per order  - Administer medications as ordered  - Instruct and encourage patient and family to use good hand hygiene technique  - Identify and instruct in appropriate isolation precautions for identified infection/condition  Outcome: Progressing  Goal: Absence of fever/infection during neutropenic period  Description: INTERVENTIONS:  - Monitor WBC    Outcome: Progressing     Problem: SAFETY ADULT  Goal: Patient will remain free of falls  Description: INTERVENTIONS:  - Educate patient/family on patient safety including physical limitations  - Instruct patient to call for assistance with activity   - Consult OT/PT to assist with strengthening/mobility   - Keep Call bell within reach  - Keep bed low and locked with side rails adjusted as appropriate  - Keep care items and personal belongings within reach  - Initiate and maintain comfort rounds  - Make Fall Risk Sign visible to staff  - Offer Toileting every 2 Hours,  in advance of need  - Initiate/Maintain bed alarm  - Obtain necessary fall risk management equipment:   - Apply yellow socks and bracelet for high fall risk patients  - Consider moving patient to room near nurses station  Outcome: Progressing  Goal: Maintain or return to baseline ADL function  Description: INTERVENTIONS:  -  Assess patient's ability to carry out ADLs; assess patient's baseline for ADL function and identify physical deficits which impact ability to perform ADLs (bathing, care of mouth/teeth, toileting, grooming, dressing, etc.)  - Assess/evaluate cause of self-care deficits   - Assess range of motion  - Assess patient's mobility; develop plan if impaired  - Assess patient's need for assistive devices and provide as appropriate  - Encourage maximum independence but intervene and supervise when necessary  - Involve family in performance of ADLs  - Assess for home care needs following discharge   - Consider OT consult to assist with ADL evaluation and planning for discharge  - Provide patient education as appropriate  Outcome: Progressing  Goal: Maintains/Returns to pre admission functional level  Description: INTERVENTIONS:  - Perform AM-PAC 6 Click Basic Mobility/ Daily Activity assessment daily.  - Set and communicate daily mobility goal to care team and patient/family/caregiver.   - Out of bed for toileting  - Record patient progress and toleration of activity level   Outcome: Progressing     Problem: DISCHARGE PLANNING  Goal: Discharge to home or other facility with appropriate resources  Description: INTERVENTIONS:  - Identify barriers to discharge w/patient and caregiver  - Arrange for needed discharge resources and transportation as appropriate  - Identify discharge learning needs (meds, wound care, etc.)  - Arrange for interpretive services to assist at discharge as needed  - Refer to Case Management Department for coordinating discharge planning if the patient needs post-hospital services  based on physician/advanced practitioner order or complex needs related to functional status, cognitive ability, or social support system  Outcome: Progressing     Problem: Knowledge Deficit  Goal: Patient/family/caregiver demonstrates understanding of disease process, treatment plan, medications, and discharge instructions  Description: Complete learning assessment and assess knowledge base.  Interventions:  - Provide teaching at level of understanding  - Provide teaching via preferred learning methods  Outcome: Progressing

## 2025-02-06 NOTE — ASSESSMENT & PLAN NOTE
- Originally presented on 1/22 to [facility] ED for worsening intermittent abdominal pain with associated increasing abdominal girth for a year  - 1/22/2025 CT AP: 17 cm large cystic and solid right adnexal mass, consistent with a primary ovarian serous cystadenocarcinoma. 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

## 2025-02-06 NOTE — ASSESSMENT & PLAN NOTE
- Originally presented to [facility] for worsening intermittent abdominal pain with associated abdominal girth for a year  - 1/22/2025 CT AP: 17 cm large cystic and solid right adnexal mass, consistent with a primary ovarian serous cystadenocarcinoma. 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

## 2025-02-06 NOTE — CASE MANAGEMENT
Case Management Progress Note    Patient name Esther Gonzalez  [facility] /E5 -* MRN [id number]  : 1970 Date 2025       LOS (days): 1  Geometric Mean LOS (GMLOS) (days): 3.7  Days to GMLOS:2.6        OBJECTIVE:     Current admission status: Inpatient  Preferred Pharmacy:   [id number] [address] [facility]  [address] [place] [id number]  Phone: [phone] Fax: [phone]    Primary Care Provider: Sangeeta Leos DO    Primary Insurance: BLUE CROSS  Secondary Insurance:     PROGRESS NOTE:    CM spoke with [facility] and pt's [employer] copay is $290. They will apply 30 day free trial card. Pt has commercial insurance so cm also provided her a $10 copay card. Pt's family will transport home. No other needs.

## 2025-02-06 NOTE — RESTORATIVE TECHNICIAN NOTE
Restorative Technician Note      Patient Name: Esther Gonzaelz     Restorative Tech Visit Date: 02/06/25  Note Type: Mobility  Patient Position Upon Consult: Supine  Activity Performed: Ambulated  Assistive Device: Roller walker  Patient Position at End of Consult: Bedside chair; All needs within reach        ns

## 2025-02-06 NOTE — ASSESSMENT & PLAN NOTE
Home meds: lisinopril 40mg qD, and amlodipine 5 mg qD    Plan:   - Consider reintroducing one medication at a time for improved BP control

## 2025-02-06 NOTE — UTILIZATION REVIEW
Initial Clinical Review    Elective   IP    surgical procedure    Age/Sex: 54 y.o. female    Surgery Date:   2/5/25    Procedure: DIAGNOSTIC LAPAROSCOPY, LAPAROSCOPIC HAND ASSISTED RIGHT SALPINGO-OOPHORECTOMY, INDICATED APPENDECTOMY, INFRACOLIC OMENTECTOMY,  MINI LAPAROTOMY WITH ADHESIOLYSIS, LEFT OOPHORECTOMY, STAGING PERITONEAL BIOPSIES AND BILATERAL PELVIC AND PARAAORTIC LYMPHADENECTOMY    Anesthesia:  general w/epidural    Operative Findings: Exam under anesthesia with approximately 20 cm mobile mass.  2. Diagnostic laparoscopy with small amount of ascites, smooth walled approximately 20 cm right adnexal mass with filmy adhesions to the right pelvic sidewall and omentum.  Frozen section demonstrated poorly differentiated carcinoma with extensive areas of gross apparent necrosis.  No evidence of gross surface involvement.  The intact right ovary mass was placed in a bag and ruptured in a contained fashion via the mini laparotomy / hand port site.  There was no tumor spillage. No evidence of extraovarian disease in the bowels, omentum, peritoneum, diaphragm surfaces.  Normal-appearing liver edge, appendix, and left ovary.  No evidence of enlarged or firm lymph nodes. Peritoneal inclusion cyst between the cervix and the sigmoid colon.  Moderate adhesions in the bilateral pelvic lymph node areas.  3. Surgically absent uterine corpus consistent with prior history of supracervical hysterectomy.  4. Grossly normal cervix not readily approachable through the abdomen / laparotomy given significant pelvic adhesions.  5. At the conclusion of the case, there was no evidence of disease in the abdomen or pelvis.    POD#1 Progress Note:   2/6   Continue post op care.  Continue   IVF.   Monitor labs.  Hemoglobin  13.3  this am. Epidural  removed this  am. Pain  controlled.   - flatus  - BM     No po intake  thus far  other than jello.  O2  sats  97  % RA.  Ware  intact, possible  d/c  today.      Admission Orders:  Date/Time/Statement:   Admission Orders (From admission, onward)       Ordered        02/05/25 1414  Inpatient Admission  Once                          Orders Placed This Encounter   Procedures    Inpatient Admission     Standing Status:   Standing     Number of Occurrences:   1     Level of Care:   Med Surg [16]     Estimated length of stay:   More than 2 Midnights     Certification:   I certify that inpatient services are medically necessary for this patient for a duration of greater than two midnights. See H&P and MD Progress Notes for additional information about the patient's course of treatment.     Diet:  regular    Mobility:   OOB to chair  TID    DVT Prophylaxis:   SCD's    Medications/Pain Control:   Scheduled Medications:  acetaminophen, 975 mg, Oral, Q6H ANNMARIE  magnesium sulfate, 2 g, Intravenous, Once      Continuous IV Infusions:     PRN Meds:  docusate sodium, 100 mg, Oral, Daily PRN  ondansetron, 4 mg, Intravenous, Q6H PRN  oxyCODONE, 10 mg, Oral, Q4H PRN  oxyCODONE, 5 mg, Oral, Q4H PRN  senna, 1 tablet, Oral, Daily PRN  simethicone, 80 mg, Oral, Q6H PRN      Vital Signs (last 3 days)       Date/Time Temp Pulse Resp BP MAP (mmHg) SpO2 Calculated FIO2 (%) - Nasal Cannula Nasal Cannula O2 Flow Rate (L/min) O2 Device Cardiac (WDL) Patient Position - Orthostatic VS Pain    02/06/25 0841 -- -- -- -- -- -- -- -- -- -- -- 6    02/06/25 07:51:27 -- 100 22 165/95 118 97 % -- -- -- -- -- --    02/06/25 0640 97.8 °F (36.6 °C) 100 16 158/95 116 -- -- -- None (Room air) -- Lying No Pain    02/06/25 06:37:35 -- 95 -- 158/95 116 95 % -- -- -- -- -- --    02/06/25 03:25:22 97.8 °F (36.6 °C) 94 16 135/71 92 98 % -- -- None (Room air) -- -- --    02/05/25 2214 -- -- -- -- -- -- -- -- -- -- -- 5    02/05/25 22:12:22 97.5 °F (36.4 °C) 95 16 157/96 116 96 % -- -- None (Room air) -- Lying 5 02/05/25 19:28:26 98 °F (36.7 °C) 109 18 162/91 115 98 % -- -- None (Room air) -- Lying 4 02/05/25 1927 -- -- -- -- -- -- -- --  -- -- -- 4    02/05/25 18:04:08 -- 108 18 160/103 122 96 % -- -- None (Room air) -- Lying --    02/05/25 15:50:48 98.2 °F (36.8 °C) 110 17 153/100 118 96 % -- -- None (Room air) -- Lying --    02/05/25 1549 -- -- -- -- -- -- -- -- -- -- -- 6 02/05/25 1514 97.5 °F (36.4 °C) 103 17 145/73 101 98 % -- -- None (Room air) -- -- 8    02/05/25 1458 -- 100 18 138/72 99 97 % -- -- None (Room air) -- -- --    02/05/25 1449 -- 100 17 132/66 -- 97 % -- -- None (Room air) -- -- --    02/05/25 1442 -- 100 18 132/66 -- 96 % -- -- None (Room air) -- -- 7    02/05/25 1427 -- 100 17 130/60 87 97 % -- -- None (Room air) -- -- 5    02/05/25 1419 97.2 °F (36.2 °C) 100 16 136/63 91 97 % -- -- None (Room air) -- Lying 6    02/05/25 1405 98.4 °F (36.9 °C) 105 17 149/64 -- 97 % -- -- None (Room air) X -- --    02/05/25 0959 -- 106 -- 147/77 -- 100 % 36 4 L/min Nasal cannula -- -- --    02/05/25 0954 -- 102 -- 144/86 -- 100 % 36 4 L/min Nasal cannula -- -- --    02/05/25 0949 -- 104 -- 136/80 -- 99 % 36 4 L/min Nasal cannula -- -- --    02/05/25 0944 -- 102 -- 128/71 -- 100 % 36 4 L/min Nasal cannula -- -- --    02/05/25 0939 -- 106 -- 136/81 -- 100 % 36 4 L/min Nasal cannula -- -- --    02/05/25 0934 -- 100 -- 135/69 -- 100 % 36 4 L/min Nasal cannula -- -- --    02/05/25 0924 -- 104 -- 139/71 -- 99 % 36 4 L/min Nasal cannula -- -- --    02/05/25 0922 -- 110 -- -- -- 100 % 36 4 L/min Nasal cannula -- -- --    02/05/25 0919 -- -- -- 167/88 -- -- -- -- -- -- -- --    02/05/25 0908 -- -- -- -- -- -- -- -- -- -- -- No Pain    02/05/25 0825 97.3 °F (36.3 °C) 103 18 140/76 -- 98 % -- -- None (Room air) -- -- No Pain          Weight (last 2 days)       Date/Time Weight    02/05/25 15:50:48 105 (230.6)    02/05/25 0825 92.6 (204.15)            Pertinent Labs/Diagnostic Test Results:   Radiology:      Results from last 7 days   Lab Units 02/06/25  0636   WBC Thousand/uL 12.66*   HEMOGLOBIN g/dL 13.3   HEMATOCRIT % 39.9   PLATELETS Thousands/uL  421*         Results from last 7 days   Lab Units 02/06/25  0636   SODIUM mmol/L 141   POTASSIUM mmol/L 3.6   CHLORIDE mmol/L 109*   CO2 mmol/L 23   ANION GAP mmol/L 9   BUN mg/dL 5   CREATININE mg/dL 0.79   EGFR ml/min/1.73sq m 84   CALCIUM mg/dL 8.6   MAGNESIUM mg/dL 1.8*     Results from last 7 days   Lab Units 02/06/25  0636   AST U/L 12*   ALT U/L 8   ALK PHOS U/L 52   TOTAL PROTEIN g/dL 6.5   ALBUMIN g/dL 3.6   TOTAL BILIRUBIN mg/dL 0.43         Results from last 7 days   Lab Units 02/06/25  0636   GLUCOSE RANDOM mg/dL 102                             Network Utilization Review Department  ATTENTION: Please call with any questions or concerns to [phone] and carefully listen to the prompts so that you are directed to the right person. All voicemails are confidential.   For Discharge needs, contact Care Management DC Support Team at [phone] opt. 2  Send all requests for admission clinical reviews, approved or denied determinations and any other requests to dedicated fax number below belonging to the campus where the patient is receiving treatment. List of dedicated fax numbers for the Facilities:  FACILITY NAME UR FAX NUMBER   ADMISSION DENIALS (Administrative/Medical Necessity) [phone]   DISCHARGE SUPPORT TEAM (NETWORK) [phone]   PARENT CHILD HEALTH (Maternity/NICU/Pediatrics) [phone]   General acute hospital [phone]   [facility] [phone]   [facility] [phone]   [facility] [phone]   [facility] [phone]   [facility] [phone]   [facility] [phone]   [facility] [phone]   [facility] [phone]   [facility] [phone]   [facility] [phone]   [facility] [phone]

## 2025-02-06 NOTE — PROGRESS NOTES
Progress Note - GYN Oncology   Name: Esther Gonzalez 54 y.o. female I MRN: [id number]  Unit/Bed#: E5 -01 I Date of Admission: 2/5/2025   Date of Service: 2/6/2025 I Hospital Day: 2     Assessment & Plan  Diagnostic  laparoscopy, Bilateral salpingo-oopherectomy, resection of ovarian mass and minilaparotomy  - Originally presented to [facility] ED for worsening intermittent abdominal pain with associated abdominal girth for a year  - 1/22/2025 CT AP: 17 cm large cystic and solid right adnexal mass, consistent with a primary ovarian serous cystadenocarcinoma. 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     Plan:   - Pain: chasity tylenol, epidural removed by anesthesia today  - FEN: Regular,  ml/hr  - : Silva, consider removal of silva for void trial; UOP adequate overnight with 2.3 ml/kg/hr,  - DVT ppx: SCD's, start heparin 6 hours after epidural removal   - Daily AM labs    Essential hypertension  Home meds: lisinopril 40mg qD, and amlodipine 5 mg qD  Held post-op  Sickle cell trait (HCC)  Monitor hemoglobin  Daily labs  Adnexal mass  See plan above under diagnostic laparoscopy, bilateral salpingo-oophorectomy, resection of ovarian mass and minilaparotomy     24 Hour Events : no acute events   Subjective : POD#1. Patient is doing well. She reports that her pain is well controlled. She has not passed flatus or passed a bowel movement yet. She has not tried eating yet because she does not have an appetite, but has able to tolerate a protein drink and jello. Denies nausea and vomiting. Denies having chest pain or shortness of breath.     Objective :  Temp:  [97.2 °F (36.2 °C)-98.4 °F (36.9 °C)] 97.8 °F (36.6  °C)  HR:  [] 100  BP: (128-167)/() 158/95  Resp:  [16-18] 16  SpO2:  [95 %-100 %] 95 %  O2 Device: None (Room air)  Nasal Cannula O2 Flow Rate (L/min):  [4 L/min] 4 L/min    Physical Exam  Vitals and nursing note reviewed.   Constitutional:       General: She is not in acute distress.     Appearance: Normal appearance.   HENT:      Head: Normocephalic and atraumatic.   Eyes:      Extraocular Movements: Extraocular movements intact.   Cardiovascular:      Rate and Rhythm: Normal rate and regular rhythm.      Pulses: Normal pulses.      Heart sounds: Normal heart sounds.   Pulmonary:      Effort: Pulmonary effort is normal. No respiratory distress.      Breath sounds: Normal breath sounds.   Abdominal:      General: Abdomen is flat. Bowel sounds are normal. There is no distension.      Palpations: Abdomen is soft.      Tenderness: There is no abdominal tenderness.      Comments: Port sites and mini lap sites C/D/I with dried exofin glue   Genitourinary:     Comments: Ware in place  Musculoskeletal:         General: Normal range of motion.      Cervical back: Normal range of motion.   Skin:     General: Skin is warm and dry.   Neurological:      General: No focal deficit present.      Mental Status: She is alert. Mental status is at baseline.   Psychiatric:         Mood and Affect: Mood normal.         Behavior: Behavior normal.         Lab Results: I have reviewed the following results:CBC/BMP: No new results in last 24 hours.     Imaging Results Review: No pertinent imaging studies reviewed.  Other Study Results Review: No additional pertinent studies reviewed.    VTE Pharmacologic Prophylaxis: VTE covered by:    None     VTE Mechanical Prophylaxis: sequential compression device    Juan A Phillips MD  Obstetrics & Gynecology PGY-1  2/6/2025  6:56 AM

## 2025-02-06 NOTE — OP NOTE
OPERATIVE REPORT  PATIENT NAME: Esther Gonzalez    :  1970  MRN: [id number]  Pt Location: [facility] OR ROOM 05    SURGERY DATE: 2025    Surgeons and Role:     * Eusebio Valderrama MD - Primary     * Breonna Maynard MD - Assisting     * Emeka Starr MD - Fellow    Preop Diagnosis:  Adnexal mass [N94.89]    Post-Op Diagnosis Codes:     * Adnexal mass [N94.89]    Procedure(s):  DIAGNOSTIC LAPAROSCOPY, LAPAROSCOPIC HAND ASSISTED RIGHT SALPINGO-OOPHORECTOMY, INDICATED APPENDECTOMY, INFRACOLIC OMENTECTOMY,  MINI LAPAROTOMY WITH ADHESIOLYSIS, LEFT OOPHORECTOMY, STAGING PERITONEAL BIOPSIES AND BILATERAL PELVIC AND PARAAORTIC LYMPHADENECTOMY    Specimen(s):  ID Type Source Tests Collected by Time Destination   1 :  Body Fluid Ascites NON-GYNECOLOGIC CYTOLOGY Eusebio Valderrama MD 2025 1109    2 : decompress in bag without rupture and spillage Tissue Ovary, Right TISSUE EXAM Eusebio Valderrama MD 2025 1131    3 : Omentum Tissue Omentum TISSUE EXAM Eusebio Valderarma MD 2025 1208    4 : Appendix Tissue Appendix TISSUE EXAM Eusebio Valderrama MD 2025 1218    5 : Right pelvic side wall lesion Tissue Pelvic TISSUE EXAM Eusebio Valderrama MD 2025 1239    6 : Right pelvic Peritoneum Tissue Pelvic TISSUE EXAM Eusebio Valderrama MD 2025 1239    7 : Right periaortic lymph nodes Tissue Pelvic TISSUE EXAM Eusebio Valderrama MD 2025 1242    8 : pelvic lymph nodes Tissue Pelvic TISSUE EXAM Eusebio Valderrama MD 2025 1245    9 : Left ovary Tissue Ovary, Left TISSUE EXAM Eusebio Valderrama MD 2025 1252    10 : Left periaortic lymph nodes Tissue Lymph Node TISSUE EXAM Eusebio Valderrama MD 2025 1307        Estimated Blood Loss:   150 mL    Drains:  Urethral Catheter Non-latex 16 Fr. (Active)   Reasons to continue Urinary Catheter  Post-operative urological requirements 25 1605   Goal for Removal Voiding trial when ambulation improves 25 1605   Site Assessment Clean;Skin intact  02/05/25 1605   Collection Container Standard drainage bag 02/05/25 1605   Securement Method Securing device (Describe) 02/05/25 1605   Output (mL) 1150 mL 02/05/25 1901   Number of days: 0       Anesthesia Type:   General w/ Epidural    Operative Indications:  54-year-old with approximately 18 cm complex pelvic mass, elevated tumor markers.  After counseling, she consented to proceed with definitive surgical intervention.  Of note, patient has prior history of supracervical hysterectomy for benign, unrelated indications.    Operative Findings:  Exam under anesthesia with approximately 20 cm mobile mass.  2. Diagnostic laparoscopy with small amount of ascites, smooth walled approximately 20 cm right adnexal mass with filmy adhesions to the right pelvic sidewall and omentum.  Frozen section demonstrated poorly differentiated carcinoma with extensive areas of gross apparent necrosis.  No evidence of gross surface involvement.  The intact right ovary mass was placed in a bag and ruptured in a contained fashion via the mini laparotomy / hand port site.  There was no tumor spillage. No evidence of extraovarian disease in the bowels, omentum, peritoneum, diaphragm surfaces.  Normal-appearing liver edge, appendix, and left ovary.  No evidence of enlarged or firm lymph nodes. Peritoneal inclusion cyst between the cervix and the sigmoid colon.  Moderate adhesions in the bilateral pelvic lymph node areas.  3. Surgically absent uterine corpus consistent with prior history of supracervical hysterectomy.  4. Grossly normal cervix not readily approachable through the abdomen / laparotomy given significant pelvic adhesions.  5. At the conclusion of the case, there was no evidence of disease in the abdomen or pelvis.     Complications:  None    Procedure and Technique:  The patient was identified in the preoperative holding area.  Epidural catheter was placed for perioperative analgesia.  The patient was taken to the operating room  where her general endotracheal anesthesia was induced without complications.  The patient was placed in the dorsolithotomy position on Octaviano stirrups.  She received Ancef and Flagyl for perioperative prophylaxis per protocol.  Sequential compression devices were applied to lower extremities and activated prior to induction of anesthesia.  5000 units of subcutaneous heparin were administered approximately 1 hour after epidural catheter placement.      The patient's abdomen, perineum and vagina were prepped and draped in the usual sterile fashion. A 16 [place] Ware catheter was inserted in the bladder.  Examiner esthesia was performed and the above-mentioned findings were noted.  The sponge stick was placed in the vagina.    A 5 mm incision was made in the left upper quadrant and the peritoneum entered sharply under direct visualization without difficulty using the 5 mm laparoscope.  Pneumoperitoneum was allowed to maximal pressure of 15 mmHg.  Additional 5 mm trocars were placed in the epigastrium near the midline in the right upper quadrant.  Above-mentioned findings were noted.  We decided to proceed with hand-assisted laparoscopic resection.  An approximately 8 to 10 cm vertical suprapubic incision was made with electrocautery and the peritoneal cavity was entered sharply without difficulty.  The HandPort was secured without difficulty.    Thin adhesions from the omentum to the adnexal mass were broken loose using hand dissection.  Then, thicker adhesions from the mass to the right pelvic sidewall were divided sharply with the LigaSure device.  Then, the ovarian vessels of the right side were skeletonized cauterized and divided with the LigaSure device.  An intestinal containment bag was introduced into the abdomen and the mass was completely placed within the bag.  The HandPort lid was removed and the edges of the back delivered through the port.  The mass was decompressed within this back without spillage of any  contents.  The mass was sent for frozen section with the above-mentioned findings noted.  At this point, we proceeded with infracolic omentectomy which was performed with laparoscopic hand-assisted technique.  The omentum was delivered through the HandPort.  Peritoneal biopsies from the right and left pelvic sidewalls were obtained.  Then, the appendix was visualized and removed.  The mesentery of the appendix was divided with the vessel sealer device.  The base of the appendix was doubly ligated with PDS Endoloops and a third Vicryl Endoloop placed distally.  The appendix was divided and delivered through the HandPort.  There was no spillage of colonic contents.    The bowel was run from the terminal ileum to the limit of Treitz.  All peritoneal surfaces were visualized and or palpated as described and there was no evidence of extraovarian disease.  At this point pneumoperitoneum was completely released, all trocars were removed.  We proceeded with bilateral pelvic and periodic lymphadenectomy via HandPort.  Hand-held retractors were used to maximize exposure.  The peritoneum lateral to the ovarian vessels was divided and the pararectal and paravesical spaces evaluated bilaterally.  Significant fibrosis was noted.  Scant identifiable krishan tissue on both sides was excised using a combination of sharp and blunt dissection.  The boundaries of the dissection were given by the genitofemoral nerves laterally, the deep circumflex iliac veins distally, superior vesicle artery medially and the obturator nerve inferiorly.  Lymph nodes were sent for permanent.  Then, the avascular line of Toldt was divided on both sides and the colon in either side retracted medially.  Lymph node bearing tissue overlying the lower periaortic region to the level of the KEREN was excised using sharp and blunt dissection both sides.  Rare bleeders were controlled with the vessel sealer device.  The ureters were retracted medially and kept out of  harm's way throughout the procedure.    At this point copious irrigation was used.  Excellent hemostasis was noted throughout.  The left ovary was then mobilized using sharp dissection of its attachments to the pericolonic fat.  The left ovarian vessels were skeletonized cauterized and divided with the vessel sealer device.  Prior to this, sharp and blunt dissection was used to mobilize the colon/rectum from adhesions to a peritoneal inclusion cyst.  There was no evidence of disease in this area.    At this point and after copious irrigation and confirmation of hemostasis the procedure was completed.  The HandPort was removed.  Moist laparotomy sponges which were used during the procedure to maximize exposure were removed and first count was reported as correct.  The fascia was closed using a running stitch of #1 looped PDS.  2 sutures were used started at the superior inferior aspect of the incision.  The subcutaneous fat was dao irrigated.  Excellent hemostasis was confirmed.  The subcutaneous fat was reapproximated with interrupted stitches of plain gut.  The skin at the minilaparotomy site was closed using a subcuticular stitch of 4-0 Monocryl STRATAFIX, the skin of all laparoscopic sites was closed using a subcuticular stitch of 4-0 Monocryl and Exofin was applied to all incisions.    The patient tolerated the procedure well.  Sponge, needle and instrument counts were reported correct x 2.  The patient was successfully extubated in the operating room and transferred to the postanesthetic care unit in stable condition.    PACU     This procedure was not performed to treat colon cancer through resection    Eusebio Valderrama MD, [place], FACOG, FACS  2/5/2025  8:41 PM

## 2025-02-06 NOTE — PROGRESS NOTES
Progress Note - GYN Oncology    Name: Esther Gonzalez 54 y.o. female I MRN: [id number]  Unit/Bed#: E5 -01 I Date of Admission: 2/5/2025   Date of Service: 2/6/2025 I Hospital Day: 1     Assessment & Plan  Diagnostic  laparoscopy, Bilateral salpingo-oopherectomy, resection of ovarian mass and minilaparotomy  - Originally presented on 1/22 to [place] ED for worsening intermittent abdominal pain with associated increasing abdominal girth for a year  - 1/22/2025 CT AP: 17 cm large cystic and solid right adnexal mass, consistent with a primary ovarian serous cystadenocarcinoma. The associated omental nodularity and trace ascites is highly concerning for peritoneal carcinomatosis   - Now POD#2 s/p dx lap, hand assisted right salpingo-oophorectomy, omentectomy, appendectomy, peritoneal biopsy, bilateral pelvic and para-aortic LND & mini laparotomy for ovarian mass  - IOFS significant for poorly differentiated ovarian carcinoma     Plan:   - Pain: tylenol ANNMARIE, geoffrey PRN, s/p epidural  - FEN: regular diet,  ml/hr, replete electrolytes PRN  - : s/p Ware, voiding spontaneously  - DVT ppx: SCD, SQH  - Disposition: anticipate d/c home if tolerates PO    Essential hypertension  Home meds: lisinopril 40mg qD, and amlodipine 5 mg qD    Plan:   - Consider reintroducing one medication at a time for improved BP control  Sickle cell trait (HCC)  Plan:   - F/u daily labs  Adnexal mass  - See plan above under diagnostic laparoscopy, bilateral salpingo-oophorectomy, resection of ovarian mass and minilaparotomy     GYNECOLOGY  Subjective   Esther reports feeling overall ok this morning, despite not getting much sleep. She has not yet passed gas or had a bowel movement. She is tolerating small sips of clear fluids, but reports that she does not have much of an appetite. She denies fevers, chills, nausea, vomiting, pain.     Objective :  Temp:  [97.4 °F (36.3 °C)-98 °F (36.7 °C)] 97.4 °F (36.3 °C)  HR:  [] 97  BP:  (135-165)/(71-96) 153/94  Resp:  [16-22] 17  SpO2:  [95 %-98 %] 97 %  O2 Device: None (Room air)    Physical Exam  Abdominal:      General: Bowel sounds are normal. There is distension.      Tenderness: There is no abdominal tenderness.      Comments: Mildly tympanitic  +BS  Laparoscopic sites clean dry/intact  Hand port site with minimal oozing, pressure dressing applied   Genitourinary:     Comments: Deferred  Musculoskeletal:         General: Normal range of motion.   Skin:     General: Skin is warm and dry.   Neurological:      General: No focal deficit present.      Mental Status: She is alert and oriented to person, place, and time.   Psychiatric:         Mood and Affect: Mood normal.         Lab Results: I have reviewed the following results:CBC/BMP:   .     02/06/25  0636   WBC 12.66*   HGB 13.3   HCT 39.9   *   SODIUM 141   K 3.6   *   CO2 23   BUN 5   CREATININE 0.79   GLUC 102   MG 1.8*    , Creatinine Clearance: Estimated Creatinine Clearance: 91.6 mL/min (by C-G formula based on SCr of 0.79 mg/dL).    Yasmine Hauser MD  OBGYN PGY2

## 2025-02-06 NOTE — ASSESSMENT & PLAN NOTE
- See plan above under diagnostic laparoscopy, bilateral salpingo-oophorectomy, resection of ovarian mass and minilaparotomy

## 2025-02-06 NOTE — PROGRESS NOTES
"Patient status post  Dx lap, hand assisted right salpingo-oophorectomy, omentectomy, appendectomy, peritoneal biopsy, bilateral pelvic and para-aortic LND & mini laparotomy for ovarian mass . Epidural catheter had been placed for post operative pain control.   I was contacted by Dr. Phillips this morning who requested the epidural be discontinued. No heparin was administered this morning prior to catheter being removed.   Catheter came out smoothly with an intact tip.  Vital signs are stable./95 (BP Location: Right arm)   Pulse 100   Temp 97.8 °F (36.6 °C) (Oral)   Resp 16   Ht 5' 2\" (1.575 m)   Wt 105 kg (230 lb 9.6 oz)   LMP 04/11/2021   SpO2 95%   BMI 42.18 kg/m²     "

## 2025-02-07 LAB
ALBUMIN SERPL BCG-MCNC: 3.3 G/DL (ref 3.5–5)
ALP SERPL-CCNC: 51 U/L (ref 34–104)
ALT SERPL W P-5'-P-CCNC: 7 U/L (ref 7–52)
ANION GAP SERPL CALCULATED.3IONS-SCNC: 4 MMOL/L (ref 4–13)
AST SERPL W P-5'-P-CCNC: 12 U/L (ref 13–39)
BILIRUB SERPL-MCNC: 0.31 MG/DL (ref 0.2–1)
BUN SERPL-MCNC: 6 MG/DL (ref 5–25)
CALCIUM ALBUM COR SERPL-MCNC: 9.6 MG/DL (ref 8.3–10.1)
CALCIUM SERPL-MCNC: 9 MG/DL (ref 8.4–10.2)
CHLORIDE SERPL-SCNC: 108 MMOL/L (ref 96–108)
CO2 SERPL-SCNC: 25 MMOL/L (ref 21–32)
CREAT SERPL-MCNC: 0.9 MG/DL (ref 0.6–1.3)
ERYTHROCYTE [DISTWIDTH] IN BLOOD BY AUTOMATED COUNT: 14.2 % (ref 11.6–15.1)
GFR SERPL CREATININE-BSD FRML MDRD: 72 ML/MIN/1.73SQ M
GLUCOSE SERPL-MCNC: 115 MG/DL (ref 65–140)
HCT VFR BLD AUTO: 36.1 % (ref 34.8–46.1)
HGB BLD-MCNC: 12.2 G/DL (ref 11.5–15.4)
MAGNESIUM SERPL-MCNC: 2.3 MG/DL (ref 1.9–2.7)
MCH RBC QN AUTO: 29.6 PG (ref 26.8–34.3)
MCHC RBC AUTO-ENTMCNC: 33.8 G/DL (ref 31.4–37.4)
MCV RBC AUTO: 88 FL (ref 82–98)
PLATELET # BLD AUTO: 369 THOUSANDS/UL (ref 149–390)
PMV BLD AUTO: 9.2 FL (ref 8.9–12.7)
POTASSIUM SERPL-SCNC: 4.1 MMOL/L (ref 3.5–5.3)
PROT SERPL-MCNC: 5.9 G/DL (ref 6.4–8.4)
RBC # BLD AUTO: 4.12 MILLION/UL (ref 3.81–5.12)
SODIUM SERPL-SCNC: 137 MMOL/L (ref 135–147)
WBC # BLD AUTO: 10.08 THOUSAND/UL (ref 4.31–10.16)

## 2025-02-07 PROCEDURE — 83735 ASSAY OF MAGNESIUM: CPT

## 2025-02-07 PROCEDURE — 80053 COMPREHEN METABOLIC PANEL: CPT

## 2025-02-07 PROCEDURE — 85027 COMPLETE CBC AUTOMATED: CPT

## 2025-02-07 PROCEDURE — 99024 POSTOP FOLLOW-UP VISIT: CPT | Performed by: OBSTETRICS & GYNECOLOGY

## 2025-02-07 RX ORDER — AMLODIPINE BESYLATE 5 MG/1
5 TABLET ORAL
Status: DISCONTINUED | OUTPATIENT
Start: 2025-02-07 | End: 2025-02-10 | Stop reason: HOSPADM

## 2025-02-07 RX ORDER — LISINOPRIL 20 MG/1
40 TABLET ORAL DAILY
Status: DISCONTINUED | OUTPATIENT
Start: 2025-02-07 | End: 2025-02-10 | Stop reason: HOSPADM

## 2025-02-07 RX ORDER — SODIUM CHLORIDE, SODIUM LACTATE, POTASSIUM CHLORIDE, CALCIUM CHLORIDE 600; 310; 30; 20 MG/100ML; MG/100ML; MG/100ML; MG/100ML
75 INJECTION, SOLUTION INTRAVENOUS CONTINUOUS
Status: DISCONTINUED | OUTPATIENT
Start: 2025-02-07 | End: 2025-02-08

## 2025-02-07 RX ADMIN — HEPARIN SODIUM 5000 UNITS: 5000 INJECTION INTRAVENOUS; SUBCUTANEOUS at 15:22

## 2025-02-07 RX ADMIN — SODIUM CHLORIDE, SODIUM LACTATE, POTASSIUM CHLORIDE, AND CALCIUM CHLORIDE 75 ML/HR: .6; .31; .03; .02 INJECTION, SOLUTION INTRAVENOUS at 18:16

## 2025-02-07 RX ADMIN — ONDANSETRON 4 MG: 2 INJECTION INTRAMUSCULAR; INTRAVENOUS at 18:49

## 2025-02-07 RX ADMIN — AMLODIPINE BESYLATE 5 MG: 5 TABLET ORAL at 22:49

## 2025-02-07 RX ADMIN — ONDANSETRON 4 MG: 2 INJECTION INTRAMUSCULAR; INTRAVENOUS at 12:40

## 2025-02-07 RX ADMIN — LISINOPRIL 40 MG: 20 TABLET ORAL at 15:22

## 2025-02-07 RX ADMIN — SIMETHICONE 80 MG: 80 TABLET, CHEWABLE ORAL at 15:19

## 2025-02-07 RX ADMIN — HEPARIN SODIUM 5000 UNITS: 5000 INJECTION INTRAVENOUS; SUBCUTANEOUS at 05:19

## 2025-02-07 RX ADMIN — HEPARIN SODIUM 5000 UNITS: 5000 INJECTION INTRAVENOUS; SUBCUTANEOUS at 22:49

## 2025-02-07 RX ADMIN — ACETAMINOPHEN 975 MG: 325 TABLET, FILM COATED ORAL at 12:39

## 2025-02-07 RX ADMIN — ONDANSETRON 4 MG: 2 INJECTION INTRAMUSCULAR; INTRAVENOUS at 05:19

## 2025-02-07 RX ADMIN — ACETAMINOPHEN 975 MG: 325 TABLET, FILM COATED ORAL at 05:19

## 2025-02-07 RX ADMIN — SIMETHICONE 80 MG: 80 TABLET, CHEWABLE ORAL at 22:49

## 2025-02-07 NOTE — PLAN OF CARE
Problem: PAIN - ADULT  Goal: Verbalizes/displays adequate comfort level or baseline comfort level  Description: Interventions:  - Encourage patient to monitor pain and request assistance  - Assess pain using appropriate pain scale  - Administer analgesics based on type and severity of pain and evaluate response  - Implement non-pharmacological measures as appropriate and evaluate response  - Consider cultural and social influences on pain and pain management  - Notify physician/advanced practitioner if interventions unsuccessful or patient reports new pain  Outcome: Progressing     Problem: INFECTION - ADULT  Goal: Absence or prevention of progression during hospitalization  Description: INTERVENTIONS:  - Assess and monitor for signs and symptoms of infection  - Monitor lab/diagnostic results  - Monitor all insertion sites, i.e. indwelling lines, tubes, and drains  - Monitor endotracheal if appropriate and nasal secretions for changes in amount and color  - [place] appropriate cooling/warming therapies per order  - Administer medications as ordered  - Instruct and encourage patient and family to use good hand hygiene technique  - Identify and instruct in appropriate isolation precautions for identified infection/condition  Outcome: Progressing  Goal: Absence of fever/infection during neutropenic period  Description: INTERVENTIONS:  - Monitor WBC    Outcome: Progressing     Problem: SAFETY ADULT  Goal: Patient will remain free of falls  Description: INTERVENTIONS:  - Educate patient/family on patient safety including physical limitations  - Instruct patient to call for assistance with activity   - Consult OT/PT to assist with strengthening/mobility   - Keep Call bell within reach  - Keep bed low and locked with side rails adjusted as appropriate  - Keep care items and personal belongings within reach  - Initiate and maintain comfort rounds  - Make Fall Risk Sign visible to staff  - Offer Toileting every 2 Hours,  in advance of need  - Initiate/Maintain bed alarm  - Obtain necessary fall risk management equipment:   - Apply yellow socks and bracelet for high fall risk patients  - Consider moving patient to room near nurses station  Outcome: Progressing  Goal: Maintain or return to baseline ADL function  Description: INTERVENTIONS:  -  Assess patient's ability to carry out ADLs; assess patient's baseline for ADL function and identify physical deficits which impact ability to perform ADLs (bathing, care of mouth/teeth, toileting, grooming, dressing, etc.)  - Assess/evaluate cause of self-care deficits   - Assess range of motion  - Assess patient's mobility; develop plan if impaired  - Assess patient's need for assistive devices and provide as appropriate  - Encourage maximum independence but intervene and supervise when necessary  - Involve family in performance of ADLs  - Assess for home care needs following discharge   - Consider OT consult to assist with ADL evaluation and planning for discharge  - Provide patient education as appropriate  Outcome: Progressing  Goal: Maintains/Returns to pre admission functional level  Description: INTERVENTIONS:  - Perform AM-PAC 6 Click Basic Mobility/ Daily Activity assessment daily.  - Set and communicate daily mobility goal to care team and patient/family/caregiver.   - Out of bed for toileting  - Record patient progress and toleration of activity level   Outcome: Progressing     Problem: DISCHARGE PLANNING  Goal: Discharge to home or other facility with appropriate resources  Description: INTERVENTIONS:  - Identify barriers to discharge w/patient and caregiver  - Arrange for needed discharge resources and transportation as appropriate  - Identify discharge learning needs (meds, wound care, etc.)  - Arrange for interpretive services to assist at discharge as needed  - Refer to Case Management Department for coordinating discharge planning if the patient needs post-hospital services  based on physician/advanced practitioner order or complex needs related to functional status, cognitive ability, or social support system  Outcome: Progressing     Problem: Knowledge Deficit  Goal: Patient/family/caregiver demonstrates understanding of disease process, treatment plan, medications, and discharge instructions  Description: Complete learning assessment and assess knowledge base.  Interventions:  - Provide teaching at level of understanding  - Provide teaching via preferred learning methods  Outcome: Progressing

## 2025-02-07 NOTE — PLAN OF CARE
Problem: PAIN - ADULT  Goal: Verbalizes/displays adequate comfort level or baseline comfort level  Description: Interventions:  - Encourage patient to monitor pain and request assistance  - Assess pain using appropriate pain scale  - Administer analgesics based on type and severity of pain and evaluate response  - Implement non-pharmacological measures as appropriate and evaluate response  - Consider cultural and social influences on pain and pain management  - Notify physician/advanced practitioner if interventions unsuccessful or patient reports new pain  Outcome: Progressing     Problem: INFECTION - ADULT  Goal: Absence or prevention of progression during hospitalization  Description: INTERVENTIONS:  - Assess and monitor for signs and symptoms of infection  - Monitor lab/diagnostic results  - Monitor all insertion sites, i.e. indwelling lines, tubes, and drains  - Monitor endotracheal if appropriate and nasal secretions for changes in amount and color  - [place] appropriate cooling/warming therapies per order  - Administer medications as ordered  - Instruct and encourage patient and family to use good hand hygiene technique  - Identify and instruct in appropriate isolation precautions for identified infection/condition  Outcome: Progressing  Goal: Absence of fever/infection during neutropenic period  Description: INTERVENTIONS:  - Monitor WBC    Outcome: Progressing     Problem: SAFETY ADULT  Goal: Patient will remain free of falls  Description: INTERVENTIONS:  - Educate patient/family on patient safety including physical limitations  - Instruct patient to call for assistance with activity   - Consult OT/PT to assist with strengthening/mobility   - Keep Call bell within reach  - Keep bed low and locked with side rails adjusted as appropriate  - Keep care items and personal belongings within reach  - Initiate and maintain comfort rounds  - Make Fall Risk Sign visible to staff  - Offer Toileting every 2 Hours,  in advance of need  - Initiate/Maintain bed alarm  - Obtain necessary fall risk management equipment:   - Apply yellow socks and bracelet for high fall risk patients  - Consider moving patient to room near nurses station  Outcome: Progressing  Goal: Maintain or return to baseline ADL function  Description: INTERVENTIONS:  -  Assess patient's ability to carry out ADLs; assess patient's baseline for ADL function and identify physical deficits which impact ability to perform ADLs (bathing, care of mouth/teeth, toileting, grooming, dressing, etc.)  - Assess/evaluate cause of self-care deficits   - Assess range of motion  - Assess patient's mobility; develop plan if impaired  - Assess patient's need for assistive devices and provide as appropriate  - Encourage maximum independence but intervene and supervise when necessary  - Involve family in performance of ADLs  - Assess for home care needs following discharge   - Consider OT consult to assist with ADL evaluation and planning for discharge  - Provide patient education as appropriate  Outcome: Progressing  Goal: Maintains/Returns to pre admission functional level  Description: INTERVENTIONS:  - Perform AM-PAC 6 Click Basic Mobility/ Daily Activity assessment daily.  - Set and communicate daily mobility goal to care team and patient/family/caregiver.   - Out of bed for toileting  - Record patient progress and toleration of activity level   Outcome: Progressing     Problem: DISCHARGE PLANNING  Goal: Discharge to home or other facility with appropriate resources  Description: INTERVENTIONS:  - Identify barriers to discharge w/patient and caregiver  - Arrange for needed discharge resources and transportation as appropriate  - Identify discharge learning needs (meds, wound care, etc.)  - Arrange for interpretive services to assist at discharge as needed  - Refer to Case Management Department for coordinating discharge planning if the patient needs post-hospital services  based on physician/advanced practitioner order or complex needs related to functional status, cognitive ability, or social support system  Outcome: Progressing     Problem: Knowledge Deficit  Goal: Patient/family/caregiver demonstrates understanding of disease process, treatment plan, medications, and discharge instructions  Description: Complete learning assessment and assess knowledge base.  Interventions:  - Provide teaching at level of understanding  - Provide teaching via preferred learning methods  Outcome: Progressing

## 2025-02-07 NOTE — DISCHARGE SUMMARY
Discharge Summary - Gynecologic Oncology  Esther Gonzalez 54 y.o. female MRN: [id number]  Unit/Bed#: E5 -01 Encounter: [id number]    Admission Date: 2/5/2025   Discharge Date: 02/10/25     Attending Physician: Dr. Valderrama    Consulting Physician(s): none    Admitting Diagnosis:   Adnexal mass [N94.89]    Discharge Diagnosis:   Adnexal mass  S/p diagnostic laparoscopy    Procedures Performed:   DIAGNOSTIC LAPAROSCOPY, LAPAROSCOPIC HAND ASSISTED RIGHT SALPINGO-OOPHORECTOMY, INDICATED APPENDECTOMY, INFRACOLIC OMENTECTOMY,  MINI LAPAROTOMY WITH ADHESIOLYSIS, LEFT OOPHORECTOMY, STAGING PERITONEAL BIOPSIES AND BILATERAL PELVIC AND PARAAORTIC LYMPHADENECTOMY    Hospital Course: The patient presented on day of admission for the above mentioned procedure. Her procedure was uncomplicated. 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 She was admitted for pain control and postoperative management. On POD#1, epidural was removed and she passed her void trial. Heparin was initiated for DVT prophylaxis.  She was advanced to a regular diet, but experienced nausea/vomiting, and diet was changed to clear liquids. On POD#2, she still had not passed gas. Her diet was advanced but she began vomiting again and she was made NPO. On POD#3, she was advanced to CLD and then advanced to a regular diet. On POF#4 patient experienced severe abdominal pain after eating solids and was kept overnight for observation. On POD#5 patient reported resolution of her abdominal pain and she was able to tolerated solid food. She was determined to be cleared for discharge. She was asked to follow up with Dr. Valderrama in 2 weeks for a postoperative appointment.     Lab Results:   Lab Results   Component Value Date    WBC 7.50 02/09/2025    HGB 12.3 02/09/2025    HCT 36.5 02/09/2025    MCV 89 02/09/2025     02/09/2025     Lab Results   Component Value Date    GLUCOSE 90 09/11/2015    CALCIUM 9.3 02/09/2025      "09/11/2015    K 3.5 02/09/2025    CO2 24 02/09/2025     02/09/2025    BUN 5 02/09/2025    CREATININE 0.74 02/09/2025     No results found for: \"POCGLU\"  Lab Results   Component Value Date    PTT 29 01/22/2025     Lab Results   Component Value Date    INR 1.09 01/22/2025    PROTIME 14.3 01/22/2025       Pathology: pending    Condition at Discharge: good     Discharge Medications: See after visit summary for reconciled discharge medications provided to patient and family.      Discharge instructions/Information to patient and family: See after visit summary for information provided to patient and family.      Provisions for Follow-Up Care: See after visit summary for information related to follow-up care and any pertinent home health orders.      Disposition: Home    Planned Readmission: No    Code Status: Level 1- Full code    Judy Souza  PGY-1 OB/GYN  02/10/25  5:05 PM   " [id number]

## 2025-02-08 LAB
ALBUMIN SERPL BCG-MCNC: 3.3 G/DL (ref 3.5–5)
ALP SERPL-CCNC: 50 U/L (ref 34–104)
ALT SERPL W P-5'-P-CCNC: 9 U/L (ref 7–52)
ANION GAP SERPL CALCULATED.3IONS-SCNC: 7 MMOL/L (ref 4–13)
AST SERPL W P-5'-P-CCNC: 15 U/L (ref 13–39)
BILIRUB SERPL-MCNC: 0.32 MG/DL (ref 0.2–1)
BUN SERPL-MCNC: 6 MG/DL (ref 5–25)
CALCIUM ALBUM COR SERPL-MCNC: 9.7 MG/DL (ref 8.3–10.1)
CALCIUM SERPL-MCNC: 9.1 MG/DL (ref 8.4–10.2)
CHLORIDE SERPL-SCNC: 106 MMOL/L (ref 96–108)
CO2 SERPL-SCNC: 26 MMOL/L (ref 21–32)
CREAT SERPL-MCNC: 0.86 MG/DL (ref 0.6–1.3)
ERYTHROCYTE [DISTWIDTH] IN BLOOD BY AUTOMATED COUNT: 14.3 % (ref 11.6–15.1)
GFR SERPL CREATININE-BSD FRML MDRD: 76 ML/MIN/1.73SQ M
GLUCOSE SERPL-MCNC: 99 MG/DL (ref 65–140)
HCT VFR BLD AUTO: 37.7 % (ref 34.8–46.1)
HGB BLD-MCNC: 12.6 G/DL (ref 11.5–15.4)
MAGNESIUM SERPL-MCNC: 1.9 MG/DL (ref 1.9–2.7)
MCH RBC QN AUTO: 29.4 PG (ref 26.8–34.3)
MCHC RBC AUTO-ENTMCNC: 33.4 G/DL (ref 31.4–37.4)
MCV RBC AUTO: 88 FL (ref 82–98)
PLATELET # BLD AUTO: 388 THOUSANDS/UL (ref 149–390)
PMV BLD AUTO: 9.4 FL (ref 8.9–12.7)
POTASSIUM SERPL-SCNC: 3.6 MMOL/L (ref 3.5–5.3)
PROT SERPL-MCNC: 6.3 G/DL (ref 6.4–8.4)
RBC # BLD AUTO: 4.28 MILLION/UL (ref 3.81–5.12)
SODIUM SERPL-SCNC: 139 MMOL/L (ref 135–147)
WBC # BLD AUTO: 10.28 THOUSAND/UL (ref 4.31–10.16)

## 2025-02-08 PROCEDURE — 80053 COMPREHEN METABOLIC PANEL: CPT

## 2025-02-08 PROCEDURE — 85027 COMPLETE CBC AUTOMATED: CPT

## 2025-02-08 PROCEDURE — 99024 POSTOP FOLLOW-UP VISIT: CPT | Performed by: OBSTETRICS & GYNECOLOGY

## 2025-02-08 PROCEDURE — 83735 ASSAY OF MAGNESIUM: CPT

## 2025-02-08 PROCEDURE — 88305 TISSUE EXAM BY PATHOLOGIST: CPT | Performed by: PATHOLOGY

## 2025-02-08 PROCEDURE — 88341 IMHCHEM/IMCYTCHM EA ADD ANTB: CPT | Performed by: PATHOLOGY

## 2025-02-08 PROCEDURE — 88112 CYTOPATH CELL ENHANCE TECH: CPT | Performed by: PATHOLOGY

## 2025-02-08 PROCEDURE — 88342 IMHCHEM/IMCYTCHM 1ST ANTB: CPT | Performed by: PATHOLOGY

## 2025-02-08 RX ORDER — POTASSIUM CHLORIDE 14.9 MG/ML
20 INJECTION INTRAVENOUS ONCE
Status: COMPLETED | OUTPATIENT
Start: 2025-02-08 | End: 2025-02-08

## 2025-02-08 RX ORDER — POTASSIUM CHLORIDE 29.8 MG/ML
40 INJECTION INTRAVENOUS ONCE
Status: DISCONTINUED | OUTPATIENT
Start: 2025-02-08 | End: 2025-02-08 | Stop reason: SDUPTHER

## 2025-02-08 RX ADMIN — ACETAMINOPHEN 975 MG: 325 TABLET, FILM COATED ORAL at 03:14

## 2025-02-08 RX ADMIN — AMLODIPINE BESYLATE 5 MG: 5 TABLET ORAL at 22:08

## 2025-02-08 RX ADMIN — LISINOPRIL 40 MG: 20 TABLET ORAL at 10:01

## 2025-02-08 RX ADMIN — HEPARIN SODIUM 5000 UNITS: 5000 INJECTION INTRAVENOUS; SUBCUTANEOUS at 06:32

## 2025-02-08 RX ADMIN — POTASSIUM CHLORIDE 20 MEQ: 14.9 INJECTION, SOLUTION INTRAVENOUS at 09:51

## 2025-02-08 RX ADMIN — POTASSIUM CHLORIDE 20 MEQ: 14.9 INJECTION, SOLUTION INTRAVENOUS at 12:08

## 2025-02-08 RX ADMIN — SIMETHICONE 80 MG: 80 TABLET, CHEWABLE ORAL at 06:32

## 2025-02-08 RX ADMIN — SODIUM CHLORIDE, SODIUM LACTATE, POTASSIUM CHLORIDE, AND CALCIUM CHLORIDE 75 ML/HR: .6; .31; .03; .02 INJECTION, SOLUTION INTRAVENOUS at 07:39

## 2025-02-08 RX ADMIN — HEPARIN SODIUM 5000 UNITS: 5000 INJECTION INTRAVENOUS; SUBCUTANEOUS at 14:08

## 2025-02-08 RX ADMIN — ACETAMINOPHEN 975 MG: 325 TABLET, FILM COATED ORAL at 23:07

## 2025-02-08 RX ADMIN — ACETAMINOPHEN 975 MG: 325 TABLET, FILM COATED ORAL at 17:52

## 2025-02-08 RX ADMIN — ACETAMINOPHEN 975 MG: 325 TABLET, FILM COATED ORAL at 12:08

## 2025-02-08 RX ADMIN — HEPARIN SODIUM 5000 UNITS: 5000 INJECTION INTRAVENOUS; SUBCUTANEOUS at 22:06

## 2025-02-08 RX ADMIN — SIMETHICONE 80 MG: 80 TABLET, CHEWABLE ORAL at 23:08

## 2025-02-08 RX ADMIN — SIMETHICONE 80 MG: 80 TABLET, CHEWABLE ORAL at 12:08

## 2025-02-08 NOTE — PROGRESS NOTES
Progress Note - GYN Oncology   Name: Esther Gonzalez 54 y.o. female I MRN: [id number]  Unit/Bed#: E5 -01 I Date of Admission: 2/5/2025   Date of Service: 2/8/2025 I Hospital Day: 3     Assessment & Plan  Diagnostic  laparoscopy, Bilateral salpingo-oopherectomy, resection of ovarian mass and minilaparotomy  - Originally presented on 1/22 to [facility] for worsening intermittent abdominal pain with associated increasing abdominal girth for a year  - 1/22/2025 CT AP: 17 cm large cystic and solid right adnexal mass, consistent with a primary ovarian serous cystadenocarcinoma. The associated omental nodularity and trace ascites is highly concerning for peritoneal carcinomatosis   - Now POD#2 s/p dx lap, hand assisted right salpingo-oophorectomy, omentectomy, appendectomy, peritoneal biopsy, bilateral pelvic and para-aortic LND & mini laparotomy for ovarian mass  - IOFS significant for poorly differentiated ovarian carcinoma     Plan:   - Pain: tylenol ANNMARIE, geoffrey PRN, s/p epidural  - FEN: NPO, 75 ml/hr, replete electrolytes PRN, zofran annmarie; maintain bowel rest and maintenance fluids for suspected ileus, and consider advancement to CLD tonight if no nausea or vomiting today  - : s/p Ware, voiding spontaneously  - DVT ppx: SCD, SQH  - Disposition: anticipate d/c home if tolerates PO    Essential hypertension  Home meds: lisinopril 40mg qD, and amlodipine 5 mg qD    Plan:   - On home meds  Sickle cell trait (HCC)  Plan:   - F/u daily labs  Adnexal mass  - See plan above under diagnostic laparoscopy, bilateral salpingo-oophorectomy, resection of ovarian mass and minilaparotomy     24 Hour Events : Overnight, had a vomiting episode. Otherwise overnight was uneventful.  Subjective : Patient is doing okay. She is motivated to be discharged. She reports her pain is well controlled with medications. She had a vomiting episode at midnight. She was given simethicone around that time, and feels much better and has not had  any nausea or vomiting since then. She is void spontaneously. She has not passed flatus or had a BM. She feels like there is gas stuck, and is ready to come out. She denies chest pain or shortness of breath. She denies fever/chills.     Objective :  Temp:  [98.3 °F (36.8 °C)-99.2 °F (37.3 °C)] 98.3 °F (36.8 °C)  HR:  [85-97] 97  BP: (130-153)/(82-98) 153/98  Resp:  [12-20] 12  SpO2:  [96 %-98 %] 96 %  O2 Device: None (Room air)    Physical Exam  Vitals and nursing note reviewed.   Constitutional:       General: She is not in acute distress.     Appearance: Normal appearance.   HENT:      Head: Normocephalic and atraumatic.   Eyes:      Extraocular Movements: Extraocular movements intact.   Cardiovascular:      Rate and Rhythm: Normal rate and regular rhythm.      Pulses: Normal pulses.      Heart sounds: Normal heart sounds.   Pulmonary:      Effort: Pulmonary effort is normal. No respiratory distress.      Breath sounds: Normal breath sounds.   Abdominal:      General: Abdomen is flat. Bowel sounds are normal. There is distension.      Palpations: Abdomen is soft.      Tenderness: There is no abdominal tenderness.      Comments: Mild distention  Port sites C/D/I  Mini-lap site C/D/I   Musculoskeletal:      Cervical back: Normal range of motion.   Skin:     General: Skin is warm and dry.      Capillary Refill: Capillary refill takes less than 2 seconds.   Neurological:      General: No focal deficit present.      Mental Status: She is alert. Mental status is at baseline.   Psychiatric:         Mood and Affect: Mood normal.         Behavior: Behavior normal.         Lab Results: I have reviewed the following results:CBC/BMP:   .     02/08/25  0706   WBC 10.28*   HGB 12.6   HCT 37.7           Imaging Results Review: No pertinent imaging studies reviewed.  Other Study Results Review: No additional pertinent studies reviewed.    VTE Pharmacologic Prophylaxis: Heparin  VTE Mechanical Prophylaxis: sequential  compression device

## 2025-02-08 NOTE — PLAN OF CARE
Problem: PAIN - ADULT  Goal: Verbalizes/displays adequate comfort level or baseline comfort level  Description: Interventions:  - Encourage patient to monitor pain and request assistance  - Assess pain using appropriate pain scale  - Administer analgesics based on type and severity of pain and evaluate response  - Implement non-pharmacological measures as appropriate and evaluate response  - Consider cultural and social influences on pain and pain management  - Notify physician/advanced practitioner if interventions unsuccessful or patient reports new pain  Outcome: Progressing     Problem: INFECTION - ADULT  Goal: Absence or prevention of progression during hospitalization  Description: INTERVENTIONS:  - Assess and monitor for signs and symptoms of infection  - Monitor lab/diagnostic results  - Monitor all insertion sites, i.e. indwelling lines, tubes, and drains  - Monitor endotracheal if appropriate and nasal secretions for changes in amount and color  - Simpson appropriate cooling/warming therapies per order  - Administer medications as ordered  - Instruct and encourage patient and family to use good hand hygiene technique  - Identify and instruct in appropriate isolation precautions for identified infection/condition  Outcome: Progressing  Goal: Absence of fever/infection during neutropenic period  Description: INTERVENTIONS:  - Monitor WBC    Outcome: Progressing     Problem: SAFETY ADULT  Goal: Patient will remain free of falls  Description: INTERVENTIONS:  - Educate patient/family on patient safety including physical limitations  - Instruct patient to call for assistance with activity   - Consult OT/PT to assist with strengthening/mobility   - Keep Call bell within reach  - Keep bed low and locked with side rails adjusted as appropriate  - Keep care items and personal belongings within reach  - Initiate and maintain comfort rounds  - Make Fall Risk Sign visible to staff  - Offer Toileting every 2 Hours,  in advance of need  - Initiate/Maintain bed alarm  - Obtain necessary fall risk management equipment:   - Apply yellow socks and bracelet for high fall risk patients  - Consider moving patient to room near nurses station  Outcome: Progressing  Goal: Maintain or return to baseline ADL function  Description: INTERVENTIONS:  -  Assess patient's ability to carry out ADLs; assess patient's baseline for ADL function and identify physical deficits which impact ability to perform ADLs (bathing, care of mouth/teeth, toileting, grooming, dressing, etc.)  - Assess/evaluate cause of self-care deficits   - Assess range of motion  - Assess patient's mobility; develop plan if impaired  - Assess patient's need for assistive devices and provide as appropriate  - Encourage maximum independence but intervene and supervise when necessary  - Involve family in performance of ADLs  - Assess for home care needs following discharge   - Consider OT consult to assist with ADL evaluation and planning for discharge  - Provide patient education as appropriate  Outcome: Progressing  Goal: Maintains/Returns to pre admission functional level  Description: INTERVENTIONS:  - Perform AM-PAC 6 Click Basic Mobility/ Daily Activity assessment daily.  - Set and communicate daily mobility goal to care team and patient/family/caregiver.   - Out of bed for toileting  - Record patient progress and toleration of activity level   Outcome: Progressing     Problem: DISCHARGE PLANNING  Goal: Discharge to home or other facility with appropriate resources  Description: INTERVENTIONS:  - Identify barriers to discharge w/patient and caregiver  - Arrange for needed discharge resources and transportation as appropriate  - Identify discharge learning needs (meds, wound care, etc.)  - Arrange for interpretive services to assist at discharge as needed  - Refer to Case Management Department for coordinating discharge planning if the patient needs post-hospital services  based on physician/advanced practitioner order or complex needs related to functional status, cognitive ability, or social support system  Outcome: Progressing     Problem: Knowledge Deficit  Goal: Patient/family/caregiver demonstrates understanding of disease process, treatment plan, medications, and discharge instructions  Description: Complete learning assessment and assess knowledge base.  Interventions:  - Provide teaching at level of understanding  - Provide teaching via preferred learning methods  Outcome: Progressing

## 2025-02-08 NOTE — ASSESSMENT & PLAN NOTE
- Originally presented on 1/22 to [facility] ED for worsening intermittent abdominal pain with associated increasing abdominal girth for a year  - 1/22/2025 CT AP: 17 cm large cystic and solid right adnexal mass, consistent with a primary ovarian serous cystadenocarcinoma. The associated omental nodularity and trace ascites is highly concerning for peritoneal carcinomatosis   - Now POD#2 s/p dx lap, hand assisted right salpingo-oophorectomy, omentectomy, appendectomy, peritoneal biopsy, bilateral pelvic and para-aortic LND & mini laparotomy for ovarian mass  - IOFS significant for poorly differentiated ovarian carcinoma     Plan:   - Pain: tylenol CHASITY, geoffrey PRN, s/p epidural  - FEN: NPO, 75 ml/hr, replete electrolytes PRN, zofran chasity; maintain bowel rest and maintenance fluids for suspected ileus, and consider advancement to CLD tonight if no nausea or vomiting today  - : s/p Ware, voiding spontaneously  - DVT ppx: SCD, SQH  - Disposition: anticipate d/c home if tolerates PO

## 2025-02-08 NOTE — PLAN OF CARE
Problem: PAIN - ADULT  Goal: Verbalizes/displays adequate comfort level or baseline comfort level  Description: Interventions:  - Encourage patient to monitor pain and request assistance  - Assess pain using appropriate pain scale  - Administer analgesics based on type and severity of pain and evaluate response  - Implement non-pharmacological measures as appropriate and evaluate response  - Consider cultural and social influences on pain and pain management  - Notify physician/advanced practitioner if interventions unsuccessful or patient reports new pain  Outcome: Progressing     Problem: INFECTION - ADULT  Goal: Absence or prevention of progression during hospitalization  Description: INTERVENTIONS:  - Assess and monitor for signs and symptoms of infection  - Monitor lab/diagnostic results  - Monitor all insertion sites, i.e. indwelling lines, tubes, and drains  - Monitor endotracheal if appropriate and nasal secretions for changes in amount and color  - Lynd appropriate cooling/warming therapies per order  - Administer medications as ordered  - Instruct and encourage patient and family to use good hand hygiene technique  - Identify and instruct in appropriate isolation precautions for identified infection/condition  Outcome: Progressing  Goal: Absence of fever/infection during neutropenic period  Description: INTERVENTIONS:  - Monitor WBC    Outcome: Progressing     Problem: SAFETY ADULT  Goal: Patient will remain free of falls  Description: INTERVENTIONS:  - Educate patient/family on patient safety including physical limitations  - Instruct patient to call for assistance with activity   - Consult OT/PT to assist with strengthening/mobility   - Keep Call bell within reach  - Keep bed low and locked with side rails adjusted as appropriate  - Keep care items and personal belongings within reach  - Initiate and maintain comfort rounds  - Make Fall Risk Sign visible to staff  - Offer Toileting every 2 Hours,  in advance of need  - Initiate/Maintain bed alarm  - Obtain necessary fall risk management equipment:   - Apply yellow socks and bracelet for high fall risk patients  - Consider moving patient to room near nurses station  Outcome: Progressing  Goal: Maintain or return to baseline ADL function  Description: INTERVENTIONS:  -  Assess patient's ability to carry out ADLs; assess patient's baseline for ADL function and identify physical deficits which impact ability to perform ADLs (bathing, care of mouth/teeth, toileting, grooming, dressing, etc.)  - Assess/evaluate cause of self-care deficits   - Assess range of motion  - Assess patient's mobility; develop plan if impaired  - Assess patient's need for assistive devices and provide as appropriate  - Encourage maximum independence but intervene and supervise when necessary  - Involve family in performance of ADLs  - Assess for home care needs following discharge   - Consider OT consult to assist with ADL evaluation and planning for discharge  - Provide patient education as appropriate  Outcome: Progressing  Goal: Maintains/Returns to pre admission functional level  Description: INTERVENTIONS:  - Perform AM-PAC 6 Click Basic Mobility/ Daily Activity assessment daily.  - Set and communicate daily mobility goal to care team and patient/family/caregiver.   - Out of bed for toileting  - Record patient progress and toleration of activity level   Outcome: Progressing     Problem: DISCHARGE PLANNING  Goal: Discharge to home or other facility with appropriate resources  Description: INTERVENTIONS:  - Identify barriers to discharge w/patient and caregiver  - Arrange for needed discharge resources and transportation as appropriate  - Identify discharge learning needs (meds, wound care, etc.)  - Arrange for interpretive services to assist at discharge as needed  - Refer to Case Management Department for coordinating discharge planning if the patient needs post-hospital services  based on physician/advanced practitioner order or complex needs related to functional status, cognitive ability, or social support system  Outcome: Progressing     Problem: Knowledge Deficit  Goal: Patient/family/caregiver demonstrates understanding of disease process, treatment plan, medications, and discharge instructions  Description: Complete learning assessment and assess knowledge base.  Interventions:  - Provide teaching at level of understanding  - Provide teaching via preferred learning methods  Outcome: Progressing

## 2025-02-09 LAB
ALBUMIN SERPL BCG-MCNC: 3.3 G/DL (ref 3.5–5)
ALP SERPL-CCNC: 43 U/L (ref 34–104)
ALT SERPL W P-5'-P-CCNC: 16 U/L (ref 7–52)
ANION GAP SERPL CALCULATED.3IONS-SCNC: 6 MMOL/L (ref 4–13)
AST SERPL W P-5'-P-CCNC: 24 U/L (ref 13–39)
BILIRUB SERPL-MCNC: 0.31 MG/DL (ref 0.2–1)
BUN SERPL-MCNC: 5 MG/DL (ref 5–25)
CALCIUM ALBUM COR SERPL-MCNC: 9.9 MG/DL (ref 8.3–10.1)
CALCIUM SERPL-MCNC: 9.3 MG/DL (ref 8.4–10.2)
CHLORIDE SERPL-SCNC: 107 MMOL/L (ref 96–108)
CO2 SERPL-SCNC: 24 MMOL/L (ref 21–32)
CREAT SERPL-MCNC: 0.74 MG/DL (ref 0.6–1.3)
ERYTHROCYTE [DISTWIDTH] IN BLOOD BY AUTOMATED COUNT: 14.4 % (ref 11.6–15.1)
GFR SERPL CREATININE-BSD FRML MDRD: 91 ML/MIN/1.73SQ M
GLUCOSE SERPL-MCNC: 104 MG/DL (ref 65–140)
HCT VFR BLD AUTO: 36.5 % (ref 34.8–46.1)
HGB BLD-MCNC: 12.3 G/DL (ref 11.5–15.4)
MAGNESIUM SERPL-MCNC: 1.7 MG/DL (ref 1.9–2.7)
MCH RBC QN AUTO: 29.9 PG (ref 26.8–34.3)
MCHC RBC AUTO-ENTMCNC: 33.7 G/DL (ref 31.4–37.4)
MCV RBC AUTO: 89 FL (ref 82–98)
PLATELET # BLD AUTO: 382 THOUSANDS/UL (ref 149–390)
PMV BLD AUTO: 9.3 FL (ref 8.9–12.7)
POTASSIUM SERPL-SCNC: 3.5 MMOL/L (ref 3.5–5.3)
PROT SERPL-MCNC: 5.9 G/DL (ref 6.4–8.4)
RBC # BLD AUTO: 4.11 MILLION/UL (ref 3.81–5.12)
SODIUM SERPL-SCNC: 137 MMOL/L (ref 135–147)
WBC # BLD AUTO: 7.5 THOUSAND/UL (ref 4.31–10.16)

## 2025-02-09 PROCEDURE — 85027 COMPLETE CBC AUTOMATED: CPT

## 2025-02-09 PROCEDURE — 83735 ASSAY OF MAGNESIUM: CPT

## 2025-02-09 PROCEDURE — 80053 COMPREHEN METABOLIC PANEL: CPT

## 2025-02-09 PROCEDURE — 99024 POSTOP FOLLOW-UP VISIT: CPT | Performed by: OBSTETRICS & GYNECOLOGY

## 2025-02-09 RX ORDER — POTASSIUM CHLORIDE 1500 MG/1
40 TABLET, EXTENDED RELEASE ORAL ONCE
Status: COMPLETED | OUTPATIENT
Start: 2025-02-09 | End: 2025-02-09

## 2025-02-09 RX ORDER — MAGNESIUM SULFATE HEPTAHYDRATE 40 MG/ML
2 INJECTION, SOLUTION INTRAVENOUS ONCE
Status: COMPLETED | OUTPATIENT
Start: 2025-02-09 | End: 2025-02-09

## 2025-02-09 RX ADMIN — ACETAMINOPHEN 975 MG: 325 TABLET, FILM COATED ORAL at 11:31

## 2025-02-09 RX ADMIN — MAGNESIUM SULFATE HEPTAHYDRATE 2 G: 40 INJECTION, SOLUTION INTRAVENOUS at 08:49

## 2025-02-09 RX ADMIN — ACETAMINOPHEN 975 MG: 325 TABLET, FILM COATED ORAL at 23:17

## 2025-02-09 RX ADMIN — HEPARIN SODIUM 5000 UNITS: 5000 INJECTION INTRAVENOUS; SUBCUTANEOUS at 22:08

## 2025-02-09 RX ADMIN — HEPARIN SODIUM 5000 UNITS: 5000 INJECTION INTRAVENOUS; SUBCUTANEOUS at 05:06

## 2025-02-09 RX ADMIN — HEPARIN SODIUM 5000 UNITS: 5000 INJECTION INTRAVENOUS; SUBCUTANEOUS at 13:44

## 2025-02-09 RX ADMIN — AMLODIPINE BESYLATE 5 MG: 5 TABLET ORAL at 22:08

## 2025-02-09 RX ADMIN — ACETAMINOPHEN 975 MG: 325 TABLET, FILM COATED ORAL at 05:06

## 2025-02-09 RX ADMIN — SIMETHICONE 80 MG: 80 TABLET, CHEWABLE ORAL at 17:49

## 2025-02-09 RX ADMIN — SIMETHICONE 80 MG: 80 TABLET, CHEWABLE ORAL at 11:37

## 2025-02-09 RX ADMIN — SIMETHICONE 80 MG: 80 TABLET, CHEWABLE ORAL at 23:17

## 2025-02-09 RX ADMIN — POTASSIUM CHLORIDE 40 MEQ: 1500 TABLET, EXTENDED RELEASE ORAL at 08:48

## 2025-02-09 RX ADMIN — ACETAMINOPHEN 975 MG: 325 TABLET, FILM COATED ORAL at 17:41

## 2025-02-09 RX ADMIN — ONDANSETRON 4 MG: 2 INJECTION INTRAMUSCULAR; INTRAVENOUS at 12:18

## 2025-02-09 RX ADMIN — LISINOPRIL 40 MG: 20 TABLET ORAL at 08:43

## 2025-02-09 NOTE — PLAN OF CARE
Problem: PAIN - ADULT  Goal: Verbalizes/displays adequate comfort level or baseline comfort level  Description: Interventions:  - Encourage patient to monitor pain and request assistance  - Assess pain using appropriate pain scale  - Administer analgesics based on type and severity of pain and evaluate response  - Implement non-pharmacological measures as appropriate and evaluate response  - Consider cultural and social influences on pain and pain management  - Notify physician/advanced practitioner if interventions unsuccessful or patient reports new pain  Outcome: Progressing     Problem: INFECTION - ADULT  Goal: Absence or prevention of progression during hospitalization  Description: INTERVENTIONS:  - Assess and monitor for signs and symptoms of infection  - Monitor lab/diagnostic results  - Monitor all insertion sites, i.e. indwelling lines, tubes, and drains  - Monitor endotracheal if appropriate and nasal secretions for changes in amount and color  - Chambers appropriate cooling/warming therapies per order  - Administer medications as ordered  - Instruct and encourage patient and family to use good hand hygiene technique  - Identify and instruct in appropriate isolation precautions for identified infection/condition  Outcome: Progressing  Goal: Absence of fever/infection during neutropenic period  Description: INTERVENTIONS:  - Monitor WBC    Outcome: Progressing     Problem: SAFETY ADULT  Goal: Patient will remain free of falls  Description: INTERVENTIONS:  - Educate patient/family on patient safety including physical limitations  - Instruct patient to call for assistance with activity   - Consult OT/PT to assist with strengthening/mobility   - Keep Call bell within reach  - Keep bed low and locked with side rails adjusted as appropriate  - Keep care items and personal belongings within reach  - Initiate and maintain comfort rounds  - Make Fall Risk Sign visible to staff  - Offer Toileting every 2 Hours,  in advance of need  - Initiate/Maintain bed alarm  - Obtain necessary fall risk management equipment:   - Apply yellow socks and bracelet for high fall risk patients  - Consider moving patient to room near nurses station  Outcome: Progressing  Goal: Maintain or return to baseline ADL function  Description: INTERVENTIONS:  -  Assess patient's ability to carry out ADLs; assess patient's baseline for ADL function and identify physical deficits which impact ability to perform ADLs (bathing, care of mouth/teeth, toileting, grooming, dressing, etc.)  - Assess/evaluate cause of self-care deficits   - Assess range of motion  - Assess patient's mobility; develop plan if impaired  - Assess patient's need for assistive devices and provide as appropriate  - Encourage maximum independence but intervene and supervise when necessary  - Involve family in performance of ADLs  - Assess for home care needs following discharge   - Consider OT consult to assist with ADL evaluation and planning for discharge  - Provide patient education as appropriate  Outcome: Progressing  Goal: Maintains/Returns to pre admission functional level  Description: INTERVENTIONS:  - Perform AM-PAC 6 Click Basic Mobility/ Daily Activity assessment daily.  - Set and communicate daily mobility goal to care team and patient/family/caregiver.   - Out of bed for toileting  - Record patient progress and toleration of activity level   Outcome: Progressing     Problem: DISCHARGE PLANNING  Goal: Discharge to home or other facility with appropriate resources  Description: INTERVENTIONS:  - Identify barriers to discharge w/patient and caregiver  - Arrange for needed discharge resources and transportation as appropriate  - Identify discharge learning needs (meds, wound care, etc.)  - Arrange for interpretive services to assist at discharge as needed  - Refer to Case Management Department for coordinating discharge planning if the patient needs post-hospital services  based on physician/advanced practitioner order or complex needs related to functional status, cognitive ability, or social support system  Outcome: Progressing     Problem: Knowledge Deficit  Goal: Patient/family/caregiver demonstrates understanding of disease process, treatment plan, medications, and discharge instructions  Description: Complete learning assessment and assess knowledge base.  Interventions:  - Provide teaching at level of understanding  - Provide teaching via preferred learning methods  Outcome: Progressing

## 2025-02-09 NOTE — ASSESSMENT & PLAN NOTE
- Originally presented on 1/22 to [facility] for worsening intermittent abdominal pain with associated increasing abdominal girth for a year  - 1/22/2025 CT AP: 17 cm large cystic and solid right adnexal mass, consistent with a primary ovarian serous cystadenocarcinoma. The associated omental nodularity and trace ascites is highly concerning for peritoneal carcinomatosis   - Now POD#4 s/p dx lap, hand assisted right salpingo-oophorectomy, omentectomy, appendectomy, peritoneal biopsy, bilateral pelvic and para-aortic LND & mini laparotomy for ovarian mass  - IOFS significant for poorly differentiated ovarian carcinoma     Plan:   - Pain: tylenol CHASITY, geoffrey PRN, s/p epidural  - FEN: regular, replete electrolytes PRN, zofran chasity  - : s/p Ware, voiding spontaneously  - DVT ppx: SCD, SQH  - Disposition: anticipate d/c home today

## 2025-02-09 NOTE — PROGRESS NOTES
Progress Note - GYN Oncology   Name: Esther Gonzalez 54 y.o. female I MRN: [id number]  Unit/Bed#: E5 -01 I Date of Admission: 2/5/2025   Date of Service: 2/9/2025 I Hospital Day: 4     Assessment & Plan  Diagnostic  laparoscopy, Bilateral salpingo-oopherectomy, resection of ovarian mass and minilaparotomy  - Originally presented on 1/22 to [facility] for worsening intermittent abdominal pain with associated increasing abdominal girth for a year  - 1/22/2025 CT AP: 17 cm large cystic and solid right adnexal mass, consistent with a primary ovarian serous cystadenocarcinoma. 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   Subjective : POD#4. She is doing well. She reports not getting her regular diet yesterday as she was mistakenly given a clear liquid diet. She was able to eat pretzels and tolerate that fine. She has not felt nauseous or vomited. She is passing flatus, and had a few watery stools. She is ambulating. She denies chest pain/shortness of breath. She denies fever/chills. She feels ready to be discharged today.     Objective :  Temp:  [97.8 °F  (36.6 °C)-98.4 °F (36.9 °C)] 98 °F (36.7 °C)  HR:  [84-94] 84  BP: (144-175)/() 144/90  Resp:  [14-18] 18  SpO2:  [95 %-96 %] 96 %  O2 Device: None (Room air)    Physical Exam  Vitals and nursing note reviewed.   Constitutional:       Appearance: Normal appearance.   HENT:      Head: Normocephalic and atraumatic.   Eyes:      Extraocular Movements: Extraocular movements intact.   Cardiovascular:      Rate and Rhythm: Normal rate and regular rhythm.      Pulses: Normal pulses.      Heart sounds: Normal heart sounds.   Pulmonary:      Effort: Pulmonary effort is normal. No respiratory distress.      Breath sounds: Normal breath sounds.   Abdominal:      General: Abdomen is flat. Bowel sounds are normal. There is distension.      Palpations: Abdomen is soft.      Tenderness: There is no abdominal tenderness.      Comments: Mild distension  Mini lap site C/D/I  Port sites C/D/I   Musculoskeletal:      Cervical back: Normal range of motion.   Skin:     General: Skin is warm and dry.      Capillary Refill: Capillary refill takes less than 2 seconds.   Neurological:      General: No focal deficit present.      Mental Status: She is alert. Mental status is at baseline.   Psychiatric:         Mood and Affect: Mood normal.         Behavior: Behavior normal.         Lab Results: I have reviewed the following results:CBC/BMP:   .     02/09/25  0548   WBC 7.50   HGB 12.3   HCT 36.5      SODIUM 137   K 3.5      CO2 24   BUN 5   CREATININE 0.74   GLUC 104   MG 1.7*        Imaging Results Review: No pertinent imaging studies reviewed.  Other Study Results Review: No additional pertinent studies reviewed.    VTE Pharmacologic Prophylaxis: Heparin  VTE Mechanical Prophylaxis: sequential compression device    Juan A Phillips MD  Obstetrics & Gynecology PGY-1  2/9/2025  7:50 AM

## 2025-02-09 NOTE — PLAN OF CARE
Problem: PAIN - ADULT  Goal: Verbalizes/displays adequate comfort level or baseline comfort level  Description: Interventions:  - Encourage patient to monitor pain and request assistance  - Assess pain using appropriate pain scale  - Administer analgesics based on type and severity of pain and evaluate response  - Implement non-pharmacological measures as appropriate and evaluate response  - Consider cultural and social influences on pain and pain management  - Notify physician/advanced practitioner if interventions unsuccessful or patient reports new pain  Outcome: Adequate for Discharge     Problem: INFECTION - ADULT  Goal: Absence or prevention of progression during hospitalization  Description: INTERVENTIONS:  - Assess and monitor for signs and symptoms of infection  - Monitor lab/diagnostic results  - Monitor all insertion sites, i.e. indwelling lines, tubes, and drains  - Monitor endotracheal if appropriate and nasal secretions for changes in amount and color  - [place] appropriate cooling/warming therapies per order  - Administer medications as ordered  - Instruct and encourage patient and family to use good hand hygiene technique  - Identify and instruct in appropriate isolation precautions for identified infection/condition  Outcome: Adequate for Discharge  Goal: Absence of fever/infection during neutropenic period  Description: INTERVENTIONS:  - Monitor WBC    Outcome: Adequate for Discharge     Problem: SAFETY ADULT  Goal: Patient will remain free of falls  Description: INTERVENTIONS:  - Educate patient/family on patient safety including physical limitations  - Instruct patient to call for assistance with activity   - Consult OT/PT to assist with strengthening/mobility   - Keep Call bell within reach  - Keep bed low and locked with side rails adjusted as appropriate  - Keep care items and personal belongings within reach  - Initiate and maintain comfort rounds  - Make Fall Risk Sign visible to  staff  - Offer Toileting every 2 Hours, in advance of need  - Initiate/Maintain bed alarm  - Obtain necessary fall risk management equipment:   - Apply yellow socks and bracelet for high fall risk patients  - Consider moving patient to room near nurses station  Outcome: Adequate for Discharge  Goal: Maintain or return to baseline ADL function  Description: INTERVENTIONS:  -  Assess patient's ability to carry out ADLs; assess patient's baseline for ADL function and identify physical deficits which impact ability to perform ADLs (bathing, care of mouth/teeth, toileting, grooming, dressing, etc.)  - Assess/evaluate cause of self-care deficits   - Assess range of motion  - Assess patient's mobility; develop plan if impaired  - Assess patient's need for assistive devices and provide as appropriate  - Encourage maximum independence but intervene and supervise when necessary  - Involve family in performance of ADLs  - Assess for home care needs following discharge   - Consider OT consult to assist with ADL evaluation and planning for discharge  - Provide patient education as appropriate  Outcome: Adequate for Discharge  Goal: Maintains/Returns to pre admission functional level  Description: INTERVENTIONS:  - Perform AM-PAC 6 Click Basic Mobility/ Daily Activity assessment daily.  - Set and communicate daily mobility goal to care team and patient/family/caregiver.   - Out of bed for toileting  - Record patient progress and toleration of activity level   Outcome: Adequate for Discharge     Problem: DISCHARGE PLANNING  Goal: Discharge to home or other facility with appropriate resources  Description: INTERVENTIONS:  - Identify barriers to discharge w/patient and caregiver  - Arrange for needed discharge resources and transportation as appropriate  - Identify discharge learning needs (meds, wound care, etc.)  - Arrange for interpretive services to assist at discharge as needed  - Refer to Case Management Department for  coordinating discharge planning if the patient needs post-hospital services based on physician/advanced practitioner order or complex needs related to functional status, cognitive ability, or social support system  Outcome: Adequate for Discharge     Problem: Knowledge Deficit  Goal: Patient/family/caregiver demonstrates understanding of disease process, treatment plan, medications, and discharge instructions  Description: Complete learning assessment and assess knowledge base.  Interventions:  - Provide teaching at level of understanding  - Provide teaching via preferred learning methods  Outcome: Adequate for Discharge

## 2025-02-10 VITALS
RESPIRATION RATE: 16 BRPM | SYSTOLIC BLOOD PRESSURE: 129 MMHG | TEMPERATURE: 98.9 F | BODY MASS INDEX: 42.44 KG/M2 | DIASTOLIC BLOOD PRESSURE: 86 MMHG | WEIGHT: 230.6 LBS | HEART RATE: 102 BPM | HEIGHT: 62 IN | OXYGEN SATURATION: 95 %

## 2025-02-10 LAB
ALBUMIN SERPL BCG-MCNC: 3.7 G/DL (ref 3.5–5)
ALP SERPL-CCNC: 57 U/L (ref 34–104)
ALT SERPL W P-5'-P-CCNC: 35 U/L (ref 7–52)
ANION GAP SERPL CALCULATED.3IONS-SCNC: 5 MMOL/L (ref 4–13)
AST SERPL W P-5'-P-CCNC: 49 U/L (ref 13–39)
BILIRUB SERPL-MCNC: 0.39 MG/DL (ref 0.2–1)
BUN SERPL-MCNC: 6 MG/DL (ref 5–25)
CALCIUM SERPL-MCNC: 9.9 MG/DL (ref 8.4–10.2)
CHLORIDE SERPL-SCNC: 106 MMOL/L (ref 96–108)
CO2 SERPL-SCNC: 27 MMOL/L (ref 21–32)
CREAT SERPL-MCNC: 0.84 MG/DL (ref 0.6–1.3)
ERYTHROCYTE [DISTWIDTH] IN BLOOD BY AUTOMATED COUNT: 14.5 % (ref 11.6–15.1)
GFR SERPL CREATININE-BSD FRML MDRD: 78 ML/MIN/1.73SQ M
GLUCOSE SERPL-MCNC: 90 MG/DL (ref 65–140)
HCT VFR BLD AUTO: 39.4 % (ref 34.8–46.1)
HGB BLD-MCNC: 13.2 G/DL (ref 11.5–15.4)
MAGNESIUM SERPL-MCNC: 1.9 MG/DL (ref 1.9–2.7)
MCH RBC QN AUTO: 30.1 PG (ref 26.8–34.3)
MCHC RBC AUTO-ENTMCNC: 33.5 G/DL (ref 31.4–37.4)
MCV RBC AUTO: 90 FL (ref 82–98)
PLATELET # BLD AUTO: 424 THOUSANDS/UL (ref 149–390)
PMV BLD AUTO: 9.4 FL (ref 8.9–12.7)
POTASSIUM SERPL-SCNC: 4.1 MMOL/L (ref 3.5–5.3)
PROT SERPL-MCNC: 6.9 G/DL (ref 6.4–8.4)
RBC # BLD AUTO: 4.38 MILLION/UL (ref 3.81–5.12)
SODIUM SERPL-SCNC: 138 MMOL/L (ref 135–147)
WBC # BLD AUTO: 15.49 THOUSAND/UL (ref 4.31–10.16)

## 2025-02-10 PROCEDURE — 83735 ASSAY OF MAGNESIUM: CPT

## 2025-02-10 PROCEDURE — 85027 COMPLETE CBC AUTOMATED: CPT

## 2025-02-10 PROCEDURE — 80053 COMPREHEN METABOLIC PANEL: CPT

## 2025-02-10 PROCEDURE — 99024 POSTOP FOLLOW-UP VISIT: CPT | Performed by: OBSTETRICS & GYNECOLOGY

## 2025-02-10 PROCEDURE — NC001 PR NO CHARGE: Performed by: OBSTETRICS & GYNECOLOGY

## 2025-02-10 RX ADMIN — SIMETHICONE 80 MG: 80 TABLET, CHEWABLE ORAL at 05:36

## 2025-02-10 RX ADMIN — ACETAMINOPHEN 975 MG: 325 TABLET, FILM COATED ORAL at 05:36

## 2025-02-10 RX ADMIN — DOCUSATE SODIUM 100 MG: 100 CAPSULE, LIQUID FILLED ORAL at 12:38

## 2025-02-10 RX ADMIN — ACETAMINOPHEN 975 MG: 325 TABLET, FILM COATED ORAL at 12:37

## 2025-02-10 RX ADMIN — HEPARIN SODIUM 5000 UNITS: 5000 INJECTION INTRAVENOUS; SUBCUTANEOUS at 05:36

## 2025-02-10 RX ADMIN — HEPARIN SODIUM 5000 UNITS: 5000 INJECTION INTRAVENOUS; SUBCUTANEOUS at 14:27

## 2025-02-10 RX ADMIN — LISINOPRIL 40 MG: 20 TABLET ORAL at 08:44

## 2025-02-10 RX ADMIN — SIMETHICONE 80 MG: 80 TABLET, CHEWABLE ORAL at 12:37

## 2025-02-10 NOTE — PROGRESS NOTES
Progress Note - GYN Oncology   Name: Esther Gonzalez 54 y.o. female I MRN: [id number]  Unit/Bed#: E5 -01 I Date of Admission: 2/5/2025   Date of Service: 2/10/2025 I Hospital Day: 5     Assessment & Plan  Diagnostic  laparoscopy, Bilateral salpingo-oopherectomy, resection of ovarian mass and minilaparotomy  - Originally presented on 1/22 to [place] [facility] for worsening intermittent abdominal pain with associated increasing abdominal girth for a year  - 1/22/2025 CT AP: 17 cm large cystic and solid right adnexal mass, consistent with a primary ovarian serous cystadenocarcinoma. The associated omental nodularity and trace ascites is highly concerning for peritoneal carcinomatosis   - Now POD#5 s/p dx lap, hand assisted right salpingo-oophorectomy, omentectomy, appendectomy, peritoneal biopsy, bilateral pelvic and para-aortic LND & mini laparotomy for ovarian mass  - IOFS significant for poorly differentiated ovarian carcinoma   - 2/10 - elevated WBC from 7.5 > 15.4. Patient afebrile. Will monitor for signs of evolving infection.     Plan:   - Pain: tylenol ANNMARIE, geoffrey PRN, s/p epidural  - FEN: regular, replete electrolytes PRN, zofran annmarie  - : s/p Ware, voiding spontaneously  - DVT ppx: SCD, SQH  - Disposition: anticipate d/c home today if patient feels well and is able to tolerate solids    Essential hypertension  Home meds: lisinopril 40mg qD, and amlodipine 5 mg qD    Plan:   - On home meds  Sickle cell trait (HCC)  Plan:   - F/u daily labs  Adnexal mass  - See plan above under diagnostic laparoscopy, bilateral salpingo-oophorectomy, resection of ovarian mass and minilaparotomy     24 Hour Events : No acute events overnight.   Subjective : Feeling well this morning. Patient reports last night after eating solid foods her abdomen became very distended and she found that concerning. She has been passing gas and had a bowel movement yesterday. Denies SOB, CP.     Objective :  Temp:  [98 °F (36.7 °C)-99.3 °F  (37.4 °C)] 99.3 °F (37.4 °C)  HR:  [] 94  BP: (141-153)/(83-93) 141/83  Resp:  [18] 18  SpO2:  [96 %-98 %] 98 %  O2 Device: None (Room air)    Physical Exam  Vitals and nursing note reviewed.   Constitutional:       Appearance: Normal appearance.   HENT:      Head: Normocephalic and atraumatic.   Eyes:      Extraocular Movements: Extraocular movements intact.   Cardiovascular:      Rate and Rhythm: Normal rate and regular rhythm.      Pulses: Normal pulses.      Heart sounds: Normal heart sounds.   Pulmonary:      Effort: Pulmonary effort is normal. No respiratory distress.      Breath sounds: Normal breath sounds.   Abdominal:      General: Abdomen is flat. Bowel sounds are normal. There is no distension.      Palpations: Abdomen is soft.      Tenderness: There is no abdominal tenderness.      Comments: Mild distension  Mini lap site C/D/I  Port sites C/D/I   Musculoskeletal:      Cervical back: Normal range of motion.   Skin:     General: Skin is warm and dry.      Capillary Refill: Capillary refill takes less than 2 seconds.   Neurological:      General: No focal deficit present.      Mental Status: She is alert. Mental status is at baseline.   Psychiatric:         Mood and Affect: Mood normal.         Behavior: Behavior normal.         Lab Results: I have reviewed the following results:CBC/BMP:   .     02/10/25  0527   WBC 15.49*   HGB 13.2   HCT 39.4   *        Imaging Results Review: No pertinent imaging studies reviewed.  Other Study Results Review: No additional pertinent studies reviewed.    VTE Pharmacologic Prophylaxis: Heparin  VTE Mechanical Prophylaxis: sequential compression device    Judy Souza  PGY-1 OB/GYN  02/10/25  6:52 AM

## 2025-02-10 NOTE — PLAN OF CARE
Problem: PAIN - ADULT  Goal: Verbalizes/displays adequate comfort level or baseline comfort level  Description: Interventions:  - Encourage patient to monitor pain and request assistance  - Assess pain using appropriate pain scale  - Administer analgesics based on type and severity of pain and evaluate response  - Implement non-pharmacological measures as appropriate and evaluate response  - Consider cultural and social influences on pain and pain management  - Notify physician/advanced practitioner if interventions unsuccessful or patient reports new pain  2/10/2025 1759 by Colleen Hughes-Behler, RN  Outcome: Adequate for Discharge  2/10/2025 1023 by Colleen Hughes-Behler, RN  Outcome: Adequate for Discharge     Problem: INFECTION - ADULT  Goal: Absence or prevention of progression during hospitalization  Description: INTERVENTIONS:  - Assess and monitor for signs and symptoms of infection  - Monitor lab/diagnostic results  - Monitor all insertion sites, i.e. indwelling lines, tubes, and drains  - Monitor endotracheal if appropriate and nasal secretions for changes in amount and color  - [place] appropriate cooling/warming therapies per order  - Administer medications as ordered  - Instruct and encourage patient and family to use good hand hygiene technique  - Identify and instruct in appropriate isolation precautions for identified infection/condition  2/10/2025 1759 by Colleen Hughes-Behler, RN  Outcome: Adequate for Discharge  2/10/2025 1023 by Colleen Hughes-Behler, RN  Outcome: Adequate for Discharge  Goal: Absence of fever/infection during neutropenic period  Description: INTERVENTIONS:  - Monitor WBC    2/10/2025 1759 by Colleen Hughes-Behler, RN  Outcome: Adequate for Discharge  2/10/2025 1023 by Colleen Hughes-Behler, RN  Outcome: Adequate for Discharge     Problem: SAFETY ADULT  Goal: Patient will remain free of falls  Description: INTERVENTIONS:  - Educate patient/family on patient safety including  physical limitations  - Instruct patient to call for assistance with activity   - Consult OT/PT to assist with strengthening/mobility   - Keep Call bell within reach  - Keep bed low and locked with side rails adjusted as appropriate  - Keep care items and personal belongings within reach  - Initiate and maintain comfort rounds  - Make Fall Risk Sign visible to staff  - Offer Toileting every 2 Hours, in advance of need  - Initiate/Maintain bed alarm  - Obtain necessary fall risk management equipment:   - Apply yellow socks and bracelet for high fall risk patients  - Consider moving patient to room near nurses station  2/10/2025 1759 by Colleen Hughes-Behler, RN  Outcome: Adequate for Discharge  2/10/2025 1023 by Colleen Hughes-Behler, RN  Outcome: Adequate for Discharge  Goal: Maintain or return to baseline ADL function  Description: INTERVENTIONS:  -  Assess patient's ability to carry out ADLs; assess patient's baseline for ADL function and identify physical deficits which impact ability to perform ADLs (bathing, care of mouth/teeth, toileting, grooming, dressing, etc.)  - Assess/evaluate cause of self-care deficits   - Assess range of motion  - Assess patient's mobility; develop plan if impaired  - Assess patient's need for assistive devices and provide as appropriate  - Encourage maximum independence but intervene and supervise when necessary  - Involve family in performance of ADLs  - Assess for home care needs following discharge   - Consider OT consult to assist with ADL evaluation and planning for discharge  - Provide patient education as appropriate  2/10/2025 1759 by Colleen Hughes-Behler, RN  Outcome: Adequate for Discharge  2/10/2025 1023 by Colleen Hughes-Behler, RN  Outcome: Adequate for Discharge  Goal: Maintains/Returns to pre admission functional level  Description: INTERVENTIONS:  - Perform AM-PAC 6 Click Basic Mobility/ Daily Activity assessment daily.  - Set and communicate daily mobility goal to  care team and patient/family/caregiver.   - Out of bed for toileting  - Record patient progress and toleration of activity level   2/10/2025 1759 by Colleen Hughes-Behler, RN  Outcome: Adequate for Discharge  2/10/2025 1023 by Colleen Hughes-Behler, RN  Outcome: Adequate for Discharge     Problem: DISCHARGE PLANNING  Goal: Discharge to home or other facility with appropriate resources  Description: INTERVENTIONS:  - Identify barriers to discharge w/patient and caregiver  - Arrange for needed discharge resources and transportation as appropriate  - Identify discharge learning needs (meds, wound care, etc.)  - Arrange for interpretive services to assist at discharge as needed  - Refer to Case Management Department for coordinating discharge planning if the patient needs post-hospital services based on physician/advanced practitioner order or complex needs related to functional status, cognitive ability, or social support system  2/10/2025 1759 by Colleen Hughes-Behler, RN  Outcome: Adequate for Discharge  2/10/2025 1023 by Colleen Hughes-Behler, RN  Outcome: Adequate for Discharge     Problem: Knowledge Deficit  Goal: Patient/family/caregiver demonstrates understanding of disease process, treatment plan, medications, and discharge instructions  Description: Complete learning assessment and assess knowledge base.  Interventions:  - Provide teaching at level of understanding  - Provide teaching via preferred learning methods  2/10/2025 1759 by Colleen Hughes-Behler, RN  Outcome: Adequate for Discharge  2/10/2025 1023 by Colleen Hughes-Behler, RN  Outcome: Adequate for Discharge

## 2025-02-10 NOTE — PLAN OF CARE
Problem: PAIN - ADULT  Goal: Verbalizes/displays adequate comfort level or baseline comfort level  Description: Interventions:  - Encourage patient to monitor pain and request assistance  - Assess pain using appropriate pain scale  - Administer analgesics based on type and severity of pain and evaluate response  - Implement non-pharmacological measures as appropriate and evaluate response  - Consider cultural and social influences on pain and pain management  - Notify physician/advanced practitioner if interventions unsuccessful or patient reports new pain  Outcome: Adequate for Discharge     Problem: INFECTION - ADULT  Goal: Absence or prevention of progression during hospitalization  Description: INTERVENTIONS:  - Assess and monitor for signs and symptoms of infection  - Monitor lab/diagnostic results  - Monitor all insertion sites, i.e. indwelling lines, tubes, and drains  - Monitor endotracheal if appropriate and nasal secretions for changes in amount and color  - Stanley appropriate cooling/warming therapies per order  - Administer medications as ordered  - Instruct and encourage patient and family to use good hand hygiene technique  - Identify and instruct in appropriate isolation precautions for identified infection/condition  Outcome: Adequate for Discharge  Goal: Absence of fever/infection during neutropenic period  Description: INTERVENTIONS:  - Monitor WBC    Outcome: Adequate for Discharge     Problem: SAFETY ADULT  Goal: Patient will remain free of falls  Description: INTERVENTIONS:  - Educate patient/family on patient safety including physical limitations  - Instruct patient to call for assistance with activity   - Consult OT/PT to assist with strengthening/mobility   - Keep Call bell within reach  - Keep bed low and locked with side rails adjusted as appropriate  - Keep care items and personal belongings within reach  - Initiate and maintain comfort rounds  - Make Fall Risk Sign visible to  staff  - Offer Toileting every 2 Hours, in advance of need  - Initiate/Maintain bed alarm  - Obtain necessary fall risk management equipment:   - Apply yellow socks and bracelet for high fall risk patients  - Consider moving patient to room near nurses station  Outcome: Adequate for Discharge  Goal: Maintain or return to baseline ADL function  Description: INTERVENTIONS:  -  Assess patient's ability to carry out ADLs; assess patient's baseline for ADL function and identify physical deficits which impact ability to perform ADLs (bathing, care of mouth/teeth, toileting, grooming, dressing, etc.)  - Assess/evaluate cause of self-care deficits   - Assess range of motion  - Assess patient's mobility; develop plan if impaired  - Assess patient's need for assistive devices and provide as appropriate  - Encourage maximum independence but intervene and supervise when necessary  - Involve family in performance of ADLs  - Assess for home care needs following discharge   - Consider OT consult to assist with ADL evaluation and planning for discharge  - Provide patient education as appropriate  Outcome: Adequate for Discharge  Goal: Maintains/Returns to pre admission functional level  Description: INTERVENTIONS:  - Perform AM-PAC 6 Click Basic Mobility/ Daily Activity assessment daily.  - Set and communicate daily mobility goal to care team and patient/family/caregiver.   - Out of bed for toileting  - Record patient progress and toleration of activity level   Outcome: Adequate for Discharge     Problem: DISCHARGE PLANNING  Goal: Discharge to home or other facility with appropriate resources  Description: INTERVENTIONS:  - Identify barriers to discharge w/patient and caregiver  - Arrange for needed discharge resources and transportation as appropriate  - Identify discharge learning needs (meds, wound care, etc.)  - Arrange for interpretive services to assist at discharge as needed  - Refer to Case Management Department for  coordinating discharge planning if the patient needs post-hospital services based on physician/advanced practitioner order or complex needs related to functional status, cognitive ability, or social support system  Outcome: Adequate for Discharge     Problem: Knowledge Deficit  Goal: Patient/family/caregiver demonstrates understanding of disease process, treatment plan, medications, and discharge instructions  Description: Complete learning assessment and assess knowledge base.  Interventions:  - Provide teaching at level of understanding  - Provide teaching via preferred learning methods  Outcome: Adequate for Discharge

## 2025-02-10 NOTE — ASSESSMENT & PLAN NOTE
- Originally presented on 1/22 to [facility] for worsening intermittent abdominal pain with associated increasing abdominal girth for a year  - 1/22/2025 CT AP: 17 cm large cystic and solid right adnexal mass, consistent with a primary ovarian serous cystadenocarcinoma. The associated omental nodularity and trace ascites is highly concerning for peritoneal carcinomatosis   - Now POD#5 s/p dx lap, hand assisted right salpingo-oophorectomy, omentectomy, appendectomy, peritoneal biopsy, bilateral pelvic and para-aortic LND & mini laparotomy for ovarian mass  - IOFS significant for poorly differentiated ovarian carcinoma   - 2/10 - elevated WBC from 7.5 > 15.4. Patient afebrile. Will monitor for signs of evolving infection.     Plan:   - Pain: tylenol CHASITY, geoffrey PRN, s/p epidural  - FEN: regular, replete electrolytes PRN, zofran chasity  - : s/p Ware, voiding spontaneously  - DVT ppx: SCD, SQH  - Disposition: anticipate d/c home today if patient feels well and is able to tolerate solids

## 2025-02-10 NOTE — PROGRESS NOTES
Re-evaluated patient at 1600. She reports she has no abdominal pain. She has been tolerating PO, and had two bowel movements today. She is ambulating and voiding freely. She reports she feels the midline vertical incision oozing. On exam, no tenderness to palpation of abdomen. Midline vertical incision with peeling Exofin, with small sanguinous fluid oozing. Otherwise intact, clean, non-erythematous, not warm or indurated. Steri strips and Telfa placed over incision. Stable for discharge with outpatient follow up    Dr. Valderrama aware    Sydnee Faulkner MD  PGY-4  2/10/2025  5:14 PM

## 2025-02-10 NOTE — PLAN OF CARE
Problem: PAIN - ADULT  Goal: Verbalizes/displays adequate comfort level or baseline comfort level  Description: Interventions:  - Encourage patient to monitor pain and request assistance  - Assess pain using appropriate pain scale  - Administer analgesics based on type and severity of pain and evaluate response  - Implement non-pharmacological measures as appropriate and evaluate response  - Consider cultural and social influences on pain and pain management  - Notify physician/advanced practitioner if interventions unsuccessful or patient reports new pain  Outcome: Progressing     Problem: INFECTION - ADULT  Goal: Absence or prevention of progression during hospitalization  Description: INTERVENTIONS:  - Assess and monitor for signs and symptoms of infection  - Monitor lab/diagnostic results  - Monitor all insertion sites, i.e. indwelling lines, tubes, and drains  - Monitor endotracheal if appropriate and nasal secretions for changes in amount and color  - [place] appropriate cooling/warming therapies per order  - Administer medications as ordered  - Instruct and encourage patient and family to use good hand hygiene technique  - Identify and instruct in appropriate isolation precautions for identified infection/condition  Outcome: Progressing  Goal: Absence of fever/infection during neutropenic period  Description: INTERVENTIONS:  - Monitor WBC    Outcome: Progressing     Problem: SAFETY ADULT  Goal: Patient will remain free of falls  Description: INTERVENTIONS:  - Educate patient/family on patient safety including physical limitations  - Instruct patient to call for assistance with activity   - Consult OT/PT to assist with strengthening/mobility   - Keep Call bell within reach  - Keep bed low and locked with side rails adjusted as appropriate  - Keep care items and personal belongings within reach  - Initiate and maintain comfort rounds  - Make Fall Risk Sign visible to staff  - Offer Toileting every 2 Hours, in  advance of need  - Initiate/Maintain bed alarm  - Obtain necessary fall risk management equipment:   - Apply yellow socks and bracelet for high fall risk patients  - Consider moving patient to room near nurses station  Outcome: Progressing  Goal: Maintain or return to baseline ADL function  Description: INTERVENTIONS:  -  Assess patient's ability to carry out ADLs; assess patient's baseline for ADL function and identify physical deficits which impact ability to perform ADLs (bathing, care of mouth/teeth, toileting, grooming, dressing, etc.)  - Assess/evaluate cause of self-care deficits   - Assess range of motion  - Assess patient's mobility; develop plan if impaired  - Assess patient's need for assistive devices and provide as appropriate  - Encourage maximum independence but intervene and supervise when necessary  - Involve family in performance of ADLs  - Assess for home care needs following discharge   - Consider OT consult to assist with ADL evaluation and planning for discharge  - Provide patient education as appropriate  Outcome: Progressing  Goal: Maintains/Returns to pre admission functional level  Description: INTERVENTIONS:  - Perform AM-PAC 6 Click Basic Mobility/ Daily Activity assessment daily.  - Set and communicate daily mobility goal to care team and patient/family/caregiver.   - Out of bed for toileting  - Record patient progress and toleration of activity level   Outcome: Progressing     Problem: DISCHARGE PLANNING  Goal: Discharge to home or other facility with appropriate resources  Description: INTERVENTIONS:  - Identify barriers to discharge w/patient and caregiver  - Arrange for needed discharge resources and transportation as appropriate  - Identify discharge learning needs (meds, wound care, etc.)  - Arrange for interpretive services to assist at discharge as needed  - Refer to Case Management Department for coordinating discharge planning if the patient needs post-hospital services based  on physician/advanced practitioner order or complex needs related to functional status, cognitive ability, or social support system  Outcome: Progressing     Problem: Knowledge Deficit  Goal: Patient/family/caregiver demonstrates understanding of disease process, treatment plan, medications, and discharge instructions  Description: Complete learning assessment and assess knowledge base.  Interventions:  - Provide teaching at level of understanding  - Provide teaching via preferred learning methods  Outcome: Progressing

## 2025-02-11 NOTE — UTILIZATION REVIEW
NOTIFICATION OF ADMISSION DISCHARGE   This is a Notification of Discharge from [facility]. Please be advised that this patient has been discharge from our facility. Below you will find the admission and discharge date and time including the patient’s disposition.   UTILIZATION REVIEW CONTACT:  Zoë Strauss  Utilization   Network Utilization Review Department  Phone: [phone] x carefully listen to the prompts. All voicemails are confidential.  Email: [email or web address]     ADMISSION INFORMATION  PRESENTATION DATE: 2/5/2025  7:43 AM  OBERVATION ADMISSION DATE: N/A  INPATIENT ADMISSION DATE: 2/5/25  2:14 PM   DISCHARGE DATE: 2/10/2025  7:00 PM   DISPOSITION:Home/Self Care    Network Utilization Review Department  ATTENTION: Please call with any questions or concerns to [phone] and carefully listen to the prompts so that you are directed to the right person. All voicemails are confidential.   For Discharge needs, contact Care Management DC Support Team at [phone] opt. 2  Send all requests for admission clinical reviews, approved or denied determinations and any other requests to dedicated fax number below belonging to the campus where the patient is receiving treatment. List of dedicated fax numbers for the Facilities:  FACILITY NAME UR FAX NUMBER   ADMISSION DENIALS (Administrative/Medical Necessity) [phone]   DISCHARGE SUPPORT TEAM ([facility]) [phone]   PARENT CHILD HEALTH (Maternity/NICU/Pediatrics) [phone]   [facility] [phone]   [facility] [phone]   [facility] [phone]   [facility] [phone]   [facility] [phone]   [facility] [phone]   [facility] [phone]   [facility] [phone]    [facility] [phone]   [facility] [phone]   [facility] [phone]   [facility] [phone]

## 2025-02-13 PROCEDURE — 88305 TISSUE EXAM BY PATHOLOGIST: CPT | Performed by: STUDENT IN AN ORGANIZED HEALTH CARE EDUCATION/TRAINING PROGRAM

## 2025-02-13 PROCEDURE — 88307 TISSUE EXAM BY PATHOLOGIST: CPT | Performed by: STUDENT IN AN ORGANIZED HEALTH CARE EDUCATION/TRAINING PROGRAM

## 2025-02-13 PROCEDURE — 88342 IMHCHEM/IMCYTCHM 1ST ANTB: CPT | Performed by: STUDENT IN AN ORGANIZED HEALTH CARE EDUCATION/TRAINING PROGRAM

## 2025-02-13 PROCEDURE — 88341 IMHCHEM/IMCYTCHM EA ADD ANTB: CPT | Performed by: STUDENT IN AN ORGANIZED HEALTH CARE EDUCATION/TRAINING PROGRAM

## 2025-02-13 PROCEDURE — 88302 TISSUE EXAM BY PATHOLOGIST: CPT | Performed by: STUDENT IN AN ORGANIZED HEALTH CARE EDUCATION/TRAINING PROGRAM

## 2025-02-14 ENCOUNTER — DOCUMENTATION (OUTPATIENT)
Dept: HEMATOLOGY ONCOLOGY | Facility: CLINIC | Age: 55
End: 2025-02-14

## 2025-02-14 NOTE — PROGRESS NOTES
In-basket message received from Dr. Valderrama to add patient to the gyn MDCC on 2/24/2025. Chart reviewed and prep completed.

## 2025-02-24 ENCOUNTER — DOCUMENTATION (OUTPATIENT)
Dept: GYNECOLOGIC ONCOLOGY | Facility: CLINIC | Age: 55
End: 2025-02-24

## 2025-02-24 NOTE — PROGRESS NOTES
Multidisciplinary Gynecologic Oncology Tumor Case Review       Physician Recommended Plan     Esther Gonzalez is a 54 y.o. 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 The group recommended observation.     Follow-up appointment with IZ on 2/25/25.     NCCN guidelines were available for review.     The final treatment plan will be left to the discretion of the patient and the treating physician.       DISCLAIMERS:    TO THE TREATING PHYSICIAN:  This conference is a meeting of clinicians from various specialty areas who evaluate and discuss patients for whom a multidisciplinary treatment approach is being considered. Please note that the above opinion was a consensus of the conference attendees and is intended only to assist in quality care of the discussed patient.  The responsibility for follow up on the input given during the conference, along with any final decisions regarding plan of care, is that of the patient and the patient's provider. Accordingly, appointments have only been recommended based on this information and have NOT been scheduled unless otherwise noted.      TO THE PATIENT:  This summary is a brief record of major aspects of your cancer treatment. You may choose to share a copy with any of your doctors or nurses. However, this is not a detailed or comprehensive record of your care.

## 2025-02-25 ENCOUNTER — OFFICE VISIT (OUTPATIENT)
Dept: GYNECOLOGIC ONCOLOGY | Facility: CLINIC | Age: 55
End: 2025-02-25
Payer: COMMERCIAL

## 2025-02-25 VITALS
HEIGHT: 62 IN | WEIGHT: 197 LBS | RESPIRATION RATE: 16 BRPM | OXYGEN SATURATION: 100 % | TEMPERATURE: 98.2 F | SYSTOLIC BLOOD PRESSURE: 132 MMHG | BODY MASS INDEX: 36.25 KG/M2 | DIASTOLIC BLOOD PRESSURE: 88 MMHG | HEART RATE: 92 BPM

## 2025-02-25 DIAGNOSIS — C56.1 MALIGNANT NEOPLASM OF RIGHT OVARY (HCC): Primary | ICD-10-CM

## 2025-02-25 PROBLEM — Z98.890 HX OF LAPAROSCOPY: Status: RESOLVED | Noted: 2025-02-05 | Resolved: 2025-02-25

## 2025-02-25 PROCEDURE — 99213 OFFICE O/P EST LOW 20 MIN: CPT | Performed by: OBSTETRICS & GYNECOLOGY

## 2025-02-25 NOTE — PROGRESS NOTES
Name: Esther Gonzalez      : 1970      MRN: [id number]  Encounter Provider: Eusebio Valderrama MD  Encounter Date: 2025   Encounter department: [facility] [place]  :  Assessment & Plan  Malignant neoplasm of right ovary (HCC)  I have independently obtained history from patient today.    Patient has recovered well after surgery.  Incisions healing well.  Some superficial excoriation requires topical care for which I provided instructions to keep clean and dry, avoid applying paper towels which stick to this area, etc.    Final pathology results discussed with patient.  Recommendation from tumor board for observation discussed.  All questions answered.  Patient will return to the office in 3 months with previsit .  She will contact us if she has any questions or concerns.    Orders:  •  ; Future            History of Present Illness   Reason for Visit / CC: Follow-up.  Recommendations for management of newly diagnosed ovarian cancer.      Esther Gonzalez is a 54 y.o. female   Patient presents for postoperative follow-up.  On  underwent surgical intervention for complex pelvic mass.  Final pathology demonstrated stage Ia squamous cell carcinoma arising in mature cystic teratoma.  Patient has recovered uneventfully.  Reports normal bowel and bladder function.  Pain is well-controlled.  Incisions are well-healed.  Reports little blister in the subabdominal fold with popped recently.  She has been applying paper towel to this area.  No evidence of drainage.  No pain.  No other concerns.  Reports normal appetite.      Pertinent Medical History     Obesity, hypertension.       Oncology History   Cancer Staging   Malignant neoplasm of right ovary (HCC)  Staging form: Ovary, Fallopian Tube, Primary Peritoneal, AJCC 8th Edition  - Pathologic stage from 2025: FIGO Stage IA (pT1a, pN0, cM0) - Signed by Eusebio Valderrama MD on 2025  Histopathologic  "type: Squamous cell carcinoma, NOS  Stage prefix: Initial diagnosis  Residual tumor (R): R0 - None  Oncology History   Malignant neoplasm of right ovary (HCC)   2/5/2025 -  Cancer Staged    Staging form: Ovary, Fallopian Tube, Primary Peritoneal, AJCC 8th Edition  - Pathologic stage from 2/5/2024: FIGO Stage IA (pT1a, pN0, cM0) - Signed by Eusebio Valderrama MD on 2/25/2025  Histopathologic type: Squamous cell carcinoma, NOS  Stage prefix: Initial diagnosis  Residual tumor (R): R0 - None         2/5/2025 Surgery    Procedure(s):  DIAGNOSTIC LAPAROSCOPY, LAPAROSCOPIC HAND ASSISTED RIGHT SALPINGO-OOPHORECTOMY, INDICATED APPENDECTOMY, INFRACOLIC OMENTECTOMY,  MINI LAPAROTOMY WITH ADHESIOLYSIS, LEFT OOPHORECTOMY, STAGING PERITONEAL BIOPSIES AND BILATERAL PELVIC AND PARAAORTIC LYMPHADENECTOMY    Case discussed at multidisciplinary tumor conference on February 24 2025.  Consensus recommendation for observation.    Case was discussed at multidisciplinary tumor conference on February 24, 2025.  Recommended against adjuvant therapy and for observation.  Final pathology demonstrated 17.2 cm right ovarian mass with squamous cell carcinoma arising on mature cystic teratoma.  No other malignant elements identified.  Intact capsule.  No surface involvement.  Atypical washings.  Staging including appendectomy, omentectomy, pelvic peritoneal biopsies, pelvic (0/11) and periaortic (0/5) lymph nodes all negative for metastatic disease.        Review of Systems A complete review of systems is negative other than that noted above in the HPI.       Objective   /88 (BP Location: Left arm, Patient Position: Sitting, Cuff Size: Large)   Pulse 92   Temp 98.2 °F (36.8 °C) (Temporal)   Resp 16   Ht 5' 2\" (1.575 m)   Wt 89.4 kg (197 lb)   LMP 04/11/2021   SpO2 100%   BMI 36.03 kg/m²     Body mass index is 36.03 kg/m².  Pain Screening:  Pain Score: 0-No pain  ECOG ECOG Performance Status: 1 - Restricted in physically strenuous " activity but ambulatory and able to carry out work of a light or sedentary nature, e.g., light house work, office work   Physical Exam   Abdomen: Incisions well-healed.  No hernias.  Approximately 2 cm excoriated skin above subabdominal fold with applied paper towel.  No signs of infection.  No drainage.     Trend:  Lab Results   Component Value Date     182.5 (H) 01/22/2025     Final Diagnosis   A. Right ovary, right oophorectomy:  - Well to moderately differentiated squamous cell carcinoma, arising in the background of mature cystic teratoma (17.2 cm). Please see template.  - Rare fragments of epithelium, most compatible with fimbria are identified.  No definite fallopian tube is identified.      B. Omentum, omentectomy:  - Fibroadipose tissue with mixed acute and chronic inflammation and reactive changes.      C. Appendix, appendectomy:  - Appendix with fibrous obliteration.       D. Peritoneum, right pelvic side wall lesion, biopsy:  - Benign fibrovascular tissue.       E. Right pelvic peritoneum, biopsy:  - Fibroadipose tissue with mixed acute and chronic inflammation and reactive changes.      F. Right periaortic lymph nodes, excision:  - Four lymph nodes, negative for metastatic carcinoma (0/4).       G. Pelvic lymph nodes, regional resection:  - Eleven lymph nodes, negative for metastatic carcinoma (0/11).       H. Left ovary, left oophorectomy:  - Ovary with prior biopsy/surgical site changes.       I. Left periaortic lymph nodes, excision:  - One lymph node, negative for metastatic carcinoma (0/1).      Electronically signed by Catarino Watsno DO on 2/13/2025 at 1328 EST   Note  BE 77 LAB   Immunohistochemical stains performed show that lesional cells are positive for CK7, p63, p40 and GATA3 and negative for WT1, p53, p16, ER, SC, pax8, CDX2, CK20 and uroplakin. GFAP and S100 highlights neural element.      Best block for further testing: A2, A5-A7 and A10.            Administrative Statements    I have spent a total time of 20 minutes in caring for this patient on the day of the visit/encounter including Diagnostic results, Prognosis, Risks and benefits of tx options, Instructions for management, and Impressions.    Eusebio Valderrama MD, AUGUSTUS, FACOG, FACS  2/25/2025  3:19 PM

## 2025-02-25 NOTE — LETTER
2025     Lamar Heaton DO  [address] [facility] [address]    Patient: Esther Gonzalez   YOB: 1970   Date of Visit: 2025       Dear Dr. Heaton:    Thank you for referring Esther Gonzalez to me for evaluation. Below are my notes for this consultation.    If you have questions, please do not hesitate to call me. I look forward to following your patient along with you.         Sincerely,        Eusebio Valderrama MD        CC: DO Eusebio Ornelas MD  2025  3:19 PM  Incomplete  Name: Esther Gonzalez      : 1970      MRN: [id number]  Encounter Provider: Eusebio Valderrama MD  Encounter Date: 2025   Encounter department: [facility] [place]  :  Assessment & Plan  Malignant neoplasm of right ovary (HCC)  I have independently obtained history from patient today.    Patient has recovered well after surgery.  Incisions healing well.  Some superficial excoriation requires topical care for which I provided instructions to keep clean and dry, avoid applying paper towels which stick to this area, etc.    Final pathology results discussed with patient.  Recommendation from tumor board for observation discussed.  All questions answered.  Patient will return to the office in 3 months with previsit .  She will contact us if she has any questions or concerns.      Orders:  •  ; Future            History of Present Illness  Reason for Visit / CC: Follow-up.  Recommendations for management of newly diagnosed ovarian cancer.      Esther Gonzalez is a 54 y.o. female   Patient presents for postoperative follow-up.  On  underwent surgical intervention for complex pelvic mass.  Final pathology demonstrated stage Ia squamous cell carcinoma arising in mature cystic teratoma.  Patient has recovered uneventfully.  Reports normal bowel and bladder function.  Pain is well-controlled.  Incisions are  well-healed.  Reports little blister in the subabdominal fold with popped recently.  She has been applying paper towel to this area.  No evidence of drainage.  No pain.  No other concerns.  Reports normal appetite.      Pertinent Medical History    Obesity, hypertension.         Oncology History  Cancer Staging   Malignant neoplasm of right ovary (HCC)  Staging form: Ovary, Fallopian Tube, Primary Peritoneal, AJCC 8th Edition  - Pathologic stage from 2/5/2025: FIGO Stage IA (pT1a, pN0, cM0) - Signed by Eusebio Valderrama MD on 2/25/2025  Histopathologic type: Squamous cell carcinoma, NOS  Stage prefix: Initial diagnosis  Residual tumor (R): R0 - None  Oncology History   Malignant neoplasm of right ovary (HCC)   2/5/2025 -  Cancer Staged    Staging form: Ovary, Fallopian Tube, Primary Peritoneal, AJCC 8th Edition  - Pathologic stage from 2/5/2024: FIGO Stage IA (pT1a, pN0, cM0) - Signed by Eusebio Valderrama MD on 2/25/2025  Histopathologic type: Squamous cell carcinoma, NOS  Stage prefix: Initial diagnosis  Residual tumor (R): R0 - None         2/5/2025 Surgery    Procedure(s):  DIAGNOSTIC LAPAROSCOPY, LAPAROSCOPIC HAND ASSISTED RIGHT SALPINGO-OOPHORECTOMY, INDICATED APPENDECTOMY, INFRACOLIC OMENTECTOMY,  MINI LAPAROTOMY WITH ADHESIOLYSIS, LEFT OOPHORECTOMY, STAGING PERITONEAL BIOPSIES AND BILATERAL PELVIC AND PARAAORTIC LYMPHADENECTOMY    Case discussed at multidisciplinary tumor conference on February 24 2025.  Consensus recommendation for observation.    Case was discussed at multidisciplinary tumor conference on February 24, 2025.  Recommended against adjuvant therapy and for observation.  Final pathology demonstrated 17.2 cm right ovarian mass with squamous cell carcinoma arising on mature cystic teratoma.  No other malignant elements identified.  Intact capsule.  No surface involvement.  Atypical washings.  Staging including appendectomy, omentectomy, pelvic peritoneal biopsies, pelvic (0/11) and periaortic  "(0/5) lymph nodes all negative for metastatic disease.        Review of Systems A complete review of systems is negative other than that noted above in the HPI.       Objective  /88 (BP Location: Left arm, Patient Position: Sitting, Cuff Size: Large)   Pulse 92   Temp 98.2 °F (36.8 °C) (Temporal)   Resp 16   Ht 5' 2\" (1.575 m)   Wt 89.4 kg (197 lb)   LMP 04/11/2021   SpO2 100%   BMI 36.03 kg/m²     Body mass index is 36.03 kg/m².  Pain Screening:  Pain Score: 0-No pain  ECOG ECOG Performance Status: 1 - Restricted in physically strenuous activity but ambulatory and able to carry out work of a light or sedentary nature, e.g., light house work, office work   Physical Exam   Abdomen: Incisions well-healed.  No hernias.  Approximately 2 cm excoriated skin above subabdominal fold with applied paper towel.  No signs of infection.  No drainage.     Trend:  Lab Results   Component Value Date     182.5 (H) 01/22/2025     Final Diagnosis   A. Right ovary, right oophorectomy:  - Well to moderately differentiated squamous cell carcinoma, arising in the background of mature cystic teratoma (17.2 cm). Please see template.  - Rare fragments of epithelium, most compatible with fimbria are identified.  No definite fallopian tube is identified.      B. Omentum, omentectomy:  - Fibroadipose tissue with mixed acute and chronic inflammation and reactive changes.      C. Appendix, appendectomy:  - Appendix with fibrous obliteration.       D. Peritoneum, right pelvic side wall lesion, biopsy:  - Benign fibrovascular tissue.       E. Right pelvic peritoneum, biopsy:  - Fibroadipose tissue with mixed acute and chronic inflammation and reactive changes.      F. Right periaortic lymph nodes, excision:  - Four lymph nodes, negative for metastatic carcinoma (0/4).       G. Pelvic lymph nodes, regional resection:  - Eleven lymph nodes, negative for metastatic carcinoma (0/11).       H. Left ovary, left oophorectomy:  - " Ovary with prior biopsy/surgical site changes.       I. Left periaortic lymph nodes, excision:  - One lymph node, negative for metastatic carcinoma (0/1).      Electronically signed by Catarino Watson DO on 2/13/2025 at 1328 EST   Note  BE 77 LAB   Immunohistochemical stains performed show that lesional cells are positive for CK7, p63, p40 and GATA3 and negative for WT1, p53, p16, ER, MD, pax8, CDX2, CK20 and uroplakin. GFAP and S100 highlights neural element.      Best block for further testing: A2, A5-A7 and A10.            Administrative Statements  I have spent a total time of 20 minutes in caring for this patient on the day of the visit/encounter including Diagnostic results, Prognosis, Risks and benefits of tx options, Instructions for management, and Impressions.    Eusebio Valderrama MD, [place]USTUS, FACOG, FACS  2/25/2025  3:18 PM

## 2025-02-25 NOTE — ASSESSMENT & PLAN NOTE
I have independently obtained history from patient today.    Patient has recovered well after surgery.  Incisions healing well.  Some superficial excoriation requires topical care for which I provided instructions to keep clean and dry, avoid applying paper towels which stick to this area, etc.    Final pathology results discussed with patient.  Recommendation from tumor board for observation discussed.  All questions answered.  Patient will return to the office in 3 months with previsit .  She will contact us if she has any questions or concerns.    Orders:  •  ; Future

## 2025-02-26 ENCOUNTER — PATIENT OUTREACH (OUTPATIENT)
Dept: HEMATOLOGY ONCOLOGY | Facility: CLINIC | Age: 55
End: 2025-02-26

## 2025-02-26 NOTE — PROGRESS NOTES
I reached out to Esther  to complete the Distress Thermometer, review for any barriers to care and to offer any supportive services that may be needed. I left VM with the reason for my call including my direct phone number .

## 2025-02-27 ENCOUNTER — TELEPHONE (OUTPATIENT)
Age: 55
End: 2025-02-27

## 2025-02-27 ENCOUNTER — PATIENT OUTREACH (OUTPATIENT)
Dept: HEMATOLOGY ONCOLOGY | Facility: CLINIC | Age: 55
End: 2025-02-27

## 2025-02-27 NOTE — TELEPHONE ENCOUNTER
Call received by Esther.     Patient had surgery on 2/5/25 and post-op visit on 2/25/25. Per patient  stated she can return back to work on 3/28. Patient stated she receive [facility] paperwork and the return date on paperwork is 3/5/25 and that's too soon. Patient will like [facility] paperwork to be fixed and re-faxed. Per patient she was also told she wasn't able to lift anything for that month and a half.     Please call patient.       Thanks!

## 2025-02-27 NOTE — PROGRESS NOTES
Dominick Barrera, this is Tulio. Distance till I am returning your call Please give me a call back at [phone]. Sorry, I'm giving you your number back [phone]. Calling is [phone]. Thanks.    Returned pts call, no answer, left a detailed message with my contact information

## 2025-03-03 ENCOUNTER — PATIENT OUTREACH (OUTPATIENT)
Dept: HEMATOLOGY ONCOLOGY | Facility: CLINIC | Age: 55
End: 2025-03-03

## 2025-03-27 ENCOUNTER — TELEPHONE (OUTPATIENT)
Age: 55
End: 2025-03-27

## 2025-03-27 NOTE — TELEPHONE ENCOUNTER
Patient calling in to report had surgery with Dr. Valderrama on 2/5 and will be returning to work at [employer] tomorrow 3/28/25.  She reports needs a letter from Dr. Valderrama confirming she can return to work with no restrictions faxed to [employer].  She thanked me.     Fax Attention to Kyleigh Eastman at [phone]

## 2025-04-10 ENCOUNTER — TELEPHONE (OUTPATIENT)
Dept: ADMINISTRATIVE | Facility: OTHER | Age: 55
End: 2025-04-10

## 2025-04-10 NOTE — TELEPHONE ENCOUNTER
----- Message from Joana KWONG sent at 4/10/2025  8:08 AM EDT -----  Regarding: hepc, hiv  04/10/25 8:09 AM    Hello, our patient Esther Gonzalez has had Hepatitis C and HIV completed/performed. Please assist in updating the patient chart by pulling the Care Everywhere (CE) document. The date of service is 2024.     Thank you,  Joana Covarrubias, RN  [facility]  PRIMARY C[facility]

## 2025-04-11 ENCOUNTER — OFFICE VISIT (OUTPATIENT)
Dept: FAMILY MEDICINE CLINIC | Facility: CLINIC | Age: 55
End: 2025-04-11
Payer: COMMERCIAL

## 2025-04-11 ENCOUNTER — HOSPITAL ENCOUNTER (OUTPATIENT)
Dept: RADIOLOGY | Facility: HOSPITAL | Age: 55
End: 2025-04-11
Payer: COMMERCIAL

## 2025-04-11 VITALS
SYSTOLIC BLOOD PRESSURE: 128 MMHG | BODY MASS INDEX: 34.66 KG/M2 | DIASTOLIC BLOOD PRESSURE: 74 MMHG | TEMPERATURE: 96.8 F | HEART RATE: 89 BPM | WEIGHT: 195.6 LBS | HEIGHT: 63 IN | OXYGEN SATURATION: 100 % | RESPIRATION RATE: 17 BRPM

## 2025-04-11 DIAGNOSIS — M79.671 RIGHT FOOT PAIN: ICD-10-CM

## 2025-04-11 DIAGNOSIS — E55.9 VITAMIN D DEFICIENCY: ICD-10-CM

## 2025-04-11 DIAGNOSIS — I10 ESSENTIAL HYPERTENSION: ICD-10-CM

## 2025-04-11 DIAGNOSIS — Z12.31 SCREENING MAMMOGRAM FOR BREAST CANCER: ICD-10-CM

## 2025-04-11 DIAGNOSIS — R93.2 ABNORMAL FINDING ON IMAGING OF LIVER: ICD-10-CM

## 2025-04-11 DIAGNOSIS — M54.50 CHRONIC BILATERAL LOW BACK PAIN WITHOUT SCIATICA: ICD-10-CM

## 2025-04-11 DIAGNOSIS — M25.561 CHRONIC PAIN OF RIGHT KNEE: ICD-10-CM

## 2025-04-11 DIAGNOSIS — G89.29 CHRONIC BILATERAL LOW BACK PAIN WITHOUT SCIATICA: ICD-10-CM

## 2025-04-11 DIAGNOSIS — C56.1 MALIGNANT NEOPLASM OF RIGHT OVARY (HCC): ICD-10-CM

## 2025-04-11 DIAGNOSIS — Z76.89 ENCOUNTER TO ESTABLISH CARE: Primary | ICD-10-CM

## 2025-04-11 DIAGNOSIS — G89.29 CHRONIC PAIN OF RIGHT KNEE: ICD-10-CM

## 2025-04-11 DIAGNOSIS — E66.9 OBESITY (BMI 35.0-39.9 WITHOUT COMORBIDITY): ICD-10-CM

## 2025-04-11 DIAGNOSIS — Z00.00 ANNUAL PHYSICAL EXAM: ICD-10-CM

## 2025-04-11 DIAGNOSIS — Z13.6 SCREENING FOR CARDIOVASCULAR CONDITION: ICD-10-CM

## 2025-04-11 PROBLEM — E83.52 HYPERCALCEMIA: Status: ACTIVE | Noted: 2024-03-26

## 2025-04-11 PROBLEM — E83.52 HYPERCALCEMIA: Status: RESOLVED | Noted: 2024-03-26 | Resolved: 2025-04-11

## 2025-04-11 PROBLEM — H25.9 AGE-RELATED CATARACT OF BOTH EYES: Status: ACTIVE | Noted: 2023-06-13

## 2025-04-11 PROCEDURE — 73630 X-RAY EXAM OF FOOT: CPT

## 2025-04-11 PROCEDURE — 99386 PREV VISIT NEW AGE 40-64: CPT | Performed by: PHYSICIAN ASSISTANT

## 2025-04-11 PROCEDURE — 99204 OFFICE O/P NEW MOD 45 MIN: CPT | Performed by: PHYSICIAN ASSISTANT

## 2025-04-11 RX ORDER — AMLODIPINE BESYLATE 5 MG/1
5 TABLET ORAL DAILY
Qty: 90 TABLET | Refills: 1 | Status: SHIPPED | OUTPATIENT
Start: 2025-04-11

## 2025-04-11 RX ORDER — LISINOPRIL 40 MG/1
40 TABLET ORAL DAILY
Qty: 90 TABLET | Refills: 1 | Status: SHIPPED | OUTPATIENT
Start: 2025-04-11

## 2025-04-11 NOTE — ASSESSMENT & PLAN NOTE
BP Readings from Last 3 Encounters:   04/11/25 128/74   02/25/25 132/88   02/10/25 129/86     Stable and controlled on lisinopril 40 mg and amlodipine 5 mg.  Orders:    amLODIPine (NORVASC) 5 mg tablet; Take 1 tablet (5 mg total) by mouth daily    lisinopril (ZESTRIL) 40 mg tablet; Take 1 tablet (40 mg total) by mouth daily

## 2025-04-11 NOTE — PROGRESS NOTES
Adult Annual Physical  Name: Esther Gonzalez      : 1970      MRN: [id number]  Encounter Provider: Madelin Torres PA-C  Encounter Date: 2025   Encounter department: [facility] CARE [facility]    :  Assessment & Plan  Encounter to establish care  Switch primary care providers due to preferring female provider       Annual physical exam         Essential hypertension  BP Readings from Last 3 Encounters:   25 128/74   25 132/88   02/10/25 129/86     Stable and controlled on lisinopril 40 mg and amlodipine 5 mg.  Orders:    amLODIPine (NORVASC) 5 mg tablet; Take 1 tablet (5 mg total) by mouth daily    lisinopril (ZESTRIL) 40 mg tablet; Take 1 tablet (40 mg total) by mouth daily    Malignant neoplasm of right ovary (HCC)  S/p bilateral salpingo-oophorectomy, appendectomy, oophorectomy, and lymph node dissection revealing stage Ia right ovarian cancer.  Reviewed gynecology oncology consult note from 2025, plan for repeat .       Chronic pain of right knee  Suspect due to OA.  Continue topical diclofenac.    Reviewed last x-ray of R knee from 2022 with following:  There are moderate to severe osteoarthritic degenerative changes of the patellofemoral joint.  Mild osteoarthritic degenerative changes of the medial and lateral patellofemoral compartments including joint space narrowing and marginal osteophyte   formation.       Right foot pain  Ongoing right dorsal aspect of foot pain, worse with certain movements.  No new injury or inciting event.  Check x-ray right foot.  Consider referral to podiatry.  Pending x-ray will determine next steps, note written for restrictions for indoor work for 2 weeks due to worsening pain with walking and driving.  Orders:    XR foot 3+ vw right; Future    Abnormal finding on imaging of liver  Incidental finding of 7 mm nonspecific hypodensity per CT abdomen and pelvis in 2025.  Check right upper quadrant ultrasound.  Orders:     US right upper quadrant; Future    Vitamin D deficiency  Component  Ref Range & Units 6/13/23  1:25 PM   Vitamin D, 25-OH  30 - 100 ng/mL 26 Low      Repeat vitamin D level.  Orders:    Vitamin D 25 hydroxy; Future    Obesity (BMI 35.0-39.9 without comorbidity)  Wt Readings from Last 3 Encounters:   04/11/25 88.7 kg (195 lb 9.6 oz)   02/25/25 89.4 kg (197 lb)   02/05/25 105 kg (230 lb 9.6 oz)     Lost weight after surgery, encouraged well-balanced diet, exercise.  Continued weight loss.       Chronic bilateral low back pain without sciatica  Recommend continue Tylenol as needed, back stretching, core strengthening and weight loss.       Screening mammogram for breast cancer    Orders:    Mammo screening bilateral w 3d and cad; Future    Screening for cardiovascular condition    Orders:    Lipid panel; Future    Comprehensive metabolic panel; Future    CBC and differential; Future        Preventive Screenings:  - Diabetes Screening: screening up-to-date  - Cholesterol Screening: orders placed   - Hepatitis C screening: screening up-to-date   - HIV screening: screening up-to-date   - Cervical cancer screening: screening up-to-date   - Breast cancer screening: orders placed   - Colon cancer screening: screening up-to-date   - Lung cancer screening: screening not indicated     Immunizations:  - Immunizations due: Prevnar 20 and Zoster (Shingrix)  - The patient declines recommended vaccines currently despite my recommendations      Counseling/Anticipatory Guidance:  - Alcohol: discussed moderation in alcohol intake and recommendations for healthy alcohol use.   - Drug use: discussed harms of illicit drug use and how it can negatively impact mental/physical health.   - Tobacco use: discussed harms of tobacco use and management options for quitting.   - Dental health: discussed importance of regular tooth brushing, flossing, and dental visits.   - Diet: discussed recommendations for a healthy/well-balanced diet.   -  Exercise: the importance of regular exercise/physical activity was discussed. Recommend exercise 3-5 times per week for at least 30 minutes.   - Injury prevention: discussed safety/seat belts, safety helmets, smoke detectors, carbon monoxide detectors, and smoking near bedding or upholstery.          History of Present Illness     Adult Annual Physical:  Patient presents for annual physical. Esther is a 54 y.o. female with a h/o ovarian CA s/p recent laparotomy, HTN who presents as a new patient to establish care.  Preferred female provider.  Originally from [facility].  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  Typically due to walking on streets and driving, right side is affecting her and not able to work.  Taking Tylenol as needed..     Diet and Physical Activity:  - Diet/Nutrition: no special diet.  - Exercise: no formal exercise and walking.    Depression Screening:  - PHQ-2 Score: 0    General Health:  - Sleep: sleeps well.  - Hearing: normal hearing bilateral ears.  - Vision: no vision problems.  - Dental: no dental visits for > 1 year.    /GYN Health:  - Follows with GYN: yes.   - Menopause: postmenopausal.   - History of STDs: no  - Contraception: hysterectomy.      Advanced Care Planning:  - Has an advanced directive?: no    - Has a durable medical POA?: no    - ACP document given to patient?: no      Review of Systems   Constitutional:  Negative for chills and fever.   HENT:  Negative for ear pain and sore throat.    Eyes:  Negative for pain and visual disturbance.   Respiratory:  Negative for cough and shortness of breath.    Cardiovascular:  Negative for chest pain and palpitations.   Gastrointestinal:  Negative for abdominal pain and vomiting.   Genitourinary:  Negative for dysuria and hematuria.   Musculoskeletal:  Positive for arthralgias (Right knee and right foot). Negative for back pain.   Skin:  Negative for color change  "and rash.   Neurological:  Negative for seizures and syncope.   All other systems reviewed and are negative.        Objective   /74 (BP Location: Left arm, Patient Position: Sitting, Cuff Size: Standard)   Pulse 89   Temp (!) 96.8 °F (36 °C) (Tympanic)   Resp 17   Ht 5' 2.5\" (1.588 m)   Wt 88.7 kg (195 lb 9.6 oz)   LMP 04/11/2021   SpO2 100%   BMI 35.21 kg/m²     Physical Exam  Vitals and nursing note reviewed.   Constitutional:       General: She is not in acute distress.     Appearance: She is well-developed. She is obese.   HENT:      Head: Normocephalic and atraumatic.   Eyes:      Conjunctiva/sclera: Conjunctivae normal.   Cardiovascular:      Rate and Rhythm: Normal rate and regular rhythm.      Pulses:           Dorsalis pedis pulses are 2+ on the right side and 2+ on the left side.        Posterior tibial pulses are 2+ on the right side and 2+ on the left side.      Heart sounds: No murmur heard.  Pulmonary:      Effort: Pulmonary effort is normal. No respiratory distress.      Breath sounds: Normal breath sounds.   Abdominal:      Palpations: Abdomen is soft.      Tenderness: There is no abdominal tenderness.   Musculoskeletal:         General: No swelling.      Cervical back: Neck supple.      Right knee: Crepitus present. Normal range of motion.      Left knee: Normal range of motion.      Right foot: Normal range of motion.      Left foot: Normal range of motion.   Feet:      Right foot:      Skin integrity: Skin integrity normal.      Left foot:      Skin integrity: Skin integrity normal.   Skin:     General: Skin is warm and dry.      Capillary Refill: Capillary refill takes less than 2 seconds.   Neurological:      Mental Status: She is alert.   Psychiatric:         Mood and Affect: Mood normal.         "

## 2025-04-11 NOTE — ASSESSMENT & PLAN NOTE
Wt Readings from Last 3 Encounters:   04/11/25 88.7 kg (195 lb 9.6 oz)   02/25/25 89.4 kg (197 lb)   02/05/25 105 kg (230 lb 9.6 oz)     Lost weight after surgery, encouraged well-balanced diet, exercise.  Continued weight loss.

## 2025-04-11 NOTE — LETTER
April 11, 2025     Patient: Esther Gonzalez  YOB: 1970  Date of Visit: 4/11/2025      To Whom it May Concern:    Esther Gonzalez is under my professional care. Esther was seen in my office on 4/11/2025. Esther may return to work on Monday 4/14/25 . Please excuse her from work from 4/9/25 to 4/11/25 with restrictions for driving for 2 weeks, please accommodate for in house work for 2 weeks.     If you have any questions or concerns, please don't hesitate to call.         Sincerely,          Madelin Torres PA-C        CC: No Recipients

## 2025-04-11 NOTE — TELEPHONE ENCOUNTER
Upon review of the In Basket request we were able to locate, review, and update the patient chart as requested for Hepatitis C  and HIV.    Any additional questions or concerns should be emailed to the Practice Liaisons via the appropriate education email address, please do not reply via In Basket.    Thank you  Kirti Wang   [facility]

## 2025-04-11 NOTE — ASSESSMENT & PLAN NOTE
S/p bilateral salpingo-oophorectomy, appendectomy, oophorectomy, and lymph node dissection revealing stage Ia right ovarian cancer.  Reviewed gynecology oncology consult note from 2/2025, plan for repeat .

## 2025-04-15 ENCOUNTER — RESULTS FOLLOW-UP (OUTPATIENT)
Dept: FAMILY MEDICINE CLINIC | Facility: CLINIC | Age: 55
End: 2025-04-15

## 2025-04-15 DIAGNOSIS — R79.89 HIGH SERUM CALCIUM: Primary | ICD-10-CM

## 2025-04-15 DIAGNOSIS — M79.671 RIGHT FOOT PAIN: ICD-10-CM

## 2025-04-18 ENCOUNTER — HOSPITAL ENCOUNTER (OUTPATIENT)
Dept: ULTRASOUND IMAGING | Facility: HOSPITAL | Age: 55
Discharge: HOME | End: 2025-04-18
Attending: PHYSICIAN ASSISTANT
Payer: COMMERCIAL

## 2025-04-18 ENCOUNTER — APPOINTMENT (OUTPATIENT)
Dept: LAB | Facility: HOSPITAL | Age: 55
End: 2025-04-18
Payer: COMMERCIAL

## 2025-04-18 DIAGNOSIS — C56.1 MALIGNANT NEOPLASM OF RIGHT OVARY (HCC): ICD-10-CM

## 2025-04-18 DIAGNOSIS — E55.9 VITAMIN D DEFICIENCY: ICD-10-CM

## 2025-04-18 DIAGNOSIS — Z13.6 SCREENING FOR CARDIOVASCULAR CONDITION: ICD-10-CM

## 2025-04-18 DIAGNOSIS — R93.2 ABNORMAL FINDING ON IMAGING OF LIVER: ICD-10-CM

## 2025-04-18 LAB
25(OH)D3 SERPL-MCNC: 27.6 NG/ML (ref 30–100)
ALBUMIN SERPL BCG-MCNC: 4.2 G/DL (ref 3.5–5)
ALP SERPL-CCNC: 69 U/L (ref 34–104)
ALT SERPL W P-5'-P-CCNC: 14 U/L (ref 7–52)
ANION GAP SERPL CALCULATED.3IONS-SCNC: 5 MMOL/L (ref 4–13)
AST SERPL W P-5'-P-CCNC: 17 U/L (ref 13–39)
BASOPHILS # BLD AUTO: 0.02 THOUSANDS/ÂΜL (ref 0–0.1)
BASOPHILS NFR BLD AUTO: 0 % (ref 0–1)
BILIRUB SERPL-MCNC: 0.41 MG/DL (ref 0.2–1)
BUN SERPL-MCNC: 13 MG/DL (ref 5–25)
CALCIUM SERPL-MCNC: 10.6 MG/DL (ref 8.4–10.2)
CHLORIDE SERPL-SCNC: 104 MMOL/L (ref 96–108)
CHOLEST SERPL-MCNC: 224 MG/DL (ref ?–200)
CO2 SERPL-SCNC: 30 MMOL/L (ref 21–32)
CREAT SERPL-MCNC: 0.84 MG/DL (ref 0.6–1.3)
EOSINOPHIL # BLD AUTO: 0.2 THOUSAND/ÂΜL (ref 0–0.61)
EOSINOPHIL NFR BLD AUTO: 3 % (ref 0–6)
ERYTHROCYTE [DISTWIDTH] IN BLOOD BY AUTOMATED COUNT: 14.9 % (ref 11.6–15.1)
GFR SERPL CREATININE-BSD FRML MDRD: 78 ML/MIN/1.73SQ M
GLUCOSE P FAST SERPL-MCNC: 90 MG/DL (ref 65–99)
HCT VFR BLD AUTO: 42.2 % (ref 34.8–46.1)
HDLC SERPL-MCNC: 54 MG/DL
HGB BLD-MCNC: 14 G/DL (ref 11.5–15.4)
IMM GRANULOCYTES # BLD AUTO: 0.01 THOUSAND/UL (ref 0–0.2)
IMM GRANULOCYTES NFR BLD AUTO: 0 % (ref 0–2)
LDLC SERPL CALC-MCNC: 154 MG/DL (ref 0–100)
LYMPHOCYTES # BLD AUTO: 2.88 THOUSANDS/ÂΜL (ref 0.6–4.47)
LYMPHOCYTES NFR BLD AUTO: 47 % (ref 14–44)
MCH RBC QN AUTO: 30 PG (ref 26.8–34.3)
MCHC RBC AUTO-ENTMCNC: 33.2 G/DL (ref 31.4–37.4)
MCV RBC AUTO: 90 FL (ref 82–98)
MONOCYTES # BLD AUTO: 0.45 THOUSAND/ÂΜL (ref 0.17–1.22)
MONOCYTES NFR BLD AUTO: 7 % (ref 4–12)
NEUTROPHILS # BLD AUTO: 2.7 THOUSANDS/ÂΜL (ref 1.85–7.62)
NEUTS SEG NFR BLD AUTO: 43 % (ref 43–75)
NONHDLC SERPL-MCNC: 170 MG/DL
NRBC BLD AUTO-RTO: 0 /100 WBCS
PLATELET # BLD AUTO: 347 THOUSANDS/UL (ref 149–390)
PMV BLD AUTO: 10.3 FL (ref 8.9–12.7)
POTASSIUM SERPL-SCNC: 4.5 MMOL/L (ref 3.5–5.3)
PROT SERPL-MCNC: 8 G/DL (ref 6.4–8.4)
RBC # BLD AUTO: 4.67 MILLION/UL (ref 3.81–5.12)
SODIUM SERPL-SCNC: 139 MMOL/L (ref 135–147)
TRIGL SERPL-MCNC: 79 MG/DL (ref ?–150)
WBC # BLD AUTO: 6.26 THOUSAND/UL (ref 4.31–10.16)

## 2025-04-18 PROCEDURE — 80061 LIPID PANEL: CPT

## 2025-04-18 PROCEDURE — 36415 COLL VENOUS BLD VENIPUNCTURE: CPT

## 2025-04-18 PROCEDURE — 76705 ECHO EXAM OF ABDOMEN: CPT

## 2025-04-18 PROCEDURE — 82306 VITAMIN D 25 HYDROXY: CPT

## 2025-04-18 PROCEDURE — 85025 COMPLETE CBC W/AUTO DIFF WBC: CPT

## 2025-04-18 PROCEDURE — 80053 COMPREHEN METABOLIC PANEL: CPT

## 2025-04-24 ENCOUNTER — TELEPHONE (OUTPATIENT)
Age: 55
End: 2025-04-24

## 2025-04-24 NOTE — TELEPHONE ENCOUNTER
Patient called the office regarding her US results. Patient would like a call back to further discuss if possible. Please review and advise.

## 2025-04-24 NOTE — TELEPHONE ENCOUNTER
Patient returning call - called PCP office provider gone for the day, advising Patient to call back tomorrow. Patient agreeable

## 2025-04-25 DIAGNOSIS — K76.9 LESION OF LIVER: Primary | ICD-10-CM

## 2025-04-25 DIAGNOSIS — R93.2 ABNORMAL FINDING ON IMAGING OF LIVER: ICD-10-CM

## 2025-04-28 ENCOUNTER — RA CDI HCC (OUTPATIENT)
Dept: OTHER | Facility: HOSPITAL | Age: 55
End: 2025-04-28

## 2025-04-29 ENCOUNTER — APPOINTMENT (OUTPATIENT)
Dept: LAB | Facility: HOSPITAL | Age: 55
End: 2025-04-29
Payer: COMMERCIAL

## 2025-04-29 ENCOUNTER — OFFICE VISIT (OUTPATIENT)
Dept: FAMILY MEDICINE CLINIC | Facility: CLINIC | Age: 55
End: 2025-04-29
Payer: COMMERCIAL

## 2025-04-29 VITALS
HEART RATE: 88 BPM | BODY MASS INDEX: 36.29 KG/M2 | OXYGEN SATURATION: 100 % | TEMPERATURE: 97.5 F | DIASTOLIC BLOOD PRESSURE: 82 MMHG | HEIGHT: 62 IN | RESPIRATION RATE: 16 BRPM | WEIGHT: 197.2 LBS | SYSTOLIC BLOOD PRESSURE: 144 MMHG

## 2025-04-29 DIAGNOSIS — M54.50 CHRONIC BILATERAL LOW BACK PAIN WITHOUT SCIATICA: ICD-10-CM

## 2025-04-29 DIAGNOSIS — E83.52 HYPERCALCEMIA: ICD-10-CM

## 2025-04-29 DIAGNOSIS — R93.2 ABNORMAL FINDING ON IMAGING OF LIVER: ICD-10-CM

## 2025-04-29 DIAGNOSIS — G89.29 CHRONIC BILATERAL LOW BACK PAIN WITHOUT SCIATICA: ICD-10-CM

## 2025-04-29 DIAGNOSIS — I10 ESSENTIAL HYPERTENSION: ICD-10-CM

## 2025-04-29 DIAGNOSIS — M62.838 TRAPEZIUS MUSCLE SPASM: Primary | ICD-10-CM

## 2025-04-29 DIAGNOSIS — R25.2 LEG CRAMPING: ICD-10-CM

## 2025-04-29 DIAGNOSIS — R79.89 HIGH SERUM CALCIUM: ICD-10-CM

## 2025-04-29 DIAGNOSIS — E66.9 OBESITY (BMI 35.0-39.9 WITHOUT COMORBIDITY): ICD-10-CM

## 2025-04-29 DIAGNOSIS — C56.1 MALIGNANT NEOPLASM OF RIGHT OVARY (HCC): ICD-10-CM

## 2025-04-29 LAB
CA-I BLD-SCNC: 1.26 MMOL/L (ref 1.12–1.32)
CANCER AG125 SERPL-ACNC: 5.4 U/ML (ref 0–35)
MAGNESIUM SERPL-MCNC: 2 MG/DL (ref 1.9–2.7)
PTH-INTACT SERPL-MCNC: 69.3 PG/ML (ref 12–88)

## 2025-04-29 PROCEDURE — 86304 IMMUNOASSAY TUMOR CA 125: CPT

## 2025-04-29 PROCEDURE — 99214 OFFICE O/P EST MOD 30 MIN: CPT | Performed by: PHYSICIAN ASSISTANT

## 2025-04-29 PROCEDURE — 82330 ASSAY OF CALCIUM: CPT

## 2025-04-29 PROCEDURE — 36415 COLL VENOUS BLD VENIPUNCTURE: CPT

## 2025-04-29 PROCEDURE — 83735 ASSAY OF MAGNESIUM: CPT

## 2025-04-29 PROCEDURE — 83970 ASSAY OF PARATHORMONE: CPT

## 2025-04-29 RX ORDER — METHOCARBAMOL 500 MG/1
500 TABLET, FILM COATED ORAL 3 TIMES DAILY PRN
Qty: 30 TABLET | Refills: 0 | Status: SHIPPED | OUTPATIENT
Start: 2025-04-29

## 2025-04-29 NOTE — ASSESSMENT & PLAN NOTE
S/p bilateral salpingo-oophorectomy, appendectomy, oophorectomy, and lymph node dissection revealing stage Ia right ovarian cancer.  Reviewed gynecology oncology consult note from 2/2025, plan for repeat  due prior to appointment scheduled with gyn/onc on 5/27/25.

## 2025-04-29 NOTE — ASSESSMENT & PLAN NOTE
Wt Readings from Last 3 Encounters:   04/29/25 89.4 kg (197 lb 3.2 oz)   04/11/25 88.7 kg (195 lb 9.6 oz)   02/25/25 89.4 kg (197 lb)     30 lb weight loss after surgery for ovarian cancer, has been maintaining stable weight since then but motivated to lose weight. Encouraged well-balanced diet and exercise.

## 2025-04-29 NOTE — ASSESSMENT & PLAN NOTE
Recent US liver showing mild fatty liver, recommend weight loss. Previous 7mm nonspecific finding on CT done in 1/2025, has MRI scheduled as no findings were discussed on US liver to correlate with previous CT finding due to history of ovarian cancer. Follow-up pending MRI scheduled for 5/28/25.

## 2025-04-29 NOTE — ASSESSMENT & PLAN NOTE
Chronic ongoing, no radicular symptoms, weakness or numbness. Start muscle relaxer as needed and recommend core strengthening and stretching exercises. Follow-up if no improvement and consider PT.   Orders:  •  methocarbamol (ROBAXIN) 500 mg tablet; Take 1 tablet (500 mg total) by mouth 3 (three) times a day as needed for muscle spasms May cause drowsiness.

## 2025-04-29 NOTE — ASSESSMENT & PLAN NOTE
BP Readings from Last 3 Encounters:   04/29/25 144/82   04/11/25 128/74   02/25/25 132/88     Walked to appointment today, elevated on exam, recommend checking home BP. Plan to increase dose amlodipine to 5mg BID if still >140s systolic. Has been on lisinopril and amlodipine for several years. Previously on HCTZ but stopped due to urinary frequency, will avoid HCTZ due to hypercalcemia with last labs. Follow-up pending home monitor of BP. Recommend low salt diet and exercise.

## 2025-04-29 NOTE — PROGRESS NOTES
Name: Esther Gonzalez      : 1970      MRN: [id number]  Encounter Provider: Madelin Torres PA-C  Encounter Date: 2025   Encounter department: [facility] PRIMARY CARE [facility]  :  Assessment & Plan  Trapezius muscle spasm  B/l upper muscular spasms, carrying her toddler aged grandchild and was having difficulty due to pain, recommend stretching/exercises. Start Robaxin 500mg TID as needed, caution against driving and drowsiness with medication. Consider PT if no improvement.   Orders:  •  methocarbamol (ROBAXIN) 500 mg tablet; Take 1 tablet (500 mg total) by mouth 3 (three) times a day as needed for muscle spasms May cause drowsiness.    Leg cramping  Recent worsening in the past 2 weeks. Not taking anything currently, b/l thighs radiating down legs. Recommend stretching and repeat labs.   Orders:  •  Magnesium; Future  •  methocarbamol (ROBAXIN) 500 mg tablet; Take 1 tablet (500 mg total) by mouth 3 (three) times a day as needed for muscle spasms May cause drowsiness.    Chronic bilateral low back pain without sciatica  Chronic ongoing, no radicular symptoms, weakness or numbness. Start muscle relaxer as needed and recommend core strengthening and stretching exercises. Follow-up if no improvement and consider PT.   Orders:  •  methocarbamol (ROBAXIN) 500 mg tablet; Take 1 tablet (500 mg total) by mouth 3 (three) times a day as needed for muscle spasms May cause drowsiness.    Essential hypertension  BP Readings from Last 3 Encounters:   25 144/82   25 128/74   25 132/88     Walked to appointment today, elevated on exam, recommend checking home BP. Plan to increase dose amlodipine to 5mg BID if still >140s systolic. Has been on lisinopril and amlodipine for several years. Previously on HCTZ but stopped due to urinary frequency, will avoid HCTZ due to hypercalcemia with last labs. Follow-up pending home monitor of BP. Recommend low salt diet and exercise.         Hypercalcemia  Lab Results   Component Value Date     09/11/2015    SODIUM 139 04/18/2025    K 4.5 04/18/2025     04/18/2025    CO2 30 04/18/2025    ANIONGAP 9 09/11/2015    AGAP 5 04/18/2025    BUN 13 04/18/2025    CREATININE 0.84 04/18/2025    GLUC 90 02/10/2025    GLUF 90 04/18/2025    CALCIUM 10.6 (H) 04/18/2025    AST 17 04/18/2025    ALT 14 04/18/2025    ALKPHOS 69 04/18/2025    PROT 7.4 09/11/2015    TP 8.0 04/18/2025    BILITOT 0.28 09/11/2015    TBILI 0.41 04/18/2025    EGFR 78 04/18/2025     Repeat lab with PTH level. She has since decreased calcium in diet.        Abnormal finding on imaging of liver  Recent US liver showing mild fatty liver, recommend weight loss. Previous 7mm nonspecific finding on CT done in 1/2025, has MRI scheduled as no findings were discussed on US liver to correlate with previous CT finding due to history of ovarian cancer. Follow-up pending MRI scheduled for 5/28/25.       Obesity (BMI 35.0-39.9 without comorbidity)  Wt Readings from Last 3 Encounters:   04/29/25 89.4 kg (197 lb 3.2 oz)   04/11/25 88.7 kg (195 lb 9.6 oz)   02/25/25 89.4 kg (197 lb)     30 lb weight loss after surgery for ovarian cancer, has been maintaining stable weight since then but motivated to lose weight. Encouraged well-balanced diet and exercise.        Malignant neoplasm of right ovary (HCC)  S/p bilateral salpingo-oophorectomy, appendectomy, oophorectomy, and lymph node dissection revealing stage Ia right ovarian cancer.  Reviewed gynecology oncology consult note from 2/2025, plan for repeat  due prior to appointment scheduled with gyn/onc on 5/27/25.               History of Present Illness   Esther is a 54 y.o. female with a h/o ovarian CA s/p recent laparotomy, HTN who presents for follow-up for ultrasound results and cramping/spasm in upper back and legs.  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 Has tried tylenol for pain but not taking anything currently. Some stressors. No inciting injury or event.         Review of Systems   Constitutional:  Negative for chills, fever and unexpected weight change.   Respiratory:  Negative for shortness of breath.    Cardiovascular:  Negative for chest pain.   Musculoskeletal:  Positive for back pain and myalgias. Negative for neck pain and neck stiffness.       Objective   /82 (BP Location: Left arm, Patient Position: Sitting, Cuff Size: Large)   Pulse 88   Temp 97.5 °F (36.4 °C) (Temporal)   Resp 16   Ht 5' 2\" (1.575 m)   Wt 89.4 kg (197 lb 3.2 oz)   LMP 04/11/2021   SpO2 100%   BMI 36.07 kg/m²      Physical Exam  Vitals reviewed.   Constitutional:       Appearance: Normal appearance.   HENT:      Head: Normocephalic and atraumatic.   Cardiovascular:      Rate and Rhythm: Normal rate and regular rhythm.   Pulmonary:      Effort: Pulmonary effort is normal.      Breath sounds: Normal breath sounds.   Musculoskeletal:      Right shoulder: No tenderness. Normal strength.      Left shoulder: No tenderness. Normal range of motion.      Thoracic back: Spasms and tenderness present. Normal range of motion.      Lumbar back: Normal. Negative right straight leg raise test and negative left straight leg raise test.   Neurological:      Mental Status: She is alert and oriented to person, place, and time. Mental status is at baseline.         "

## 2025-04-29 NOTE — LETTER
April 30, 2025     Patient: Esther Gonzalez  YOB: 1970  Date of Visit: 4/29/2025      To Whom it May Concern:    Esther Gonzalez is under my professional care. Esther was seen in my office on 4/29/2025. Esther may return to work on 4/30/25 without restrictions . She is able to drive for work without restrictions.     If you have any questions or concerns, please don't hesitate to call.         Sincerely,          Madelin Torres PA-C        CC: No Recipients

## 2025-04-30 ENCOUNTER — TELEPHONE (OUTPATIENT)
Age: 55
End: 2025-04-30

## 2025-04-30 NOTE — TELEPHONE ENCOUNTER
Patient was seen yesterday for a visit. Left fmla forms to complete, please fax once completed to [phone] Attn: Kyleigh Eastman.    Also asking for an updated letter to be released to drive again for work as previous note in chart had 2 week restriction. Please contact patient back with an update, thank you.

## 2025-05-01 ENCOUNTER — RESULTS FOLLOW-UP (OUTPATIENT)
Dept: FAMILY MEDICINE CLINIC | Facility: CLINIC | Age: 55
End: 2025-05-01

## 2025-05-06 ENCOUNTER — OFFICE VISIT (OUTPATIENT)
Dept: PODIATRY | Facility: CLINIC | Age: 55
End: 2025-05-06
Attending: PHYSICIAN ASSISTANT
Payer: COMMERCIAL

## 2025-05-06 VITALS — HEIGHT: 62 IN | BODY MASS INDEX: 36.25 KG/M2 | WEIGHT: 197 LBS

## 2025-05-06 DIAGNOSIS — M21.962 DEFORMITY OF BOTH FEET: ICD-10-CM

## 2025-05-06 DIAGNOSIS — M21.41 PES PLANUS OF BOTH FEET: Primary | ICD-10-CM

## 2025-05-06 DIAGNOSIS — M21.42 PES PLANUS OF BOTH FEET: Primary | ICD-10-CM

## 2025-05-06 DIAGNOSIS — M21.961 DEFORMITY OF BOTH FEET: ICD-10-CM

## 2025-05-06 DIAGNOSIS — M79.671 RIGHT FOOT PAIN: ICD-10-CM

## 2025-05-06 PROCEDURE — 99203 OFFICE O/P NEW LOW 30 MIN: CPT | Performed by: PODIATRIST

## 2025-05-06 NOTE — PROGRESS NOTES
"Name: Esther Gonzalez      : 1970      MRN: [id number]  Encounter Provider: Giovanny Hernandez DPM  Encounter Date: 2025   Encounter department: [facility] PODIATRY [place]  :  Assessment & Plan  Pes planus of both feet  sEMI-RIGID PES PLANUS COLLAPSE.   XR reviewed with patient, see my report  Conservative care initially involves custom orthotics, weight loss. Home PT  RTC 3 months after you get your orthotics.     We briefly discussed the surgery for this deformity would involves fusion of the TN, STJ. The recovery from this type of procedure is extensive and we and both optimistic we can reduce her pain with non-surgical care.   Orders:  •  Ambulatory referral to Physical Therapy; Future    Deformity of both feet  Orthotics, weight loss, home PT, see above  Orders:  •  Ambulatory referral to Physical Therapy; Future    Right foot pain    Orders:  •  Ambulatory Referral to Podiatry  •  Ambulatory referral to Physical Therapy; Future    XRay 3 views of the right foot personally read by Dr. Hernandez in office today and discussed with patient:    There is no acute fracture or dislocation.  No significant degenerative changes.  No lytic or blastic osseous lesion.  Soft tissues are unremarkable.  Calc inclination angle is flat  Talar declination angle 18 degrees  AP mearys angle 34 degrees      History of Present Illness   HPI  Esther Gonzalez is a 54 y.o. female who presents some pain on her right foot. IT seems more swollen. It began a few months ago, no trauma. It's been going on for years and she just dealt with it.       Review of Systems  As stated in HPI, otherwise normal    Medical History Reviewed by provider this encounter:  Tobacco  Allergies  Meds  Problems  Med Hx  Surg Hx  Fam Hx           Objective   Ht 5' 2\" (1.575 m)   Wt 89.4 kg (197 lb)   LMP 2021   BMI 36.03 kg/m²      Physical Exam  Vitals reviewed.   Cardiovascular:      Rate and Rhythm: Normal rate.    "   Pulses: Normal pulses.   Pulmonary:      Effort: Pulmonary effort is normal. No respiratory distress.   Musculoskeletal:         General: Deformity (collapsing pes plano valgus. No PTT pain. Severe calcaneal valgus on stance. Subluxed TN joint noted.) present.   Skin:     General: Skin is warm.      Findings: No erythema or rash.   Neurological:      Mental Status: She is alert.      Sensory: No sensory deficit.

## 2025-05-27 ENCOUNTER — OFFICE VISIT (OUTPATIENT)
Dept: GYNECOLOGIC ONCOLOGY | Facility: CLINIC | Age: 55
End: 2025-05-27
Payer: COMMERCIAL

## 2025-05-27 VITALS
DIASTOLIC BLOOD PRESSURE: 78 MMHG | OXYGEN SATURATION: 98 % | TEMPERATURE: 97.5 F | BODY MASS INDEX: 35.33 KG/M2 | HEIGHT: 62 IN | SYSTOLIC BLOOD PRESSURE: 120 MMHG | WEIGHT: 192 LBS | RESPIRATION RATE: 18 BRPM | HEART RATE: 97 BPM

## 2025-05-27 DIAGNOSIS — C56.1 MALIGNANT NEOPLASM OF RIGHT OVARY (HCC): Primary | ICD-10-CM

## 2025-05-27 PROCEDURE — 99459 PELVIC EXAMINATION: CPT | Performed by: OBSTETRICS & GYNECOLOGY

## 2025-05-27 PROCEDURE — 99213 OFFICE O/P EST LOW 20 MIN: CPT | Performed by: OBSTETRICS & GYNECOLOGY

## 2025-05-27 NOTE — ASSESSMENT & PLAN NOTE
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 History and physical examination without evidence of diease and normal ca-25. We reviewed s/s that would be concerning for recurrent and a reason to contact to be seen sooner than planned.    -Return to clinic in 3 months  -Ca 125 at that time

## 2025-05-27 NOTE — PROGRESS NOTES
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 History and physical examination without evidence of diease and normal ca-25. We reviewed s/s that would be concerning for recurrent and a reason to contact to be seen sooner than planned.    -Return to clinic in 3 months  -Ca 125 at that time           History of Present Illness   Reason for Visit / CC: ovarian cancer surveillance  Esther Gonzlaez is a 54 y.o. female   Ms Gonzalez is a 55yo with a hx of stage IA squamous cell carcinoma of the ovary s/p USO w/staging who presents today for surveillance.  She reports that overall she is feeling well.  She denies any abdominal pain, bleeding, changes in appetite, No change bowel/bladder habits. No nausea or vomiting.              Oncology History   Cancer Staging   Malignant neoplasm of right ovary (HCC)  Staging form: Ovary, Fallopian Tube, Primary Peritoneal, AJCC 8th Edition  - Pathologic stage from 2025: FIGO Stage IA (pT1a, pN0, cM0) - Signed by Eusebio Valderrama MD on 2025  Histopathologic type: Squamous cell carcinoma, NOS  Stage prefix: Initial diagnosis  Residual tumor (R): R0  Oncology History   Malignant neoplasm of right ovary (HCC)   2025 -  Cancer Staged    Staging form: Ovary, Fallopian Tube, Primary Peritoneal, AJCC 8th Edition  - Pathologic stage from 2024: FIGO Stage IA (pT1a, pN0, cM0) - Signed by Eusebio Valderrama MD on 2025  Histopathologic type: Squamous cell carcinoma, NOS  Stage prefix: Initial diagnosis  Residual tumor (R): R0 - None         2025 Surgery    Procedure(s):  DIAGNOSTIC LAPAROSCOPY, LAPAROSCOPIC HAND ASSISTED RIGHT SALPINGO-OOPHORECTOMY, INDICATED  "APPENDECTOMY, INFRACOLIC OMENTECTOMY,  MINI LAPAROTOMY WITH ADHESIOLYSIS, LEFT OOPHORECTOMY, STAGING PERITONEAL BIOPSIES AND BILATERAL PELVIC AND PARAAORTIC LYMPHADENECTOMY    Case discussed at multidisciplinary tumor conference on February 24 2025.  Consensus recommendation for observation.    Case was discussed at multidisciplinary tumor conference on February 24, 2025.  Recommended against adjuvant therapy and for observation.  Final pathology demonstrated 17.2 cm right ovarian mass with squamous cell carcinoma arising on mature cystic teratoma.  No other malignant elements identified.  Intact capsule.  No surface involvement.  Atypical washings.  Staging including appendectomy, omentectomy, pelvic peritoneal biopsies, pelvic (0/11) and periaortic (0/5) lymph nodes all negative for metastatic disease.        Review of Systems   Constitutional:  Negative for chills and fever.   HENT:  Negative for ear pain and sore throat.    Eyes:  Negative for pain and visual disturbance.   Respiratory:  Negative for cough and shortness of breath.    Cardiovascular:  Negative for chest pain and palpitations.   Gastrointestinal:  Negative for abdominal pain and vomiting.   Genitourinary:  Negative for dysuria and hematuria.   Musculoskeletal:  Negative for arthralgias and back pain.   Skin:  Negative for color change and rash.   Neurological:  Negative for seizures and syncope.   All other systems reviewed and are negative.   A complete review of systems is negative other than that noted above in the HPI.  Medical History Reviewed by provider this encounter:     .     Objective   /78 (BP Location: Left arm, Patient Position: Sitting, Cuff Size: Adult)   Pulse 97   Temp 97.5 °F (36.4 °C)   Resp 18   Ht 5' 2\" (1.575 m)   Wt 87.1 kg (192 lb)   LMP 04/11/2021   SpO2 98%   BMI 35.12 kg/m²     Body mass index is 35.12 kg/m².  Pain Screening:  Pain Score: 0-No pain  ECOG   0  Physical Exam  Vitals and nursing note " reviewed.   Constitutional:       Appearance: Normal appearance.   HENT:      Head: Normocephalic and atraumatic.     Cardiovascular:      Rate and Rhythm: Normal rate and regular rhythm.   Pulmonary:      Effort: Pulmonary effort is normal. No respiratory distress.   Abdominal:      General: There is no distension.      Palpations: Abdomen is soft. There is no mass.      Tenderness: There is no abdominal tenderness. There is no guarding or rebound.   Genitourinary:     Comments: Normal vulva, vagina, and cervix. No adnexal fullness/mass on bimanual exam.     Skin:     General: Skin is warm and dry.     Neurological:      Mental Status: She is alert.          Labs: I have reviewed pertinent labs.   Results for orders placed or performed in visit on 04/29/25   PTH, intact   Result Value Ref Range    PTH 69.3 12.0 - 88.0 pg/mL   Result Value Ref Range    Calcium, Ionized 1.26 1.12 - 1.32 mmol/L   Result Value Ref Range    Magnesium 2.0 1.9 - 2.7 mg/dL      Lab Results   Component Value Date/Time     5.4 04/29/2025 10:14 AM     Lab Results   Component Value Date/Time    Potassium 4.5 04/18/2025 09:15 AM    Chloride 104 04/18/2025 09:15 AM    CO2 30 04/18/2025 09:15 AM    BUN 13 04/18/2025 09:15 AM    Creatinine 0.84 04/18/2025 09:15 AM    Glucose, Fasting 90 04/18/2025 09:15 AM    Calcium 10.6 (H) 04/18/2025 09:15 AM    Corrected Calcium 9.9 02/09/2025 05:48 AM    AST 17 04/18/2025 09:15 AM    ALT 14 04/18/2025 09:15 AM    Alkaline Phosphatase 69 04/18/2025 09:15 AM    eGFR 78 04/18/2025 09:15 AM     Lab Results   Component Value Date/Time    WBC 6.26 04/18/2025 09:15 AM    Hemoglobin 14.0 04/18/2025 09:15 AM    Hematocrit 42.2 04/18/2025 09:15 AM    MCV 90 04/18/2025 09:15 AM    Platelets 347 04/18/2025 09:15 AM     Lab Results   Component Value Date/Time    Absolute Neutrophils 2.70 04/18/2025 09:15 AM        Trend:  Lab Results   Component Value Date     5.4 04/29/2025     182.5 (H) 01/22/2025        Radiology Results Review : No pertinent imaging studies reviewed.  Other Study Results Review : No additional pertinent studies reviewed.    Administrative Statements   I have spent a total time of 20 minutes in caring for this patient on the day of the visit/encounter including Diagnostic results, Prognosis, Risks and benefits of tx options, Instructions for management, and Counseling / Coordination of care.    Patient seen and discussed with Dr. Valderrama.    Gina Resendiz  Gynecologic Oncology Fellow

## 2025-05-28 ENCOUNTER — TELEPHONE (OUTPATIENT)
Age: 55
End: 2025-05-28

## 2025-05-28 ENCOUNTER — HOSPITAL ENCOUNTER (OUTPATIENT)
Dept: MRI IMAGING | Facility: HOSPITAL | Age: 55
Discharge: HOME | End: 2025-05-28
Attending: PHYSICIAN ASSISTANT

## 2025-05-28 DIAGNOSIS — K76.9 LESION OF LIVER: ICD-10-CM

## 2025-05-28 DIAGNOSIS — F40.240 CLAUSTROPHOBIA: Primary | ICD-10-CM

## 2025-05-28 DIAGNOSIS — R93.2 ABNORMAL FINDING ON IMAGING OF LIVER: ICD-10-CM

## 2025-05-28 RX ORDER — LORAZEPAM 1 MG/1
1 TABLET ORAL
Qty: 2 TABLET | Refills: 0 | Status: SHIPPED | OUTPATIENT
Start: 2025-05-28

## 2025-05-28 NOTE — TELEPHONE ENCOUNTER
I called patient to discuss, she was not aware that she had claustrophobia but started panicking when she went into small machine, going to work now and gets up at 3:30, woke up this morning with a itchy face and swelling on both sides, with scratching, took allergy medication and seems to have improved, recommend in person evaluation if ongoing face swelling and ER if acute worsening, will start lorazepam 1 mg 1 hour prior to procedure, will have someone drive her, schedule [facility] for open MRI instead.  She expressed understanding/thanks.

## 2025-05-28 NOTE — TELEPHONE ENCOUNTER
Patient reports she was unable to complete her MRI today because the machine was too small and she had a panic attack. She would like to go to a facility with a larger MRI machine and may need medication ordered to help her relax prior. Patient would like a call back from her PCP to discuss.

## 2025-05-30 NOTE — PROGRESS NOTES
I reached out and spoke with Esther , now that consults have been completed with the oncology teams. I introduced myself and explained my role as their Patient Navigator. I reviewed for any barriers to care and offered referrals to supportive services as needed. I reviewed and updated the members assigned to the care team in [facility]. She knows the members of the care team as well as how and when to contact them with any needs.     Distress Thermometer completed at this time. Patient scored 0/10. Based on responses to DT, no indication for referral to SW needed at this time. .     She is currently able to drive and denies any transportation needs.      She denies any uncontrolled symptoms. Discussed role of Palliative Care in symptom and side effect management. Declined referral at this time.    She states that she is eating and drinking as per usual with no unintentional weight loss.       Patient does not smoke.     She states she is well supported by family and friends.  Community support groups discussed including the [facility] Support [facility] of the [facility] [address]. Patient declined information at this time.     She feels she has adequate insurance coverage and denies any financial concerns at this time.     She verbalizes managing the schedules well.   Future Appointments   Date Time Provider Department Center   5/27/2025 10:30 AM Eusebio Valderrama MD [facility]        Based on individual needs I will follow up as needed  I have provided my direct contact information and welcome them to contact me if needs as discussed above change. She was appreciative for the call.    2

## 2025-06-04 NOTE — TELEPHONE ENCOUNTER
[facility] called to have authorization completed on patients MRI of the Abdomen. Please complete    [facility]  NPI: [id number]  [id number]

## 2025-06-09 ENCOUNTER — HOSPITAL ENCOUNTER (OUTPATIENT)
Dept: MAMMOGRAPHY | Facility: MEDICAL CENTER | Age: 55
Discharge: HOME | End: 2025-06-09
Payer: COMMERCIAL

## 2025-06-09 VITALS — BODY MASS INDEX: 35.34 KG/M2 | HEIGHT: 62 IN | WEIGHT: 192.02 LBS

## 2025-06-09 DIAGNOSIS — Z12.31 SCREENING MAMMOGRAM FOR BREAST CANCER: ICD-10-CM

## 2025-06-09 PROCEDURE — 77063 BREAST TOMOSYNTHESIS BI: CPT

## 2025-06-09 PROCEDURE — 77067 SCR MAMMO BI INCL CAD: CPT

## 2025-06-18 DIAGNOSIS — M54.50 CHRONIC BILATERAL LOW BACK PAIN WITHOUT SCIATICA: ICD-10-CM

## 2025-06-18 DIAGNOSIS — M62.838 TRAPEZIUS MUSCLE SPASM: ICD-10-CM

## 2025-06-18 DIAGNOSIS — R25.2 LEG CRAMPING: ICD-10-CM

## 2025-06-18 DIAGNOSIS — G89.29 CHRONIC BILATERAL LOW BACK PAIN WITHOUT SCIATICA: ICD-10-CM

## 2025-06-18 RX ORDER — METHOCARBAMOL 500 MG/1
500 TABLET, FILM COATED ORAL 3 TIMES DAILY PRN
Qty: 30 TABLET | Refills: 0 | Status: SHIPPED | OUTPATIENT
Start: 2025-06-18

## 2025-07-08 ENCOUNTER — TELEPHONE (OUTPATIENT)
Age: 55
End: 2025-07-08

## 2025-07-08 NOTE — TELEPHONE ENCOUNTER
Pt was called and stated that she has an appt on July 17 and she will wait until that appointment to check her low back pain.

## 2025-07-08 NOTE — TELEPHONE ENCOUNTER
Patient would like the order for her MRI sent to the [facility], [address] [place] [address] MAHESH Huerta.  Phone # [phone]. Stated that they told her they will schedule an appt for her but they need the order.

## 2025-07-08 NOTE — TELEPHONE ENCOUNTER
Patient called and is asking for pcp to please write a letter excusing her from work. Patient stated she is having a flare up of her back pain. Patient declined having a visit with pcp at this time however patient stated if pcp needs to see her she would schedule a visit. Please review with patients pcp to see if patient needs a visit at this time. Patient stated her pcp has written letter previously excusing her from work for her back pain. Please advise.

## 2025-07-08 NOTE — TELEPHONE ENCOUNTER
I called patient, left voicemail regarding letter excusing from work, needs appointment for back pain

## 2025-07-14 ENCOUNTER — OFFICE VISIT (OUTPATIENT)
Dept: FAMILY MEDICINE CLINIC | Facility: CLINIC | Age: 55
End: 2025-07-14
Payer: COMMERCIAL

## 2025-07-14 VITALS
OXYGEN SATURATION: 97 % | BODY MASS INDEX: 36.47 KG/M2 | SYSTOLIC BLOOD PRESSURE: 128 MMHG | RESPIRATION RATE: 16 BRPM | HEART RATE: 83 BPM | DIASTOLIC BLOOD PRESSURE: 78 MMHG | TEMPERATURE: 96.2 F | WEIGHT: 199.4 LBS

## 2025-07-14 DIAGNOSIS — R93.2 ABNORMAL FINDING ON IMAGING OF LIVER: ICD-10-CM

## 2025-07-14 DIAGNOSIS — I10 ESSENTIAL HYPERTENSION: Primary | ICD-10-CM

## 2025-07-14 DIAGNOSIS — C56.1 MALIGNANT NEOPLASM OF RIGHT OVARY (HCC): ICD-10-CM

## 2025-07-14 DIAGNOSIS — M54.50 CHRONIC BILATERAL LOW BACK PAIN WITHOUT SCIATICA: ICD-10-CM

## 2025-07-14 DIAGNOSIS — G89.29 CHRONIC BILATERAL LOW BACK PAIN WITHOUT SCIATICA: ICD-10-CM

## 2025-07-14 PROCEDURE — 99214 OFFICE O/P EST MOD 30 MIN: CPT | Performed by: PHYSICIAN ASSISTANT

## 2025-07-14 RX ORDER — ACETAMINOPHEN 160 MG
2000 TABLET,DISINTEGRATING ORAL DAILY
COMMUNITY

## 2025-07-14 NOTE — ASSESSMENT & PLAN NOTE
Has appointment in August with gynecology oncology and will repeat  prior to appointment.  History of stage Ia squamous cell carcinoma of ovary.         Component  Ref Range & Units (hover) 4/29/25 10:14 AM 1/22/25  1:50 PM    5.4 182.5 High  CM

## 2025-07-14 NOTE — ASSESSMENT & PLAN NOTE
Patient was scheduled for MRI liver in May but insurance issues as she switched to open MRI instead due to concerns for claustrophobia.  7 mm nonspecific finding on CT done 1/2025 and nonspecific findings on ultrasound liver in 4/2025 with history of ovarian cancer.  Patient to reschedule MRI liver and will take lorazepam prior to MRI.

## 2025-07-14 NOTE — ASSESSMENT & PLAN NOTE
BP Readings from Last 3 Encounters:   07/14/25 128/78   05/27/25 120/78   04/29/25 144/82     Wt Readings from Last 3 Encounters:   07/14/25 90.4 kg (199 lb 6.4 oz)   06/09/25 87.1 kg (192 lb 0.3 oz)   05/27/25 87.1 kg (192 lb)     Recent weight gain but has been exercising more, walk 25,000 steps yesterday.  Blood pressure was improved on lisinopril 40 mg and amlodipine 5 mg.  Encouraged to continue same medication, low-salt diet, DASH diet and exercise.

## 2025-07-14 NOTE — ASSESSMENT & PLAN NOTE
Intermittent episodes, improved with Robaxin, missed work but has FMLA for intermittent leave as needed as pain has improved and typically exacerbations lasts less than a day.  Encouraged core strengthening exercises and back stretching as needed.

## 2025-07-14 NOTE — PROGRESS NOTES
Name: Esther Gonzalez      : 1970      MRN: [id number]  Encounter Provider: Madelin Torres PA-C  Encounter Date: 2025   Encounter department: [facility] CARE [facility]  :  Assessment & Plan  Essential hypertension  BP Readings from Last 3 Encounters:   25 128/78   25 120/78   25 144/82     Wt Readings from Last 3 Encounters:   25 90.4 kg (199 lb 6.4 oz)   25 87.1 kg (192 lb 0.3 oz)   25 87.1 kg (192 lb)     Recent weight gain but has been exercising more, walk 25,000 steps yesterday.  Blood pressure was improved on lisinopril 40 mg and amlodipine 5 mg.  Encouraged to continue same medication, low-salt diet, DASH diet and exercise.       Chronic bilateral low back pain without sciatica  Intermittent episodes, improved with Robaxin, missed work but has FMLA for intermittent leave as needed as pain has improved and typically exacerbations lasts less than a day.  Encouraged core strengthening exercises and back stretching as needed.       Abnormal finding on imaging of liver  Patient was scheduled for MRI liver in May but insurance issues as she switched to open MRI instead due to concerns for claustrophobia.  7 mm nonspecific finding on CT done 2025 and nonspecific findings on ultrasound liver in 2025 with history of ovarian cancer.  Patient to reschedule MRI liver and will take lorazepam prior to MRI.       Malignant neoplasm of right ovary (HCC)  Has appointment in August with gynecology oncology and will repeat  prior to appointment.  History of stage Ia squamous cell carcinoma of ovary.         Component  Ref Range & Units (hover) 25 10:14 AM 25  1:50 PM    5.4 182.5 High  CM                    History of Present Illness   Esther is a 54 y.o. female with a h/o ovarian CA s/p recent laparotomy, HTN who presents for for routine follow-up, missed work last week due to back pain but has FMLA and was approved.  Did not  reschedule MRI because they did not call her back and there was insurance issues.  Otherwise feeling well, no longer has back pain that she missed work for last week.  Takes Robaxin as needed which helps.  Has been exercising, walking for exercise daily.      Review of Systems   Constitutional:  Negative for fever and unexpected weight change.   Respiratory:  Negative for shortness of breath.    Cardiovascular:  Negative for chest pain.   Musculoskeletal:  Positive for back pain (Intermittent, currently improved).       Objective   /78 (BP Location: Left arm, Patient Position: Sitting, Cuff Size: Standard)   Pulse 83   Temp (!) 96.2 °F (35.7 °C) (Tympanic)   Resp 16   Wt 90.4 kg (199 lb 6.4 oz)   LMP 04/11/2021   SpO2 97%   BMI 36.47 kg/m²      Physical Exam  Vitals reviewed.   Constitutional:       Appearance: Normal appearance.   HENT:      Head: Normocephalic and atraumatic.     Cardiovascular:      Rate and Rhythm: Normal rate and regular rhythm.   Pulmonary:      Effort: Pulmonary effort is normal.      Breath sounds: Normal breath sounds.     Musculoskeletal:      Right lower leg: No edema.      Left lower leg: No edema.      Comments: 5 out of 5 strength in lower extremity bilaterally     Neurological:      Mental Status: She is alert and oriented to person, place, and time. Mental status is at baseline.

## 2025-08-04 ENCOUNTER — TELEPHONE (OUTPATIENT)
Dept: GYNECOLOGIC ONCOLOGY | Facility: CLINIC | Age: 55
End: 2025-08-04

## 2025-08-08 ENCOUNTER — RESULTS FOLLOW-UP (OUTPATIENT)
Dept: FAMILY MEDICINE CLINIC | Facility: CLINIC | Age: 55
End: 2025-08-08

## (undated) DEVICE — SUT VICRYL 0 UR-6 27 IN J603H

## (undated) DEVICE — LAPAROSCOPIC SMOKE EVAC TUBING

## (undated) DEVICE — SOLUTION BOWL: Brand: KENDALL

## (undated) DEVICE — ENDOPATH 5MM CURVED SCISSORS WITH MONOPOLAR CAUTERY: Brand: ENDOPATH

## (undated) DEVICE — BETHLEHEM UNIVERSAL GYN LAP PK: Brand: CARDINAL HEALTH

## (undated) DEVICE — TROCAR: Brand: KII FIOS FIRST ENTRY

## (undated) DEVICE — TISSUE RETRIEVAL SYSTEM: Brand: INZII RETRIEVAL SYSTEM

## (undated) DEVICE — INTENDED FOR TISSUE SEPARATION, AND OTHER PROCEDURES THAT REQUIRE A SHARP SURGICAL BLADE TO PUNCTURE OR CUT.: Brand: BARD-PARKER SAFETY BLADES SIZE 11, STERILE

## (undated) DEVICE — DRAPE TOWEL: Brand: CONVERTORS

## (undated) DEVICE — GLOVE INDICATOR PI UNDERGLOVE SZ 9 BLUE

## (undated) DEVICE — CAUTERY TIP POLISHER: Brand: DEVON

## (undated) DEVICE — 3M™ STERI-DRAPE™ UNDER BUTTOCKS DRAPE WITH POUCH 1084: Brand: STERI-DRAPE™

## (undated) DEVICE — BLUE HEAT SCOPE WARMER

## (undated) DEVICE — DRAPE FLUID WARMER (BIRD BATH)

## (undated) DEVICE — TELFA ADHESIVE ISLAND DRESSING: Brand: TELFA

## (undated) DEVICE — BETHLEHEM UNIVERSAL LAPAROTOMY: Brand: CARDINAL HEALTH

## (undated) DEVICE — SUT VICRYL 0 CT-1 36 IN J946H

## (undated) DEVICE — KIT INCLUDES:GTB14, ALEXIS CONTAINED EXT SYS 5BXCNGL3, GELPOINT MINI ADV ACCESS PLATFORM: Brand: ALEXIS CONTAINED EXTRACTION SYSTEM WITH GELPOINT MINI ADVANCED ACCESS PLATFORM

## (undated) DEVICE — GLOVE INDICATOR PI UNDERGLOVE SZ 8.5 BLUE

## (undated) DEVICE — INTENDED FOR TISSUE SEPARATION, AND OTHER PROCEDURES THAT REQUIRE A SHARP SURGICAL BLADE TO PUNCTURE OR CUT.: Brand: BARD-PARKER SAFETY BLADES SIZE 10, STERILE

## (undated) DEVICE — IRRIG ENDO FLO TUBING

## (undated) DEVICE — LIGHT GLOVE GREEN

## (undated) DEVICE — SUT MONOCRYL 4-0 PS-2 27 IN Y426H

## (undated) DEVICE — SUT STRATAFIX SPIRAL PLUS 3-0 PS-2 30 X 30 CM SXMP2B408

## (undated) DEVICE — SUT MONOCRYL 4-0 SH 27 IN Y415H

## (undated) DEVICE — 3000CC GUARDIAN II: Brand: GUARDIAN

## (undated) DEVICE — TRAY FOLEY 16FR URIMETER SILICONE SURESTEP

## (undated) DEVICE — SUT VICRYL 0 CT-1 CR/8 27 IN JJ41G

## (undated) DEVICE — SUT PLAIN 3-0 PS-1 27 IN 1640H

## (undated) DEVICE — PMI DISPOSABLE PUNCTURE CLOSURE DEVICE / SUTURE GRASPER: Brand: PMI

## (undated) DEVICE — PENCIL ELECTROSURG E-Z CLEAN -0035H

## (undated) DEVICE — SYRINGE 50ML LL

## (undated) DEVICE — CHLORAPREP HI-LITE 26ML ORANGE

## (undated) DEVICE — EXOFIN PRECISION PEN HIGH VISCOSITY TOPICAL SKIN ADHESIVE: Brand: EXOFIN PRECISION PEN, 1G

## (undated) DEVICE — INSUFFLATION NEEDLE TO ESTABLISH PNEUMOPERITONEUM.: Brand: INSUFFLATION NEEDLE

## (undated) DEVICE — SPECIMEN CONTAINER STERILE PEEL PACK

## (undated) DEVICE — DRAPE EQUIPMENT RF WAND

## (undated) DEVICE — 3M™ TEGADERM™ TRANSPARENT FILM DRESSING FRAME STYLE, 1626W, 4 IN X 4-3/4 IN (10 CM X 12 CM), 50/CT 4CT/CASE: Brand: 3M™ TEGADERM™

## (undated) DEVICE — INTENDED FOR TISSUE SEPARATION, AND OTHER PROCEDURES THAT REQUIRE A SHARP SURGICAL BLADE TO PUNCTURE OR CUT.: Brand: BARD-PARKER SAFETY BLADES SIZE 15, STERILE

## (undated) DEVICE — ACCESS PLATFORM FOR MINIMALLY INVASIVE SURGERY.: Brand: GELPORT® LAPAROSCOPIC  SYSTEM

## (undated) DEVICE — LAPAROTOMY SPONGE - RF AND X-RAY DETECTABLE PRE-WASHED: Brand: SITUATE

## (undated) DEVICE — TROCAR: Brand: KII SLEEVE

## (undated) DEVICE — EXIDINE 4 PCT

## (undated) DEVICE — PREMIUM DRY TRAY LF: Brand: MEDLINE INDUSTRIES, INC.

## (undated) DEVICE — SUT VICRYL 0 REEL 54 IN J287G

## (undated) DEVICE — MAYO STAND COVER: Brand: CONVERTORS

## (undated) DEVICE — ELECTRODE LAP J HOOK E-Z CLEAN 33CM-0021

## (undated) DEVICE — SUT PDS II 1 TP-1 96 IN Z880G

## (undated) DEVICE — GLOVE PI ULTRA TOUCH SZ.7.5

## (undated) DEVICE — SUT PLAIN 3-0 CTX 27 IN 873H

## (undated) DEVICE — MEDI-VAC YANKAUER SUCTION HANDLE W/BULBOUS AND CONTROL VENT: Brand: CARDINAL HEALTH

## (undated) DEVICE — ENSEAL X1 TISSUE SEALER, CURVED JAW, 37 CM SHAFT LENGTH: Brand: ENSEAL

## (undated) DEVICE — 40595 XL TRENDELENBURG POSITIONING KIT: Brand: 40595 XL TRENDELENBURG POSITIONING KIT

## (undated) DEVICE — 3M™ STERI-STRIP™ REINFORCED ADHESIVE SKIN CLOSURES, R1547, 1/2 IN X 4 IN (12 MM X 100 MM), 6 STRIPS/ENVELOPE: Brand: 3M™ STERI-STRIP™

## (undated) DEVICE — GLOVE PI ULTRA TOUCH SZ.8.5

## (undated) DEVICE — REM POLYHESIVE ADULT PATIENT RETURN ELECTRODE: Brand: VALLEYLAB

## (undated) DEVICE — INSULATED BLADE ELECTRODE;CAUTION: FOR MANUFACTURING, PROCESSING, OR REPACKING.: Brand: EDGE

## (undated) DEVICE — UNDYED BRAIDED (POLYGLACTIN 910), SYNTHETIC ABSORBABLE SUTURE: Brand: COATED VICRYL

## (undated) DEVICE — 3M™ TEGADERM™ TRANSPARENT FILM DRESSING FRAME STYLE, 1627, 4 IN X 10 IN (10 CM X 25 CM), 20/CT 4CT/CASE: Brand: 3M™ TEGADERM™

## (undated) DEVICE — LAPAROTOMY DRAPE WITH POUCHES: Brand: CONVERTORS

## (undated) DEVICE — UTERINE MANIPULATOR RUMI 6.7 X 10 CM

## (undated) DEVICE — VICRYL VLT 0 45CM ENDOLOOP: Brand: ENDOLOOP

## (undated) DEVICE — PLASTIC ADHESIVE BANDAGE: Brand: CURITY

## (undated) DEVICE — SCD SEQUENTIAL COMPRESSION COMFORT SLEEVE MEDIUM KNEE LENGTH: Brand: KENDALL SCD

## (undated) DEVICE — SUT SILK 0 CT-1 30 IN 424H

## (undated) DEVICE — DRESSING MEPORE FILM ADHESIVE 4 X 5IN

## (undated) DEVICE — [HIGH FLOW INSUFFLATOR,  DO NOT USE IF PACKAGE IS DAMAGED,  KEEP DRY,  KEEP AWAY FROM SUNLIGHT,  PROTECT FROM HEAT AND RADIOACTIVE SOURCES.]: Brand: PNEUMOSURE

## (undated) DEVICE — BLUNT TIP LAPAROSCOPIC SEALER/DIVIDER NANO-COATED: Brand: LIGASURE

## (undated) DEVICE — TOWEL SURG XR DETECT GREEN STRL RFD

## (undated) DEVICE — PDS II VLT 0 107CM AG ST3: Brand: ENDOLOOP